# Patient Record
Sex: MALE | Race: WHITE | NOT HISPANIC OR LATINO | Employment: FULL TIME | URBAN - METROPOLITAN AREA
[De-identification: names, ages, dates, MRNs, and addresses within clinical notes are randomized per-mention and may not be internally consistent; named-entity substitution may affect disease eponyms.]

---

## 2017-02-08 DIAGNOSIS — R10.9 ABDOMINAL PAIN: ICD-10-CM

## 2017-02-08 DIAGNOSIS — E78.5 HYPERLIPIDEMIA: ICD-10-CM

## 2017-04-10 DIAGNOSIS — E78.5 HYPERLIPIDEMIA: ICD-10-CM

## 2017-04-10 DIAGNOSIS — R63.5 ABNORMAL WEIGHT GAIN: ICD-10-CM

## 2017-04-10 DIAGNOSIS — R00.0 TACHYCARDIA: ICD-10-CM

## 2017-04-10 DIAGNOSIS — F41.9 ANXIETY DISORDER: ICD-10-CM

## 2017-04-12 ENCOUNTER — TRANSCRIBE ORDERS (OUTPATIENT)
Dept: ADMINISTRATIVE | Facility: HOSPITAL | Age: 47
End: 2017-04-12

## 2017-04-12 ENCOUNTER — ALLSCRIPTS OFFICE VISIT (OUTPATIENT)
Dept: OTHER | Facility: OTHER | Age: 47
End: 2017-04-12

## 2017-04-12 DIAGNOSIS — F41.9 ANXIETY HYPERVENTILATION: Primary | ICD-10-CM

## 2017-04-12 DIAGNOSIS — F45.8 ANXIETY HYPERVENTILATION: Primary | ICD-10-CM

## 2017-04-12 DIAGNOSIS — E78.5 OTHER AND UNSPECIFIED HYPERLIPIDEMIA: ICD-10-CM

## 2017-04-12 DIAGNOSIS — F45.8 ANXIETY HYPERVENTILATION: ICD-10-CM

## 2017-04-12 DIAGNOSIS — F41.9 ANXIETY HYPERVENTILATION: ICD-10-CM

## 2017-04-12 DIAGNOSIS — R00.0 TACHYCARDIA, UNSPECIFIED: ICD-10-CM

## 2017-04-17 ENCOUNTER — GENERIC CONVERSION - ENCOUNTER (OUTPATIENT)
Dept: OTHER | Facility: OTHER | Age: 47
End: 2017-04-17

## 2017-04-17 ENCOUNTER — APPOINTMENT (OUTPATIENT)
Dept: LAB | Facility: MEDICAL CENTER | Age: 47
End: 2017-04-17
Payer: COMMERCIAL

## 2017-04-17 DIAGNOSIS — F45.8 ANXIETY HYPERVENTILATION: ICD-10-CM

## 2017-04-17 DIAGNOSIS — E78.5 HYPERLIPIDEMIA: ICD-10-CM

## 2017-04-17 DIAGNOSIS — F41.9 ANXIETY HYPERVENTILATION: ICD-10-CM

## 2017-04-17 DIAGNOSIS — E78.5 OTHER AND UNSPECIFIED HYPERLIPIDEMIA: ICD-10-CM

## 2017-04-17 DIAGNOSIS — R00.0 TACHYCARDIA, UNSPECIFIED: ICD-10-CM

## 2017-04-17 LAB
ALBUMIN SERPL BCP-MCNC: 3.7 G/DL (ref 3.5–5)
ALP SERPL-CCNC: 44 U/L (ref 46–116)
ALT SERPL W P-5'-P-CCNC: 25 U/L (ref 12–78)
ANION GAP SERPL CALCULATED.3IONS-SCNC: 6 MMOL/L (ref 4–13)
AST SERPL W P-5'-P-CCNC: 17 U/L (ref 5–45)
BASOPHILS # BLD AUTO: 0.02 THOUSANDS/ΜL (ref 0–0.1)
BASOPHILS NFR BLD AUTO: 0 % (ref 0–1)
BILIRUB SERPL-MCNC: 0.41 MG/DL (ref 0.2–1)
BUN SERPL-MCNC: 17 MG/DL (ref 5–25)
CALCIUM SERPL-MCNC: 8.4 MG/DL (ref 8.3–10.1)
CHLORIDE SERPL-SCNC: 107 MMOL/L (ref 100–108)
CHOLEST SERPL-MCNC: 233 MG/DL (ref 50–200)
CO2 SERPL-SCNC: 27 MMOL/L (ref 21–32)
CREAT SERPL-MCNC: 0.89 MG/DL (ref 0.6–1.3)
EOSINOPHIL # BLD AUTO: 0.24 THOUSAND/ΜL (ref 0–0.61)
EOSINOPHIL NFR BLD AUTO: 4 % (ref 0–6)
ERYTHROCYTE [DISTWIDTH] IN BLOOD BY AUTOMATED COUNT: 12.8 % (ref 11.6–15.1)
GFR SERPL CREATININE-BSD FRML MDRD: >60 ML/MIN/1.73SQ M
GLUCOSE P FAST SERPL-MCNC: 96 MG/DL (ref 65–99)
HCT VFR BLD AUTO: 43.3 % (ref 36.5–49.3)
HDLC SERPL-MCNC: 60 MG/DL (ref 40–60)
HGB BLD-MCNC: 14.6 G/DL (ref 12–17)
LDLC SERPL CALC-MCNC: 137 MG/DL (ref 0–100)
LYMPHOCYTES # BLD AUTO: 1.75 THOUSANDS/ΜL (ref 0.6–4.47)
LYMPHOCYTES NFR BLD AUTO: 31 % (ref 14–44)
MCH RBC QN AUTO: 30.7 PG (ref 26.8–34.3)
MCHC RBC AUTO-ENTMCNC: 33.7 G/DL (ref 31.4–37.4)
MCV RBC AUTO: 91 FL (ref 82–98)
MONOCYTES # BLD AUTO: 0.71 THOUSAND/ΜL (ref 0.17–1.22)
MONOCYTES NFR BLD AUTO: 12 % (ref 4–12)
NEUTROPHILS # BLD AUTO: 2.99 THOUSANDS/ΜL (ref 1.85–7.62)
NEUTS SEG NFR BLD AUTO: 53 % (ref 43–75)
NRBC BLD AUTO-RTO: 0 /100 WBCS
PLATELET # BLD AUTO: 242 THOUSANDS/UL (ref 149–390)
PMV BLD AUTO: 11.2 FL (ref 8.9–12.7)
POTASSIUM SERPL-SCNC: 4 MMOL/L (ref 3.5–5.3)
PROT SERPL-MCNC: 7.1 G/DL (ref 6.4–8.2)
RBC # BLD AUTO: 4.75 MILLION/UL (ref 3.88–5.62)
SODIUM SERPL-SCNC: 140 MMOL/L (ref 136–145)
TRIGL SERPL-MCNC: 181 MG/DL
TSH SERPL DL<=0.05 MIU/L-ACNC: 1.29 UIU/ML (ref 0.36–3.74)
WBC # BLD AUTO: 5.73 THOUSAND/UL (ref 4.31–10.16)

## 2017-04-17 PROCEDURE — 80061 LIPID PANEL: CPT

## 2017-04-17 PROCEDURE — 84443 ASSAY THYROID STIM HORMONE: CPT

## 2017-04-17 PROCEDURE — 36415 COLL VENOUS BLD VENIPUNCTURE: CPT

## 2017-04-17 PROCEDURE — 80053 COMPREHEN METABOLIC PANEL: CPT

## 2017-04-17 PROCEDURE — 85025 COMPLETE CBC W/AUTO DIFF WBC: CPT

## 2017-04-18 ENCOUNTER — GENERIC CONVERSION - ENCOUNTER (OUTPATIENT)
Dept: OTHER | Facility: OTHER | Age: 47
End: 2017-04-18

## 2017-05-09 ENCOUNTER — HOSPITAL ENCOUNTER (OUTPATIENT)
Dept: NUCLEAR MEDICINE | Facility: HOSPITAL | Age: 47
Discharge: HOME | End: 2017-05-09
Payer: COMMERCIAL

## 2017-05-09 ENCOUNTER — HOSPITAL ENCOUNTER (OUTPATIENT)
Dept: NON INVASIVE DIAGNOSTICS | Facility: HOSPITAL | Age: 47
Discharge: HOME | End: 2017-05-09
Payer: COMMERCIAL

## 2017-05-09 DIAGNOSIS — E78.5 HYPERLIPIDEMIA: ICD-10-CM

## 2017-05-09 DIAGNOSIS — F41.9 ANXIETY DISORDER: ICD-10-CM

## 2017-05-09 DIAGNOSIS — R00.0 TACHYCARDIA: ICD-10-CM

## 2017-05-09 DIAGNOSIS — E78.5 OTHER AND UNSPECIFIED HYPERLIPIDEMIA: ICD-10-CM

## 2017-05-09 DIAGNOSIS — R00.0 TACHYCARDIA, UNSPECIFIED: ICD-10-CM

## 2017-05-09 DIAGNOSIS — F45.8 ANXIETY HYPERVENTILATION: ICD-10-CM

## 2017-05-09 DIAGNOSIS — F41.9 ANXIETY HYPERVENTILATION: ICD-10-CM

## 2017-05-09 PROCEDURE — 93225 XTRNL ECG REC<48 HRS REC: CPT

## 2017-05-09 PROCEDURE — 93017 CV STRESS TEST TRACING ONLY: CPT

## 2017-05-09 PROCEDURE — 93306 TTE W/DOPPLER COMPLETE: CPT

## 2017-05-09 PROCEDURE — A9502 TC99M TETROFOSMIN: HCPCS

## 2017-05-09 PROCEDURE — 78452 HT MUSCLE IMAGE SPECT MULT: CPT

## 2017-05-09 PROCEDURE — 93226 XTRNL ECG REC<48 HR SCAN A/R: CPT

## 2017-05-11 ENCOUNTER — GENERIC CONVERSION - ENCOUNTER (OUTPATIENT)
Dept: OTHER | Facility: OTHER | Age: 47
End: 2017-05-11

## 2017-06-06 ENCOUNTER — ALLSCRIPTS OFFICE VISIT (OUTPATIENT)
Dept: OTHER | Facility: OTHER | Age: 47
End: 2017-06-06

## 2017-06-06 DIAGNOSIS — I49.1 ATRIAL PREMATURE DEPOLARIZATION: ICD-10-CM

## 2017-10-15 DIAGNOSIS — E78.5 HYPERLIPIDEMIA: ICD-10-CM

## 2017-10-31 ENCOUNTER — APPOINTMENT (OUTPATIENT)
Dept: LAB | Facility: MEDICAL CENTER | Age: 47
End: 2017-10-31
Payer: COMMERCIAL

## 2017-10-31 ENCOUNTER — TRANSCRIBE ORDERS (OUTPATIENT)
Dept: ADMINISTRATIVE | Facility: HOSPITAL | Age: 47
End: 2017-10-31

## 2017-10-31 DIAGNOSIS — E78.5 HYPERLIPIDEMIA: ICD-10-CM

## 2017-10-31 LAB
ALT SERPL W P-5'-P-CCNC: 28 U/L (ref 12–78)
AST SERPL W P-5'-P-CCNC: 24 U/L (ref 5–45)
CHOLEST SERPL-MCNC: 213 MG/DL (ref 50–200)
HDLC SERPL-MCNC: 61 MG/DL (ref 40–60)
LDLC SERPL CALC-MCNC: 121 MG/DL (ref 0–100)
TRIGL SERPL-MCNC: 155 MG/DL

## 2017-10-31 PROCEDURE — 84450 TRANSFERASE (AST) (SGOT): CPT

## 2017-10-31 PROCEDURE — 36415 COLL VENOUS BLD VENIPUNCTURE: CPT

## 2017-10-31 PROCEDURE — 84460 ALANINE AMINO (ALT) (SGPT): CPT

## 2017-10-31 PROCEDURE — 80061 LIPID PANEL: CPT

## 2018-01-10 NOTE — RESULT NOTES
Verified Results  (1) CBC/PLT/DIFF [id number] 06:50AM Joseph Villalobos     Test Name Result Flag Reference   WBC COUNT 5 73 Thousand/uL  4 31-10 16   RBC COUNT 4 75 Million/uL  3 88-5 62   HEMOGLOBIN 14 6 g/dL  12 0-17 0   HEMATOCRIT 43 3 %  36 5-49 3   MCV 91 fL  82-98   MCH 30 7 pg  26 8-34 3   MCHC 33 7 g/dL  31 4-37 4   RDW 12 8 %  11 6-15 1   MPV 11 2 fL  8 9-12 7   PLATELET COUNT 676 Thousands/uL  149-390   nRBC AUTOMATED 0 /100 WBCs     NEUTROPHILS RELATIVE PERCENT 53 %  43-75   LYMPHOCYTES RELATIVE PERCENT 31 %  14-44   MONOCYTES RELATIVE PERCENT 12 %  4-12   EOSINOPHILS RELATIVE PERCENT 4 %  0-6   BASOPHILS RELATIVE PERCENT 0 %  0-1   NEUTROPHILS ABSOLUTE COUNT 2 99 Thousands/? ??L  1 85-7 62   LYMPHOCYTES ABSOLUTE COUNT 1 75 Thousands/? ??L  0 60-4 47   MONOCYTES ABSOLUTE COUNT 0 71 Thousand/? ??L  0 17-1 22   EOSINOPHILS ABSOLUTE COUNT 0 24 Thousand/? ??L  0 00-0 61   BASOPHILS ABSOLUTE COUNT 0 02 Thousands/? ??L  0 00-0 10   This bloodwork is non-fasting  Please drink two glasses of water morning of  bloodwork  This bloodwork is non-fasting  Please drink two glasses of water morning ofbloodwork  (1) COMPREHENSIVE METABOLIC PANEL [id number] 83:34MO Joseph Villalobos     Test Name Result Flag Reference   SODIUM 140 mmol/L  136-145   POTASSIUM 4 0 mmol/L  3 5-5 3   CHLORIDE 107 mmol/L  100-108   CARBON DIOXIDE 27 mmol/L  21-32   ANION GAP (CALC) 6 mmol/L  4-13   BLOOD UREA NITROGEN 17 mg/dL  5-25   CREATININE 0 89 mg/dL  0 60-1 30   Standardized to IDMS reference method   CALCIUM 8 4 mg/dL  8 3-10 1   BILI, TOTAL 0 41 mg/dL  0 20-1 00   ALK PHOSPHATAS 44 U/L L    ALT (SGPT) 25 U/L  12-78   AST(SGOT) 17 U/L  5-45   ALBUMIN 3 7 g/dL  3 5-5 0   TOTAL PROTEIN 7 1 g/dL  6 4-8 2   eGFR Non-African American      >60 0 ml/min/1 73sq m   - Patient Instructions:  This is a fasting blood test  Water, black tea or black coffee only after 9:00pm the night before test Drink 2 glasses of water the morning of test This bloodwork is non-fasting  Please drink two glasses of water morning   ofbloodwork  National Kidney Disease Education Program recommendations are as follows:  GFR calculation is accurate only with a steady state creatinine  Chronic Kidney disease less than 60 ml/min/1 73 sq  meters  Kidney failure less than 15 ml/min/1 73 sq  meters  GLUCOSE FASTING 96 mg/dL  65-99     (1) TSH [id number] 06:50AM Liu Scales     Test Name Result Flag Reference   TSH 1 290 uIU/mL  0 358-3 740   This bloodwork is non-fasting  Please drink two glasses of water morning of  bloodwork  - Patient Instructions: This is a fasting blood test  Water, black tea or black coffee only after 9:00pm the night before test Drink 2 glasses of water the morning of test This bloodwork is non-fasting  Please drink two glasses of water morning   ofbloodwork  Patients undergoing fluorescein dye angiography may retain small amounts of fluorescein in the body for 48-72 hours post procedure  Samples containing fluorescein can produce falsely depressed TSH values  If the patient had this procedure,a specimen should be resubmitted post fluorescein clearance  Plan  Hyperlipidemia    · Atorvastatin Calcium 40 MG Oral Tablet;  Take 1 tablet daily

## 2018-01-12 VITALS
SYSTOLIC BLOOD PRESSURE: 130 MMHG | WEIGHT: 255.13 LBS | BODY MASS INDEX: 35.72 KG/M2 | DIASTOLIC BLOOD PRESSURE: 98 MMHG | HEIGHT: 71 IN | HEART RATE: 68 BPM

## 2018-01-13 NOTE — RESULT NOTES
Verified Results  HOLTER MONITOR - 24 HOUR [id number] 07:57AM Lavern Led   TW Order Number: [id number]    - Patient Instructions: To schedule this appointment, please contact Central Scheduling at [phone]  Test Name Result Flag Reference   HOLTER MONITOR - 24 HOUR (Report)       PT NAME: Wilson Krueger   : 1970 AGE: 52 y o  GENDER: male   MRN: [id number]  PROCEDURE: Holter monitor - 24 hour         INDICATIONS: Palpitations       FINDINGS:     Holter monitoring revealed predominant sinus rhythm with an average heart rate of 76 BPM, a minimum heart rate of 54 BPM, and a maximum heart rate of 126 BPM      There were no ventricular ectopic beats, or any evidence of ventricular tachycardia  There were about 10 supraventricular ectopic beats, mostly occurring as single PAC's and a 3-beat atrial run (no symptoms reported) with no evidence of supraventricular tachycardia, atrial fibrillation, or atrial flutter  There was no evidence of significant bradyarrhythmia or advanced heart block  The longest R-R interval was 1 2 seconds  The patient's symptom diary was returned blank  NM MYOCARDIAL PERFUSION SPECT (RX STRESS AND/OR REST) [id number] 07:56AM Lavern Grove     Test Name Result Flag Reference   NM MYOCARDIAL PERFUSION SPECT (RX STRESS AND/OR REST) (Report)     [address]   [address]  [place], [address]   [phone]     Rest/Stress Gated SPECT Myocardial Perfusion Imaging After Exercise     Patient: Zari Jerome   MR number: [id number]   Account number: [de-identified]   : 1970   Age: 52 years   Gender: Male   Status: Outpatient   Location: Stress lab   Height: 70 in   Weight: 250 lb   BP: 128/ 93 mmHg     Allergies: PENICILLINS     Diagnosis: R00 2 - Palpitations, R42  - Dizziness and giddiness     Primary Physician: Guillermo Arango [facility]   Technician: Chris Guerin   RN: Julio Dyson RN BSN   Referring Physician:  Deanne Biju Pugh MD   Group: [facility]   Report Prepared By[de-identified] Missael Kraft RN BSN   Interpreting Physician: Carmina Street DO     INDICATIONS: Detection of coronary artery disease  HISTORY: The patient is a 52year old  male  Chest pain status: no chest pain  Other symptoms: stress related pre-syncope and palpitations  Coronary artery disease risk factors: dyslipidemia  Cardiovascular history: none   significant  Medications: a lipid lowering agent  PHYSICAL EXAM: Baseline physical exam screening: normal lung exam      REST ECG: Normal sinus rhythm  Normal baseline ECG  PROCEDURE: The study was performed in the stress lab  Treadmill exercise testing was performed, using the Jareth protocol  Gated SPECT myocardial perfusion imaging was performed after stress and at rest  Systolic blood pressure was 128   mmHg, at the start of the study  Diastolic blood pressure was 93 mmHg, at the start of the study  The heart rate was 75 bpm, at the start of the study  IV double checked  JARETH PROTOCOL:   HR bpm SBP mmHg DBP mmHg Symptoms   Baseline 75 128 93 none   Stage 1 100 158 85 none   Stage 2 117 166 88 mild dyspnea   Stage 3 144 172 86 moderate dyspnea   Stage 4 151 -- -- fatigue   Recovery 1 141 149 84 subsiding   Recovery 2 108 136 85 none   Recovery 3 98 139 88 none   No medications or fluids given  STRESS SUMMARY: Duration of exercise was 10 min  The patient exercised to protocol stage 4  Maximal work rate was 14 6 METs  Maximal heart rate during stress was 151 bpm ( 87 % of maximal predicted heart rate)  The heart rate response to   stress was normal  There was normal resting blood pressure with a hypertensive response to stress  The rate-pressure product for the peak heart rate and blood pressure was 19390  There was no chest pain during stress  The stress test was   terminated due to achievement of maximal (symptom limited) exercise and achievement of target heart rate   Pre oxygen saturation: 98 %  Peak oxygen saturation: 98 %  The stress ECG was negative for ischemia  There were no stress arrhythmias   or conduction abnormalities  ISOTOPE ADMINISTRATION:   Resting isotope administration Stress isotope administration   Agent Tetrofosmin Tetrofosmin   Dose -- 32 1 mCi   Date 05/09/2017 05/09/2017   Injection time 08:44 10:02   Imaging time 09:14 10:32   Injection-image interval 30 min 30 min     Radiopharmaceutical was administered 9 min, 15 sec into the stress protocol  There was 45 sec of exercise after the injection  MYOCARDIAL PERFUSION IMAGING:   The image quality was good  Left ventricular size was normal  The TID ratio was 1 01  Left ventricular hypertrophy was noted  PERFUSION DEFECTS:   - There were no perfusion defects  GATED SPECT:   The calculated left ventricular ejection fraction was 51 %  Left ventricular ejection fraction was within normal limits by visual estimate  There was no left ventricular regional abnormality  SUMMARY:   - Stress results: Duration of exercise was 10 min  Target heart rate was achieved  There was normal resting blood pressure with a hypertensive response to stress  There was no chest pain during stress  - ECG conclusions: The stress ECG was negative for ischemia  - Perfusion imaging: There were no perfusion defects    - Gated SPECT: The calculated left ventricular ejection fraction was 51 %  Left ventricular ejection fraction was within normal limits by visual estimate  There was no left ventricular regional abnormality  IMPRESSIONS: Normal study after maximal exercise without reproduction of symptoms  Myocardial perfusion imaging was normal at rest and with stress   Left ventricular systolic function was normal      Prepared and signed by     Tani Mares DO   Signed 05/09/2017 14:25:22     ECHO COMPLETE WITH CONTRAST IF INDICATED [id number] 07:55AM Shelli STRICKLAND Order Number: [id number]    - Patient Instructions: To schedule this appointment, please contact Central Scheduling at [phone]  Test Name Result Flag Reference   ECHO COMPLETE WITH CONTRAST IF INDICATED (Report)     [facility]   [address]  [place], [address]   [phone]     Transthoracic Echocardiogram   2D, M-mode, Doppler, and Color Doppler     Study date: 09-May-2017     Patient: Fortunato Zayas   MR number: [id number]   Account number: [de-identified]   : 1970   Age: 52 years   Gender: Male   Status: Outpatient   Location: Echo lab   Height: 70 in   Weight: 250 lb   BP: 132/ 90 mmHg     Indications: SOB, Palpitations  Diagnoses: R00 2 - Palpitations, R06 02 - Shortness of breath     Sonographer: Troy GARCIA, DAREN   Primary Physician: Higinio Colbert [facility]   Referring Physician: Luci Mcfarland MD   Group: [facility]   Interpreting Physician: DO DAREN Whiting     LEFT VENTRICLE:   Systolic function was vigorous  Ejection fraction was estimated to be 70 %  There were no regional wall motion abnormalities  HISTORY: PRIOR HISTORY: Risk factors: hypercholesterolemia  PROCEDURE: The procedure was performed in the echo lab  This was a routine study  The transthoracic approach was used  The study included complete 2D imaging, M-mode, complete spectral Doppler, and color Doppler  Images were obtained from   the parasternal, apical, subcostal, and suprasternal notch acoustic windows  Image quality was adequate  LEFT VENTRICLE: Size was normal  Systolic function was vigorous  Ejection fraction was estimated to be 70 %  There were no regional wall motion abnormalities   Wall thickness was normal  DOPPLER: Left ventricular diastolic function   parameters were normal      RIGHT VENTRICLE: The size was normal  Systolic function was normal  Wall thickness was normal      LEFT ATRIUM: Size was normal      RIGHT ATRIUM: Size was normal      MITRAL VALVE: Valve structure was normal  There was normal leaflet separation  DOPPLER: The transmitral velocity was within the normal range  There was no evidence for stenosis  There was no regurgitation  AORTIC VALVE: The valve was trileaflet  Leaflets exhibited normal thickness and normal cuspal separation  DOPPLER: Transaortic velocity was within the normal range  There was no evidence for stenosis  There was no regurgitation  TRICUSPID VALVE: The valve structure was normal  There was normal leaflet separation  DOPPLER: The transtricuspid velocity was within the normal range  There was no evidence for stenosis  There was no regurgitation  PULMONIC VALVE: Not well visualized  DOPPLER: The transpulmonic velocity was within the normal range  There was no evidence for stenosis  There was no regurgitation  PERICARDIUM: There was no pericardial effusion  The pericardium was normal in appearance  AORTA: The root exhibited normal size  SYSTEMIC VEINS: IVC: The inferior vena cava was normal in size and course  Respirophasic changes were normal      PULMONARY ARTERY: DOPPLER: The tricuspid jet envelope definition was inadequate for estimation of RV systolic pressure       SYSTEM MEASUREMENT TABLES     2D   %FS: 39 8 %   Ao Diam: 3 7 cm   EDV(Teich): 104 9 ml   EF(Teich): 70 4 %   ESV(Teich): 31 1 ml   IVSd: 1 2 cm   LA Area: 18 1 cm2   LA Diam: 3 9 cm   LVEDV MOD A4C: 154 4 ml   LVEF MOD A4C: 64 3 %   LVESV MOD A4C: 55 1 ml   LVIDd: 4 7 cm   LVIDs: 2 9 cm   LVLd A4C: 8 9 cm   LVLs A4C: 7 7 cm   LVOT Diam: 2 4 cm   LVPWd: 1 1 cm   RA Area: 10 2 cm2   RVIDd: 4 1 cm   SV MOD A4C: 99 3 ml   SV(Teich): 73 8 ml     MM   TAPSE: 2 1 cm     PW   E': 0 1 m/s   E/E': 7 8   MV A Del: 0 7 m/s   MV Dec Harrison: 3 7 m/s2   MV DecT: 239 8 ms   MV E Del: 0 9 m/s   MV E/A Ratio: 1 2   MV PHT: 69 5 ms   MVA By PHT: 3 2 cm2     [employer] Accredited Echocardiography Laboratory     Prepared and electronically signed by     Karly Zheng DO   Signed 09-May-2017 13:42:53

## 2018-01-14 VITALS
RESPIRATION RATE: 16 BRPM | DIASTOLIC BLOOD PRESSURE: 90 MMHG | BODY MASS INDEX: 35 KG/M2 | WEIGHT: 250 LBS | HEIGHT: 71 IN | HEART RATE: 76 BPM | SYSTOLIC BLOOD PRESSURE: 132 MMHG

## 2018-01-15 NOTE — RESULT NOTES
Verified Results  (1) LIPID PANEL FASTING W DIRECT LDL REFLEX [id number] 06:50AM Ovidio Crowe    Order Number: [id number]     Test Name Result Flag Reference   CHOLESTEROL 233 mg/dL H    LDL CHOLESTEROL CALCULATED 137 mg/dL H 0-100   - Patient Instructions: This is a fasting blood test  Water, black tea or black coffee only after 9:00pm the night before test   Drink 2 glasses of water the morning of test     - Patient Instructions: This is a fasting blood test  Water, black tea or black coffee only after 9:00pm the night before test Drink 2 glasses of water the morning of test This bloodwork is non-fasting  Please drink two glasses of water morning   ofbloodwork  Triglyceride:         Normal              <150 mg/dl       Borderline High    150-199 mg/dl       High               200-499 mg/dl       Very High          >499 mg/dl  Cholesterol:         Desirable        <200 mg/dl      Borderline High  200-239 mg/dl      High             >239 mg/dl  HDL Cholesterol:        High    >59 mg/dL      Low     <41 mg/dL  LDL Cholesterol:        Optimal          <100 mg/dl        Near Optimal     100-129 mg/dl        Above Optimal          Borderline High   130-159 mg/dl          High              160-189 mg/dl          Very High        >189 mg/dl  LDL CALCULATED:    This screening LDL is a calculated result  It does not have the accuracy of the Direct Measured LDL in the monitoring of patients with hyperlipidemia and/or statin therapy  Direct Measure LDL (TRO807) must be ordered separately in these patients  TRIGLYCERIDES 181 mg/dL H <=150   Specimen collection should occur prior to N-Acetylcysteine or Metamizole administration due to the potential for falsely depressed results  HDL,DIRECT 60 mg/dL  40-60   Specimen collection should occur prior to Metamizole administration due to the potential for falsely depressed results

## 2018-02-06 DIAGNOSIS — F41.9 ANXIETY: Primary | ICD-10-CM

## 2018-02-06 RX ORDER — ESCITALOPRAM OXALATE 20 MG/1
TABLET ORAL
Qty: 90 TABLET | Refills: 3 | Status: SHIPPED | OUTPATIENT
Start: 2018-02-06 | End: 2019-02-03 | Stop reason: SDUPTHER

## 2018-03-21 DIAGNOSIS — E78.2 MIXED HYPERLIPIDEMIA: Primary | ICD-10-CM

## 2018-03-21 RX ORDER — ATORVASTATIN CALCIUM 40 MG/1
TABLET, FILM COATED ORAL
Qty: 90 TABLET | Refills: 3 | Status: SHIPPED | OUTPATIENT
Start: 2018-03-21 | End: 2019-03-16 | Stop reason: SDUPTHER

## 2018-03-22 DIAGNOSIS — E78.2 MIXED HYPERLIPIDEMIA: Primary | ICD-10-CM

## 2018-03-22 PROBLEM — R09.89 HYPERDYNAMIC CIRCULATION: Status: ACTIVE | Noted: 2017-05-11

## 2018-03-22 PROBLEM — I49.1 PAC (PREMATURE ATRIAL CONTRACTION): Status: ACTIVE | Noted: 2017-05-11

## 2018-04-09 ENCOUNTER — APPOINTMENT (OUTPATIENT)
Dept: LAB | Facility: MEDICAL CENTER | Age: 48
End: 2018-04-09
Payer: COMMERCIAL

## 2018-04-09 DIAGNOSIS — E78.2 MIXED HYPERLIPIDEMIA: ICD-10-CM

## 2018-04-09 LAB
ALBUMIN SERPL BCP-MCNC: 3.6 G/DL (ref 3.5–5)
ALP SERPL-CCNC: 60 U/L (ref 46–116)
ALT SERPL W P-5'-P-CCNC: 24 U/L (ref 12–78)
ANION GAP SERPL CALCULATED.3IONS-SCNC: 7 MMOL/L (ref 4–13)
AST SERPL W P-5'-P-CCNC: 17 U/L (ref 5–45)
BILIRUB SERPL-MCNC: 0.46 MG/DL (ref 0.2–1)
BUN SERPL-MCNC: 12 MG/DL (ref 5–25)
CALCIUM SERPL-MCNC: 8.6 MG/DL
CHLORIDE SERPL-SCNC: 108 MMOL/L (ref 100–108)
CHOLEST SERPL-MCNC: 199 MG/DL (ref 50–200)
CO2 SERPL-SCNC: 26 MMOL/L (ref 21–32)
CREAT SERPL-MCNC: 0.88 MG/DL (ref 0.6–1.3)
GFR SERPL CREATININE-BSD FRML MDRD: 102 ML/MIN/1.73SQ M
GLUCOSE P FAST SERPL-MCNC: 100 MG/DL (ref 65–99)
HDLC SERPL-MCNC: 45 MG/DL (ref 40–60)
LDLC SERPL CALC-MCNC: 83 MG/DL (ref 0–100)
POTASSIUM SERPL-SCNC: 3.8 MMOL/L (ref 3.5–5.3)
PROT SERPL-MCNC: 7.2 G/DL (ref 6.4–8.2)
SODIUM SERPL-SCNC: 141 MMOL/L (ref 136–145)
TRIGL SERPL-MCNC: 353 MG/DL

## 2018-04-09 PROCEDURE — 36415 COLL VENOUS BLD VENIPUNCTURE: CPT

## 2018-04-09 PROCEDURE — 80053 COMPREHEN METABOLIC PANEL: CPT

## 2018-04-09 PROCEDURE — 80061 LIPID PANEL: CPT

## 2018-04-25 ENCOUNTER — OFFICE VISIT (OUTPATIENT)
Dept: FAMILY MEDICINE CLINIC | Facility: CLINIC | Age: 48
End: 2018-04-25
Payer: COMMERCIAL

## 2018-04-25 VITALS
HEART RATE: 80 BPM | BODY MASS INDEX: 39.37 KG/M2 | WEIGHT: 275 LBS | HEIGHT: 70 IN | SYSTOLIC BLOOD PRESSURE: 128 MMHG | DIASTOLIC BLOOD PRESSURE: 98 MMHG

## 2018-04-25 DIAGNOSIS — I49.1 PAC (PREMATURE ATRIAL CONTRACTION): ICD-10-CM

## 2018-04-25 DIAGNOSIS — E78.2 MIXED HYPERLIPIDEMIA: Primary | ICD-10-CM

## 2018-04-25 DIAGNOSIS — F41.9 ANXIETY: ICD-10-CM

## 2018-04-25 PROCEDURE — 3008F BODY MASS INDEX DOCD: CPT | Performed by: FAMILY MEDICINE

## 2018-04-25 PROCEDURE — 99214 OFFICE O/P EST MOD 30 MIN: CPT | Performed by: FAMILY MEDICINE

## 2018-04-25 NOTE — PROGRESS NOTES
Assessment and Plan:    Problem List Items Addressed This Visit     Mixed hyperlipidemia - Primary     Cholesterol is slightly elevated, i e  specifically the triglycerides are elevated  HDL is also low  On the positive side, the total cholesterol and the LDL are lower  At this point, re-evaluate in 3-6 months, 6 months ordered  Patient preferred not to add medication yet  I would consider trilipix in the future  Relevant Orders    Comprehensive metabolic panel    Lipid panel    Anxiety     Anxiety is minimal   On Lexapro  Continue same  Re-evaluate in the future  Negative SI          PAC (premature atrial contraction)     Minimal to no symptoms  Follow-up in the future as needed  Diagnoses and all orders for this visit:    Mixed hyperlipidemia  -     Comprehensive metabolic panel; Future  -     Lipid panel; Future    PAC (premature atrial contraction)    Anxiety    Other orders  -     Sodium Fluoride (PREVIDENT 5000 BOOSTER) 1 1 % PSTE; Apply to teeth              Subjective:      Patient ID: Bibiana Kunz is a 50 y o  male  CC: Follow up to review bw   mjs    Reviewed laboratory studies  Please see copies on the chart  With regard to cholesterol, he is currently on atorvastatin  He did mention that he has some dietary indiscretions  With regard to the PACs, patient does have a history of them, but cardiology saw him and did not feel that there was a significant change that needed to occur  Was likely more anxiety  Patient has rarely had symptoms since then  The following portions of the patient's history were reviewed and updated as appropriate: allergies, current medications, past family history, past medical history, past social history, past surgical history and problem list       Review of Systems   Constitutional: Negative  HENT: Negative  Eyes: Negative  Respiratory: Negative  Cardiovascular: Negative  Gastrointestinal: Negative  Endocrine: Negative  Genitourinary: Negative  Musculoskeletal: Negative  Skin: Negative  Allergic/Immunologic: Negative  Neurological: Negative  Hematological: Negative  Psychiatric/Behavioral: Negative  Data to review:   Laboratory studies reviewed  Please see copies on the chart  Blood sugar was 100  AST 17, ALT 24  Creatinine 0 88    Total cholesterol 199, LDL 83, HDL 45, triglycerides 353  Objective:    Vitals:    04/25/18 1721   BP: 128/98   Pulse: 80   Weight: 125 kg (275 lb)   Height: 5' 10" (1 778 m)        Physical Exam   Constitutional: He appears well-developed and well-nourished  HENT:   Head: Normocephalic and atraumatic  Neck: Normal range of motion  Neck supple  Cardiovascular: Normal rate, regular rhythm and normal heart sounds  Exam reveals no gallop and no friction rub  No murmur heard  Pulses:       Carotid pulses are 2+ on the right side, and 2+ on the left side  Pulmonary/Chest: Effort normal and breath sounds normal  No respiratory distress  He has no wheezes  He has no rales  Nursing note and vitals reviewed

## 2018-04-25 NOTE — PATIENT INSTRUCTIONS
Problem List Items Addressed This Visit     Mixed hyperlipidemia - Primary     Cholesterol is slightly elevated, i e  specifically the triglycerides are elevated  HDL is also low  On the positive side, the total cholesterol and the LDL are lower  At this point, re-evaluate in 3-6 months, 6 months ordered  Patient preferred not to add medication yet  I would consider trilipix in the future  Relevant Orders    Comprehensive metabolic panel    Lipid panel    Anxiety     Anxiety is minimal   On Lexapro  Continue same  Re-evaluate in the future  Negative SI          PAC (premature atrial contraction)     Minimal to no symptoms  Follow-up in the future as needed

## 2018-04-25 NOTE — ASSESSMENT & PLAN NOTE
Cholesterol is slightly elevated, i e  specifically the triglycerides are elevated  HDL is also low  On the positive side, the total cholesterol and the LDL are lower  At this point, re-evaluate in 3-6 months, 6 months ordered  Patient preferred not to add medication yet  I would consider trilipix in the future

## 2018-11-07 ENCOUNTER — APPOINTMENT (OUTPATIENT)
Dept: LAB | Facility: MEDICAL CENTER | Age: 48
End: 2018-11-07
Payer: COMMERCIAL

## 2018-11-07 DIAGNOSIS — E78.2 MIXED HYPERLIPIDEMIA: ICD-10-CM

## 2018-11-07 LAB
ALBUMIN SERPL BCP-MCNC: 3.7 G/DL (ref 3.5–5)
ALP SERPL-CCNC: 52 U/L (ref 46–116)
ALT SERPL W P-5'-P-CCNC: 33 U/L (ref 12–78)
ANION GAP SERPL CALCULATED.3IONS-SCNC: 6 MMOL/L (ref 4–13)
AST SERPL W P-5'-P-CCNC: 36 U/L (ref 5–45)
BILIRUB SERPL-MCNC: 0.6 MG/DL (ref 0.2–1)
BUN SERPL-MCNC: 13 MG/DL (ref 5–25)
CALCIUM SERPL-MCNC: 9 MG/DL (ref 8.3–10.1)
CHLORIDE SERPL-SCNC: 103 MMOL/L (ref 100–108)
CHOLEST SERPL-MCNC: 183 MG/DL (ref 50–200)
CO2 SERPL-SCNC: 26 MMOL/L (ref 21–32)
CREAT SERPL-MCNC: 0.95 MG/DL (ref 0.6–1.3)
GFR SERPL CREATININE-BSD FRML MDRD: 94 ML/MIN/1.73SQ M
GLUCOSE P FAST SERPL-MCNC: 94 MG/DL (ref 65–99)
HDLC SERPL-MCNC: 49 MG/DL (ref 40–60)
LDLC SERPL CALC-MCNC: 94 MG/DL (ref 0–100)
NONHDLC SERPL-MCNC: 134 MG/DL
POTASSIUM SERPL-SCNC: 4 MMOL/L (ref 3.5–5.3)
PROT SERPL-MCNC: 7.2 G/DL (ref 6.4–8.2)
SODIUM SERPL-SCNC: 135 MMOL/L (ref 136–145)
TRIGL SERPL-MCNC: 200 MG/DL

## 2018-11-07 PROCEDURE — 80061 LIPID PANEL: CPT

## 2018-11-07 PROCEDURE — 80053 COMPREHEN METABOLIC PANEL: CPT

## 2018-11-07 PROCEDURE — 36415 COLL VENOUS BLD VENIPUNCTURE: CPT

## 2018-11-21 ENCOUNTER — OFFICE VISIT (OUTPATIENT)
Dept: FAMILY MEDICINE CLINIC | Facility: CLINIC | Age: 48
End: 2018-11-21
Payer: COMMERCIAL

## 2018-11-21 VITALS
BODY MASS INDEX: 40.66 KG/M2 | HEART RATE: 76 BPM | DIASTOLIC BLOOD PRESSURE: 92 MMHG | WEIGHT: 284 LBS | HEIGHT: 70 IN | SYSTOLIC BLOOD PRESSURE: 120 MMHG

## 2018-11-21 DIAGNOSIS — F41.9 ANXIETY: ICD-10-CM

## 2018-11-21 DIAGNOSIS — I49.1 PAC (PREMATURE ATRIAL CONTRACTION): ICD-10-CM

## 2018-11-21 DIAGNOSIS — E78.2 MIXED HYPERLIPIDEMIA: Primary | ICD-10-CM

## 2018-11-21 PROCEDURE — 99214 OFFICE O/P EST MOD 30 MIN: CPT | Performed by: FAMILY MEDICINE

## 2018-11-21 PROCEDURE — 3008F BODY MASS INDEX DOCD: CPT | Performed by: FAMILY MEDICINE

## 2018-11-21 PROCEDURE — 1036F TOBACCO NON-USER: CPT | Performed by: FAMILY MEDICINE

## 2018-11-21 NOTE — ASSESSMENT & PLAN NOTE
Stable with Lexapro  Recheck in the future  Of note, the sodium was slightly low so I would also recommend repeat a basic metabolic profile as nonfasting at some point in the future  This should probably be done before the end of the year

## 2018-11-21 NOTE — ASSESSMENT & PLAN NOTE
Cholesterol is improved  Continue on atorvastatin 40 mg  Recheck in 6 months  ensure supplement  RD consult

## 2018-11-21 NOTE — PROGRESS NOTES
Assessment/Plan:    PAC (premature atrial contraction)  Stable  No changes currently  Follow-up in the future  Mixed hyperlipidemia  Cholesterol is improved  Continue on atorvastatin 40 mg  Recheck in 6 months  Anxiety  Stable with Lexapro  Recheck in the future  Of note, the sodium was slightly low so I would also recommend repeat a basic metabolic profile as nonfasting at some point in the future  This should probably be done before the end of the year  Diagnoses and all orders for this visit:    Mixed hyperlipidemia  -     Comprehensive metabolic panel; Future  -     Lipid panel; Future    PAC (premature atrial contraction)  -     Comprehensive metabolic panel; Future    Anxiety  -     Comprehensive metabolic panel; Future  -     Basic metabolic panel; Future          Subjective: Follow up to chronic conditions and review bw results  [facility]     [place] ID: Maribell Sumner is a 50 y o  male  Patient does have hyperlipidemia  Currently on Lipitor 40 mg  Doing quite well with that  Denies any issues as far as PACs are concerned  For anxiety, he is currently using Lexapro 20 mg  Again, doing quite well with this  The following portions of the patient's history were reviewed and updated as appropriate: allergies, current medications, past family history, past medical history, past social history, past surgical history and problem list     Review of Systems   Constitutional: Negative  HENT: Negative  Eyes: Negative  Respiratory: Negative  Cardiovascular: Negative  Gastrointestinal: Negative  Endocrine: Negative  Genitourinary: Negative  Musculoskeletal: Negative  Skin: Negative  Allergic/Immunologic: Negative  Neurological: Negative  Hematological: Negative  Psychiatric/Behavioral: Negative  Laboratory studies reviewed  Please see copies on chart  Sodium level 135  Potassium normal   Creatinine 0 95, GFR 94  Blood sugar 94    AST 36, ALT 33  Total cholesterol 183, LDL 94, HDL 49, triglycerides 200  Objective:      /92   Pulse 76   Ht 5' 10" (1 778 m)   Wt 129 kg (284 lb)   BMI 40 75 kg/m²          Physical Exam   Constitutional: He appears well-developed and well-nourished  HENT:   Head: Normocephalic and atraumatic  Neck: Normal range of motion  Neck supple  Cardiovascular: Normal rate, regular rhythm and normal heart sounds  Exam reveals no gallop and no friction rub  No murmur heard  Pulses:       Carotid pulses are 2+ on the right side, and 2+ on the left side  Pulmonary/Chest: Effort normal and breath sounds normal  No respiratory distress  He has no wheezes  He has no rales  Nursing note and vitals reviewed

## 2018-11-21 NOTE — PATIENT INSTRUCTIONS
Problem List Items Addressed This Visit        Cardiovascular and Mediastinum    PAC (premature atrial contraction)     Stable  No changes currently  Follow-up in the future  Relevant Orders    Comprehensive metabolic panel       Other    Mixed hyperlipidemia - Primary     Cholesterol is improved  Continue on atorvastatin 40 mg  Recheck in 6 months  Relevant Orders    Comprehensive metabolic panel    Lipid panel    Anxiety     Stable with Lexapro  Recheck in the future  Of note, the sodium was slightly low so I would also recommend repeat a basic metabolic profile as nonfasting at some point in the future  This should probably be done before the end of the year           Relevant Orders    Comprehensive metabolic panel    Basic metabolic panel

## 2019-02-03 DIAGNOSIS — F41.9 ANXIETY: ICD-10-CM

## 2019-02-04 RX ORDER — ESCITALOPRAM OXALATE 20 MG/1
TABLET ORAL
Qty: 90 TABLET | Refills: 3 | Status: SHIPPED | OUTPATIENT
Start: 2019-02-04 | End: 2020-01-30

## 2019-03-16 DIAGNOSIS — E78.2 MIXED HYPERLIPIDEMIA: ICD-10-CM

## 2019-03-18 RX ORDER — ATORVASTATIN CALCIUM 40 MG/1
TABLET, FILM COATED ORAL
Qty: 90 TABLET | Refills: 3 | Status: SHIPPED | OUTPATIENT
Start: 2019-03-18 | End: 2020-03-12

## 2019-05-22 ENCOUNTER — APPOINTMENT (OUTPATIENT)
Dept: LAB | Facility: MEDICAL CENTER | Age: 49
End: 2019-05-22
Payer: COMMERCIAL

## 2019-05-22 DIAGNOSIS — E78.2 MIXED HYPERLIPIDEMIA: ICD-10-CM

## 2019-05-22 DIAGNOSIS — I49.1 PAC (PREMATURE ATRIAL CONTRACTION): ICD-10-CM

## 2019-05-22 DIAGNOSIS — F41.9 ANXIETY: ICD-10-CM

## 2019-05-22 LAB
ALBUMIN SERPL BCP-MCNC: 3.5 G/DL (ref 3.5–5)
ALP SERPL-CCNC: 58 U/L (ref 46–116)
ALT SERPL W P-5'-P-CCNC: 21 U/L (ref 12–78)
ANION GAP SERPL CALCULATED.3IONS-SCNC: 6 MMOL/L (ref 4–13)
AST SERPL W P-5'-P-CCNC: 19 U/L (ref 5–45)
BILIRUB SERPL-MCNC: 0.54 MG/DL (ref 0.2–1)
BUN SERPL-MCNC: 15 MG/DL (ref 5–25)
CALCIUM SERPL-MCNC: 8.1 MG/DL (ref 8.3–10.1)
CHLORIDE SERPL-SCNC: 107 MMOL/L (ref 100–108)
CHOLEST SERPL-MCNC: 197 MG/DL (ref 50–200)
CO2 SERPL-SCNC: 26 MMOL/L (ref 21–32)
CREAT SERPL-MCNC: 0.92 MG/DL (ref 0.6–1.3)
GFR SERPL CREATININE-BSD FRML MDRD: 97 ML/MIN/1.73SQ M
GLUCOSE P FAST SERPL-MCNC: 104 MG/DL (ref 65–99)
HDLC SERPL-MCNC: 41 MG/DL (ref 40–60)
LDLC SERPL CALC-MCNC: 122 MG/DL (ref 0–100)
NONHDLC SERPL-MCNC: 156 MG/DL
POTASSIUM SERPL-SCNC: 4.5 MMOL/L (ref 3.5–5.3)
PROT SERPL-MCNC: 7.1 G/DL (ref 6.4–8.2)
SODIUM SERPL-SCNC: 139 MMOL/L (ref 136–145)
TRIGL SERPL-MCNC: 169 MG/DL

## 2019-05-22 PROCEDURE — 80053 COMPREHEN METABOLIC PANEL: CPT

## 2019-05-22 PROCEDURE — 80061 LIPID PANEL: CPT

## 2019-05-22 PROCEDURE — 36415 COLL VENOUS BLD VENIPUNCTURE: CPT

## 2019-05-29 ENCOUNTER — OFFICE VISIT (OUTPATIENT)
Dept: FAMILY MEDICINE CLINIC | Facility: CLINIC | Age: 49
End: 2019-05-29
Payer: COMMERCIAL

## 2019-05-29 VITALS
DIASTOLIC BLOOD PRESSURE: 88 MMHG | SYSTOLIC BLOOD PRESSURE: 124 MMHG | HEIGHT: 70 IN | BODY MASS INDEX: 40.2 KG/M2 | WEIGHT: 280.8 LBS

## 2019-05-29 DIAGNOSIS — E78.2 MIXED HYPERLIPIDEMIA: Primary | ICD-10-CM

## 2019-05-29 DIAGNOSIS — F41.9 ANXIETY: ICD-10-CM

## 2019-05-29 DIAGNOSIS — R73.9 HYPERGLYCEMIA: ICD-10-CM

## 2019-05-29 DIAGNOSIS — J40 BRONCHITIS: ICD-10-CM

## 2019-05-29 DIAGNOSIS — E83.51 HYPOCALCEMIA: ICD-10-CM

## 2019-05-29 PROCEDURE — 3008F BODY MASS INDEX DOCD: CPT | Performed by: FAMILY MEDICINE

## 2019-05-29 PROCEDURE — 99214 OFFICE O/P EST MOD 30 MIN: CPT | Performed by: FAMILY MEDICINE

## 2019-05-29 PROCEDURE — 1036F TOBACCO NON-USER: CPT | Performed by: FAMILY MEDICINE

## 2019-05-29 RX ORDER — BENZONATATE 200 MG/1
200 CAPSULE ORAL 3 TIMES DAILY PRN
Qty: 20 CAPSULE | Refills: 0 | Status: SHIPPED | OUTPATIENT
Start: 2019-05-29 | End: 2020-01-16

## 2019-05-29 RX ORDER — AZITHROMYCIN 250 MG/1
TABLET, FILM COATED ORAL
Qty: 6 TABLET | Refills: 0 | Status: SHIPPED | OUTPATIENT
Start: 2019-05-29 | End: 2019-06-03

## 2019-12-11 ENCOUNTER — APPOINTMENT (OUTPATIENT)
Dept: LAB | Facility: MEDICAL CENTER | Age: 49
End: 2019-12-11
Payer: COMMERCIAL

## 2019-12-11 DIAGNOSIS — E78.2 MIXED HYPERLIPIDEMIA: ICD-10-CM

## 2019-12-11 DIAGNOSIS — E83.51 HYPOCALCEMIA: ICD-10-CM

## 2019-12-11 LAB
ALBUMIN SERPL BCP-MCNC: 4.1 G/DL (ref 3.5–5)
ALP SERPL-CCNC: 50 U/L (ref 46–116)
ALT SERPL W P-5'-P-CCNC: 38 U/L (ref 12–78)
ANION GAP SERPL CALCULATED.3IONS-SCNC: 7 MMOL/L (ref 4–13)
AST SERPL W P-5'-P-CCNC: 36 U/L (ref 5–45)
BILIRUB SERPL-MCNC: 0.93 MG/DL (ref 0.2–1)
BUN SERPL-MCNC: 16 MG/DL (ref 5–25)
CA-I BLD-SCNC: 1.15 MMOL/L (ref 1.12–1.32)
CALCIUM SERPL-MCNC: 9 MG/DL (ref 8.3–10.1)
CHLORIDE SERPL-SCNC: 103 MMOL/L (ref 100–108)
CHOLEST SERPL-MCNC: 198 MG/DL (ref 50–200)
CO2 SERPL-SCNC: 25 MMOL/L (ref 21–32)
CREAT SERPL-MCNC: 0.96 MG/DL (ref 0.6–1.3)
GFR SERPL CREATININE-BSD FRML MDRD: 92 ML/MIN/1.73SQ M
GLUCOSE P FAST SERPL-MCNC: 90 MG/DL (ref 65–99)
HDLC SERPL-MCNC: 45 MG/DL
LDLC SERPL CALC-MCNC: 117 MG/DL (ref 0–100)
NONHDLC SERPL-MCNC: 153 MG/DL
POTASSIUM SERPL-SCNC: 3.9 MMOL/L (ref 3.5–5.3)
PROT SERPL-MCNC: 7.3 G/DL (ref 6.4–8.2)
SODIUM SERPL-SCNC: 135 MMOL/L (ref 136–145)
TRIGL SERPL-MCNC: 181 MG/DL

## 2019-12-11 PROCEDURE — 80061 LIPID PANEL: CPT

## 2019-12-11 PROCEDURE — 80053 COMPREHEN METABOLIC PANEL: CPT

## 2019-12-11 PROCEDURE — 82330 ASSAY OF CALCIUM: CPT

## 2019-12-11 PROCEDURE — 36415 COLL VENOUS BLD VENIPUNCTURE: CPT

## 2020-01-16 ENCOUNTER — OFFICE VISIT (OUTPATIENT)
Dept: FAMILY MEDICINE CLINIC | Facility: CLINIC | Age: 50
End: 2020-01-16
Payer: COMMERCIAL

## 2020-01-16 VITALS
HEIGHT: 70 IN | BODY MASS INDEX: 36.08 KG/M2 | WEIGHT: 252 LBS | DIASTOLIC BLOOD PRESSURE: 88 MMHG | HEART RATE: 80 BPM | SYSTOLIC BLOOD PRESSURE: 108 MMHG

## 2020-01-16 DIAGNOSIS — F41.9 ANXIETY: ICD-10-CM

## 2020-01-16 DIAGNOSIS — E83.51 HYPOCALCEMIA: ICD-10-CM

## 2020-01-16 DIAGNOSIS — E78.2 MIXED HYPERLIPIDEMIA: Primary | ICD-10-CM

## 2020-01-16 DIAGNOSIS — R73.9 HYPERGLYCEMIA: ICD-10-CM

## 2020-01-16 PROCEDURE — 99214 OFFICE O/P EST MOD 30 MIN: CPT | Performed by: FAMILY MEDICINE

## 2020-01-16 PROCEDURE — 3008F BODY MASS INDEX DOCD: CPT | Performed by: FAMILY MEDICINE

## 2020-01-16 PROCEDURE — 1036F TOBACCO NON-USER: CPT | Performed by: FAMILY MEDICINE

## 2020-01-16 NOTE — ASSESSMENT & PLAN NOTE
Cholesterol is fairly stable  The HDL actually improved  With this, we did discuss the possibility of increasing Lipitor verses changing to Crestor  At this time, we agreed that we would recheck in 6 months rather than making any adjustments, and tried a increase exercise some, as well as limiting fried foods, fatty foods, beef, pork

## 2020-01-16 NOTE — PROGRESS NOTES
Assessment and Plan:    Problem List Items Addressed This Visit     Anxiety     Stable  Continues on Lexapro  Check labs in 6 months  This would include CBC, TSH  Relevant Orders    TSH, 3rd generation    CBC and differential    Hyperglycemia     Blood sugar was normal   Check in 6 months  Limit carbohydrates  Relevant Orders    Comprehensive metabolic panel    Hypocalcemia     Calcium levels normal   Again will check in 6 months  Relevant Orders    Comprehensive metabolic panel    Mixed hyperlipidemia - Primary     Cholesterol is fairly stable  The HDL actually improved  With this, we did discuss the possibility of increasing Lipitor verses changing to Crestor  At this time, we agreed that we would recheck in 6 months rather than making any adjustments, and tried a increase exercise some, as well as limiting fried foods, fatty foods, beef, pork  Relevant Orders    Comprehensive metabolic panel    Lipid panel                 Diagnoses and all orders for this visit:    Mixed hyperlipidemia  -     Comprehensive metabolic panel; Future  -     Lipid panel; Future    Hypocalcemia  -     Comprehensive metabolic panel; Future    Hyperglycemia  -     Comprehensive metabolic panel; Future    Anxiety  -     TSH, 3rd generation; Future  -     CBC and differential; Future              Subjective:      Patient ID: Alyson Ramirez is a 52 y o  male  CC:    Chief Complaint   Patient presents with    Follow-up     Follow up to chronic conditions and review lab results  s       HPI:    Patient is here to follow-up on hyperlipidemia, as well as hyperglycemia and hypocalcemia  Laboratory studies reviewed  For his cholesterol, he is currently on 40 mg Lipitor  Did review that he has had cholesterol issues for quite some time, including when he was younger  Risk factors for heart disease include hyperlipidemia, male  Blood sugar was normal today      Calcium level was normal   Ionized calcium was also normal   Denies any concerns with this  Patient is on Lexapro for anxiety  Seems to be helping him quite a bit  The following portions of the patient's history were reviewed and updated as appropriate: allergies, current medications, past family history, past medical history, past social history, past surgical history and problem list       Review of Systems   Constitutional: Negative  HENT: Negative  Eyes: Negative  Respiratory: Negative  Cardiovascular: Negative  Gastrointestinal: Negative  Endocrine: Negative  Genitourinary: Negative  Musculoskeletal: Negative  Skin: Negative  Allergic/Immunologic: Negative  Neurological: Negative  Hematological: Negative  Psychiatric/Behavioral: Negative  Data to review:   Laboratories from December reviewed  Sodium 135, potassium 3 9, calcium 9 0  Ionized calcium 1 15, normal   Creatinine 0 96, GFR 92  AST 36, ALT 38  Glucose 90  Total cholesterol 198, , HDL 45, triglycerides 181  Objective:    Vitals:    01/16/20 1611   BP: 108/88   Pulse: 80   Weight: 114 kg (252 lb)   Height: 5' 10" (1 778 m)        Physical Exam   Constitutional: He appears well-developed and well-nourished  HENT:   Head: Normocephalic and atraumatic  Neck: Normal range of motion  Neck supple  Cardiovascular: Normal rate, regular rhythm and normal heart sounds  Exam reveals no gallop and no friction rub  No murmur heard  Pulses:       Carotid pulses are 2+ on the right side, and 2+ on the left side  Pulmonary/Chest: Effort normal and breath sounds normal  No respiratory distress  He has no wheezes  He has no rales  Nursing note and vitals reviewed  BMI Counseling: Body mass index is 36 16 kg/m²   The BMI is above normal  Nutrition recommendations include decreasing portion sizes, encouraging healthy choices of fruits and vegetables, decreasing fast food intake, consuming healthier snacks, limiting drinks that contain sugar, moderation in carbohydrate intake, increasing intake of lean protein, reducing intake of saturated and trans fat and reducing intake of cholesterol  Exercise recommendations include exercising 3-5 times per week  No pharmacotherapy was ordered

## 2020-01-16 NOTE — PATIENT INSTRUCTIONS
Problem List Items Addressed This Visit     Anxiety     Stable  Continues on Lexapro  Check labs in 6 months  This would include CBC, TSH  Relevant Orders    TSH, 3rd generation    CBC and differential    Hyperglycemia     Blood sugar was normal   Check in 6 months  Limit carbohydrates  Relevant Orders    Comprehensive metabolic panel    Hypocalcemia     Calcium levels normal   Again will check in 6 months  Relevant Orders    Comprehensive metabolic panel    Mixed hyperlipidemia - Primary     Cholesterol is fairly stable  The HDL actually improved  With this, we did discuss the possibility of increasing Lipitor verses changing to Crestor  At this time, we agreed that we would recheck in 6 months rather than making any adjustments, and tried a increase exercise some, as well as limiting fried foods, fatty foods, beef, pork           Relevant Orders    Comprehensive metabolic panel    Lipid panel

## 2020-01-30 DIAGNOSIS — F41.9 ANXIETY: ICD-10-CM

## 2020-01-30 RX ORDER — ESCITALOPRAM OXALATE 20 MG/1
TABLET ORAL
Qty: 90 TABLET | Refills: 3 | Status: SHIPPED | OUTPATIENT
Start: 2020-01-30 | End: 2021-01-25

## 2020-03-12 DIAGNOSIS — E78.2 MIXED HYPERLIPIDEMIA: ICD-10-CM

## 2020-03-12 RX ORDER — ATORVASTATIN CALCIUM 40 MG/1
TABLET, FILM COATED ORAL
Qty: 90 TABLET | Refills: 3 | Status: SHIPPED | OUTPATIENT
Start: 2020-03-12 | End: 2021-03-09

## 2020-05-18 DIAGNOSIS — Z12.11 COLON CANCER SCREENING: Primary | ICD-10-CM

## 2020-05-18 DIAGNOSIS — K42.9 UMBILICAL HERNIA WITHOUT OBSTRUCTION AND WITHOUT GANGRENE: Primary | ICD-10-CM

## 2020-06-03 ENCOUNTER — CONSULT (OUTPATIENT)
Dept: SURGERY | Facility: CLINIC | Age: 50
End: 2020-06-03
Payer: COMMERCIAL

## 2020-06-03 VITALS
DIASTOLIC BLOOD PRESSURE: 80 MMHG | TEMPERATURE: 98.6 F | SYSTOLIC BLOOD PRESSURE: 104 MMHG | HEIGHT: 70 IN | BODY MASS INDEX: 25.91 KG/M2 | WEIGHT: 181 LBS | HEART RATE: 72 BPM | RESPIRATION RATE: 16 BRPM

## 2020-06-03 DIAGNOSIS — K42.9 UMBILICAL HERNIA WITHOUT OBSTRUCTION AND WITHOUT GANGRENE: Primary | ICD-10-CM

## 2020-06-03 PROCEDURE — 99243 OFF/OP CNSLTJ NEW/EST LOW 30: CPT | Performed by: SURGERY

## 2020-06-03 RX ORDER — CLINDAMYCIN PHOSPHATE 900 MG/50ML
900 INJECTION INTRAVENOUS ONCE
Status: CANCELLED | OUTPATIENT
Start: 2020-07-06 | End: 2020-06-03

## 2020-06-03 RX ORDER — SODIUM CHLORIDE, SODIUM LACTATE, POTASSIUM CHLORIDE, CALCIUM CHLORIDE 600; 310; 30; 20 MG/100ML; MG/100ML; MG/100ML; MG/100ML
125 INJECTION, SOLUTION INTRAVENOUS CONTINUOUS
Status: CANCELLED | OUTPATIENT
Start: 2020-07-06

## 2020-06-25 ENCOUNTER — LAB (OUTPATIENT)
Dept: LAB | Facility: MEDICAL CENTER | Age: 50
End: 2020-06-25
Payer: COMMERCIAL

## 2020-06-25 DIAGNOSIS — E78.2 MIXED HYPERLIPIDEMIA: ICD-10-CM

## 2020-06-25 DIAGNOSIS — R73.9 HYPERGLYCEMIA: Primary | ICD-10-CM

## 2020-06-25 DIAGNOSIS — R73.9 HYPERGLYCEMIA: ICD-10-CM

## 2020-06-25 DIAGNOSIS — E83.51 HYPOCALCEMIA: ICD-10-CM

## 2020-06-25 DIAGNOSIS — F41.9 ANXIETY: ICD-10-CM

## 2020-06-25 DIAGNOSIS — K42.9 UMBILICAL HERNIA WITHOUT OBSTRUCTION AND WITHOUT GANGRENE: ICD-10-CM

## 2020-06-25 LAB
ALBUMIN SERPL BCP-MCNC: 3.8 G/DL (ref 3.5–5)
ALP SERPL-CCNC: 36 U/L (ref 46–116)
ALT SERPL W P-5'-P-CCNC: 40 U/L (ref 12–78)
ANION GAP SERPL CALCULATED.3IONS-SCNC: 4 MMOL/L (ref 4–13)
AST SERPL W P-5'-P-CCNC: 40 U/L (ref 5–45)
BASOPHILS # BLD AUTO: 0.05 THOUSANDS/ΜL (ref 0–0.1)
BASOPHILS NFR BLD AUTO: 1 % (ref 0–1)
BILIRUB SERPL-MCNC: 0.6 MG/DL (ref 0.2–1)
BUN SERPL-MCNC: 27 MG/DL (ref 5–25)
CALCIUM SERPL-MCNC: 8.9 MG/DL (ref 8.3–10.1)
CHLORIDE SERPL-SCNC: 107 MMOL/L (ref 100–108)
CHOLEST SERPL-MCNC: 236 MG/DL (ref 50–200)
CO2 SERPL-SCNC: 30 MMOL/L (ref 21–32)
CREAT SERPL-MCNC: 1.02 MG/DL (ref 0.6–1.3)
EOSINOPHIL # BLD AUTO: 0.12 THOUSAND/ΜL (ref 0–0.61)
EOSINOPHIL NFR BLD AUTO: 2 % (ref 0–6)
ERYTHROCYTE [DISTWIDTH] IN BLOOD BY AUTOMATED COUNT: 14.3 % (ref 11.6–15.1)
EST. AVERAGE GLUCOSE BLD GHB EST-MCNC: 97 MG/DL
GFR SERPL CREATININE-BSD FRML MDRD: 85 ML/MIN/1.73SQ M
GLUCOSE P FAST SERPL-MCNC: 118 MG/DL (ref 65–99)
HBA1C MFR BLD: 5 %
HCT VFR BLD AUTO: 40.8 % (ref 36.5–49.3)
HDLC SERPL-MCNC: 96 MG/DL
HGB BLD-MCNC: 13.2 G/DL (ref 12–17)
IMM GRANULOCYTES # BLD AUTO: 0.01 THOUSAND/UL (ref 0–0.2)
IMM GRANULOCYTES NFR BLD AUTO: 0 % (ref 0–2)
LDLC SERPL CALC-MCNC: 127 MG/DL (ref 0–100)
LYMPHOCYTES # BLD AUTO: 1.31 THOUSANDS/ΜL (ref 0.6–4.47)
LYMPHOCYTES NFR BLD AUTO: 19 % (ref 14–44)
MCH RBC QN AUTO: 32 PG (ref 26.8–34.3)
MCHC RBC AUTO-ENTMCNC: 32.4 G/DL (ref 31.4–37.4)
MCV RBC AUTO: 99 FL (ref 82–98)
MONOCYTES # BLD AUTO: 0.73 THOUSAND/ΜL (ref 0.17–1.22)
MONOCYTES NFR BLD AUTO: 10 % (ref 4–12)
NEUTROPHILS # BLD AUTO: 4.78 THOUSANDS/ΜL (ref 1.85–7.62)
NEUTS SEG NFR BLD AUTO: 68 % (ref 43–75)
NONHDLC SERPL-MCNC: 140 MG/DL
NRBC BLD AUTO-RTO: 0 /100 WBCS
PLATELET # BLD AUTO: 220 THOUSANDS/UL (ref 149–390)
PMV BLD AUTO: 11.2 FL (ref 8.9–12.7)
POTASSIUM SERPL-SCNC: 4.6 MMOL/L (ref 3.5–5.3)
PROT SERPL-MCNC: 6.8 G/DL (ref 6.4–8.2)
RBC # BLD AUTO: 4.12 MILLION/UL (ref 3.88–5.62)
SODIUM SERPL-SCNC: 141 MMOL/L (ref 136–145)
TRIGL SERPL-MCNC: 67 MG/DL
TSH SERPL DL<=0.05 MIU/L-ACNC: 0.83 UIU/ML (ref 0.36–3.74)
WBC # BLD AUTO: 7 THOUSAND/UL (ref 4.31–10.16)

## 2020-06-25 PROCEDURE — 83036 HEMOGLOBIN GLYCOSYLATED A1C: CPT

## 2020-06-25 PROCEDURE — 80053 COMPREHEN METABOLIC PANEL: CPT

## 2020-06-25 PROCEDURE — 85025 COMPLETE CBC W/AUTO DIFF WBC: CPT

## 2020-06-25 PROCEDURE — 36415 COLL VENOUS BLD VENIPUNCTURE: CPT

## 2020-06-25 PROCEDURE — 80061 LIPID PANEL: CPT

## 2020-06-25 PROCEDURE — 84443 ASSAY THYROID STIM HORMONE: CPT

## 2020-06-25 NOTE — RESULT ENCOUNTER NOTE
Tests were normal  Please follow up at next office visit as scheduled  A1c was good  Follow-up at next office visit

## 2020-06-30 DIAGNOSIS — K42.9 UMBILICAL HERNIA WITHOUT OBSTRUCTION AND WITHOUT GANGRENE: ICD-10-CM

## 2020-06-30 PROCEDURE — U0003 INFECTIOUS AGENT DETECTION BY NUCLEIC ACID (DNA OR RNA); SEVERE ACUTE RESPIRATORY SYNDROME CORONAVIRUS 2 (SARS-COV-2) (CORONAVIRUS DISEASE [COVID-19]), AMPLIFIED PROBE TECHNIQUE, MAKING USE OF HIGH THROUGHPUT TECHNOLOGIES AS DESCRIBED BY CMS-2020-01-R: HCPCS

## 2020-06-30 RX ORDER — MULTIVITAMIN
1 TABLET ORAL DAILY
COMMUNITY

## 2020-06-30 NOTE — PRE-PROCEDURE INSTRUCTIONS
Pre-Surgery Instructions:   Medication Instructions    atorvastatin (LIPITOR) 40 mg tablet Instructed patient per Anesthesia Guidelines   escitalopram (LEXAPRO) 20 mg tablet Instructed patient per Anesthesia Guidelines   Multiple Vitamin (MULTIVITAMIN) tablet Instructed patient per Anesthesia Guidelines  Patient per anesth guidelines instructed *to take*escitalopram*with a sip of water the morning of surgery  Patient given/ instructed on use of chlorhexidine soap per hospital protocol    Patient instructed to stop all ASA, NSAIDS, vitamins and herbal supplements one week prior to surgery or per Dr Ruben Lamar

## 2020-07-02 ENCOUNTER — ANESTHESIA EVENT (OUTPATIENT)
Dept: PERIOP | Facility: HOSPITAL | Age: 50
End: 2020-07-02
Payer: COMMERCIAL

## 2020-07-05 LAB — SARS-COV-2 RNA SPEC QL NAA+PROBE: NOT DETECTED

## 2020-07-06 ENCOUNTER — HOSPITAL ENCOUNTER (OUTPATIENT)
Facility: HOSPITAL | Age: 50
Setting detail: OUTPATIENT SURGERY
Discharge: HOME | End: 2020-07-06
Attending: SURGERY | Admitting: SURGERY
Payer: COMMERCIAL

## 2020-07-06 ENCOUNTER — ANESTHESIA (OUTPATIENT)
Dept: PERIOP | Facility: HOSPITAL | Age: 50
End: 2020-07-06
Payer: COMMERCIAL

## 2020-07-06 VITALS
DIASTOLIC BLOOD PRESSURE: 76 MMHG | RESPIRATION RATE: 15 BRPM | HEART RATE: 57 BPM | HEIGHT: 70 IN | BODY MASS INDEX: 25.77 KG/M2 | OXYGEN SATURATION: 97 % | SYSTOLIC BLOOD PRESSURE: 120 MMHG | TEMPERATURE: 98.6 F | WEIGHT: 180 LBS

## 2020-07-06 DIAGNOSIS — K42.9 UMBILICAL HERNIA WITHOUT OBSTRUCTION AND WITHOUT GANGRENE: ICD-10-CM

## 2020-07-06 PROCEDURE — 88302 TISSUE EXAM BY PATHOLOGIST: CPT | Performed by: PATHOLOGY

## 2020-07-06 PROCEDURE — 49585 PR REPAIR UMBILICAL HERN,5+Y/O,REDUC: CPT | Performed by: SURGERY

## 2020-07-06 PROCEDURE — 99024 POSTOP FOLLOW-UP VISIT: CPT | Performed by: SURGERY

## 2020-07-06 RX ORDER — BUPIVACAINE HYDROCHLORIDE 5 MG/ML
INJECTION, SOLUTION EPIDURAL; INTRACAUDAL AS NEEDED
Status: DISCONTINUED | OUTPATIENT
Start: 2020-07-06 | End: 2020-07-06 | Stop reason: HOSPADM

## 2020-07-06 RX ORDER — SODIUM CHLORIDE, SODIUM LACTATE, POTASSIUM CHLORIDE, CALCIUM CHLORIDE 600; 310; 30; 20 MG/100ML; MG/100ML; MG/100ML; MG/100ML
125 INJECTION, SOLUTION INTRAVENOUS CONTINUOUS
Status: DISCONTINUED | OUTPATIENT
Start: 2020-07-06 | End: 2020-07-06 | Stop reason: HOSPADM

## 2020-07-06 RX ORDER — CLINDAMYCIN PHOSPHATE 900 MG/50ML
900 INJECTION INTRAVENOUS ONCE
Status: COMPLETED | OUTPATIENT
Start: 2020-07-06 | End: 2020-07-06

## 2020-07-06 RX ORDER — HYDROMORPHONE HCL/PF 1 MG/ML
0.5 SYRINGE (ML) INJECTION
Status: DISCONTINUED | OUTPATIENT
Start: 2020-07-06 | End: 2020-07-06 | Stop reason: HOSPADM

## 2020-07-06 RX ORDER — MAGNESIUM HYDROXIDE 1200 MG/15ML
LIQUID ORAL AS NEEDED
Status: DISCONTINUED | OUTPATIENT
Start: 2020-07-06 | End: 2020-07-06 | Stop reason: HOSPADM

## 2020-07-06 RX ORDER — DEXAMETHASONE SODIUM PHOSPHATE 10 MG/ML
INJECTION, SOLUTION INTRAMUSCULAR; INTRAVENOUS AS NEEDED
Status: DISCONTINUED | OUTPATIENT
Start: 2020-07-06 | End: 2020-07-06 | Stop reason: SURG

## 2020-07-06 RX ORDER — SODIUM CHLORIDE 9 MG/ML
125 INJECTION, SOLUTION INTRAVENOUS CONTINUOUS
Status: DISCONTINUED | OUTPATIENT
Start: 2020-07-06 | End: 2020-07-06 | Stop reason: HOSPADM

## 2020-07-06 RX ORDER — HYDROCODONE BITARTRATE AND ACETAMINOPHEN 5; 325 MG/1; MG/1
1 TABLET ORAL EVERY 4 HOURS PRN
Status: DISCONTINUED | OUTPATIENT
Start: 2020-07-06 | End: 2020-07-06 | Stop reason: HOSPADM

## 2020-07-06 RX ORDER — GLYCOPYRROLATE 0.2 MG/ML
INJECTION INTRAMUSCULAR; INTRAVENOUS AS NEEDED
Status: DISCONTINUED | OUTPATIENT
Start: 2020-07-06 | End: 2020-07-06 | Stop reason: SURG

## 2020-07-06 RX ORDER — NEOSTIGMINE METHYLSULFATE 1 MG/ML
INJECTION INTRAVENOUS AS NEEDED
Status: DISCONTINUED | OUTPATIENT
Start: 2020-07-06 | End: 2020-07-06 | Stop reason: SURG

## 2020-07-06 RX ORDER — FENTANYL CITRATE 50 UG/ML
INJECTION, SOLUTION INTRAMUSCULAR; INTRAVENOUS AS NEEDED
Status: DISCONTINUED | OUTPATIENT
Start: 2020-07-06 | End: 2020-07-06 | Stop reason: SURG

## 2020-07-06 RX ORDER — SCOLOPAMINE TRANSDERMAL SYSTEM 1 MG/1
1 PATCH, EXTENDED RELEASE TRANSDERMAL ONCE AS NEEDED
Status: DISCONTINUED | OUTPATIENT
Start: 2020-07-06 | End: 2020-07-06 | Stop reason: HOSPADM

## 2020-07-06 RX ORDER — KETOROLAC TROMETHAMINE 30 MG/ML
INJECTION, SOLUTION INTRAMUSCULAR; INTRAVENOUS AS NEEDED
Status: DISCONTINUED | OUTPATIENT
Start: 2020-07-06 | End: 2020-07-06 | Stop reason: SURG

## 2020-07-06 RX ORDER — ONDANSETRON 2 MG/ML
INJECTION INTRAMUSCULAR; INTRAVENOUS AS NEEDED
Status: DISCONTINUED | OUTPATIENT
Start: 2020-07-06 | End: 2020-07-06 | Stop reason: SURG

## 2020-07-06 RX ORDER — HYDROCODONE BITARTRATE AND ACETAMINOPHEN 5; 325 MG/1; MG/1
2 TABLET ORAL EVERY 6 HOURS PRN
Status: DISCONTINUED | OUTPATIENT
Start: 2020-07-06 | End: 2020-07-06 | Stop reason: HOSPADM

## 2020-07-06 RX ORDER — ONDANSETRON 2 MG/ML
4 INJECTION INTRAMUSCULAR; INTRAVENOUS EVERY 6 HOURS PRN
Status: DISCONTINUED | OUTPATIENT
Start: 2020-07-06 | End: 2020-07-06 | Stop reason: HOSPADM

## 2020-07-06 RX ORDER — FENTANYL CITRATE 50 UG/ML
50 INJECTION, SOLUTION INTRAMUSCULAR; INTRAVENOUS
Status: DISCONTINUED | OUTPATIENT
Start: 2020-07-06 | End: 2020-07-06 | Stop reason: HOSPADM

## 2020-07-06 RX ORDER — HYDROCODONE BITARTRATE AND ACETAMINOPHEN 5; 325 MG/1; MG/1
1 TABLET ORAL EVERY 6 HOURS PRN
Qty: 20 TABLET | Refills: 0 | Status: SHIPPED | OUTPATIENT
Start: 2020-07-06 | End: 2020-07-16

## 2020-07-06 RX ORDER — ROCURONIUM BROMIDE 10 MG/ML
INJECTION, SOLUTION INTRAVENOUS AS NEEDED
Status: DISCONTINUED | OUTPATIENT
Start: 2020-07-06 | End: 2020-07-06 | Stop reason: SURG

## 2020-07-06 RX ORDER — MIDAZOLAM HYDROCHLORIDE 2 MG/2ML
INJECTION, SOLUTION INTRAMUSCULAR; INTRAVENOUS AS NEEDED
Status: DISCONTINUED | OUTPATIENT
Start: 2020-07-06 | End: 2020-07-06 | Stop reason: SURG

## 2020-07-06 RX ORDER — PROPOFOL 10 MG/ML
INJECTION, EMULSION INTRAVENOUS AS NEEDED
Status: DISCONTINUED | OUTPATIENT
Start: 2020-07-06 | End: 2020-07-06 | Stop reason: SURG

## 2020-07-06 RX ADMIN — SODIUM CHLORIDE 125 ML/HR: 0.9 INJECTION, SOLUTION INTRAVENOUS at 09:54

## 2020-07-06 RX ADMIN — CLINDAMYCIN PHOSPHATE 900 MG: 900 INJECTION, SOLUTION INTRAVENOUS at 09:55

## 2020-07-06 RX ADMIN — SODIUM CHLORIDE 125 ML/HR: 0.9 INJECTION, SOLUTION INTRAVENOUS at 06:50

## 2020-07-06 RX ADMIN — PROPOFOL 200 MG: 10 INJECTION, EMULSION INTRAVENOUS at 11:24

## 2020-07-06 RX ADMIN — NEOSTIGMINE METHYLSULFATE 3 MG: 1 INJECTION, SOLUTION INTRAVENOUS at 12:06

## 2020-07-06 RX ADMIN — ROCURONIUM BROMIDE 30 MG: 10 INJECTION, SOLUTION INTRAVENOUS at 11:24

## 2020-07-06 RX ADMIN — DEXAMETHASONE SODIUM PHOSPHATE 8 MG: 10 INJECTION, SOLUTION INTRAMUSCULAR; INTRAVENOUS at 11:41

## 2020-07-06 RX ADMIN — MIDAZOLAM 2 MG: 1 INJECTION INTRAMUSCULAR; INTRAVENOUS at 11:16

## 2020-07-06 RX ADMIN — SODIUM CHLORIDE 125 ML/HR: 0.9 INJECTION, SOLUTION INTRAVENOUS at 12:45

## 2020-07-06 RX ADMIN — GLYCOPYRROLATE 0.4 MG: 0.2 INJECTION, SOLUTION INTRAMUSCULAR; INTRAVENOUS at 12:06

## 2020-07-06 RX ADMIN — ONDANSETRON 4 MG: 2 INJECTION INTRAMUSCULAR; INTRAVENOUS at 11:59

## 2020-07-06 RX ADMIN — SCOPALAMINE 1 PATCH: 1 PATCH, EXTENDED RELEASE TRANSDERMAL at 07:00

## 2020-07-06 RX ADMIN — KETOROLAC TROMETHAMINE 30 MG: 30 INJECTION, SOLUTION INTRAMUSCULAR at 11:59

## 2020-07-06 RX ADMIN — FENTANYL CITRATE 100 MCG: 50 INJECTION, SOLUTION INTRAMUSCULAR; INTRAVENOUS at 11:24

## 2020-07-06 NOTE — OP NOTE
OPERATIVE REPORT  PATIENT NAME: Maribell Sumner    :  1970  MRN: [id number]  Pt Location: [facility] OR ROOM 03    SURGERY DATE: 2020    Surgeon(s) and Role:     * Jose Gongora MD - Primary     * Mariia Aguillon MD - Assisting    Preop Diagnosis:  Umbilical hernia without obstruction and without gangrene [K42 9]    Post-Op Diagnosis Codes:     * Umbilical hernia without obstruction and without gangrene [K42 9]    Procedure(s) (LRB):  REPAIR HERNIA UMBILICAL (N/A)    Specimen(s):  ID Type Source Tests Collected by Time Destination   1 : Umbilical hernia sac Tissue Hernia Sac, Umbilical TISSUE EXAM Jose Gongora MD 2020 [id number]        Estimated Blood Loss:   Minimal    Drains:  * No LDAs found *    Anesthesia Type:   General    Operative Indications:  Umbilical hernia without obstruction and without gangrene [K42 9]      Operative Findings:  Fascial defect less than 1 5 cm    Complications:   None    Procedure and Technique:  The patient was seen in the Holding Room  The risks, benefits, complications, treatment options, and expected outcomes were discussed with the patient  The possibilities of reaction to medication, pulmonary aspiration, perforation of viscus, bleeding, recurrent infection, the need for additional procedures, failure to diagnose a condition, and creating a complication requiring transfusion or operation were discussed with the patient  The patient concurred with the proposed plan, giving informed consent  The site of surgery properly noted/marked  The patient was taken to Operating Room, identified as Maribell Sumner and staff verified patient name, , and procedure  A Time Out was held and the above information confirmed  The patient was placed supine  After establishing general anesthesia, the abdomen was prepped and draped in standard fashion  Local anesthesia was used at the incision  An infraumbilical incision was made    Dissection was carried down to the hernia sac located above the fascia and was mobilized from surrounding structures  The hernia sac was opened, its contents reduced, and the sac was suture-ligated at its base using a 2-0 Vicryl suture if necessary  The defect was quite small and deemed inappropriate for mesh placement  Intact fascia was identified circumferentially around the defect  Adhesions anterior and posterior to the fascia were lysed using cautery and/or blunt dissection  This Fascia was closed with interrupted 2-0 Prolene sutures in a vest over trousers technique  The umbilicus was re-created using 3-0 Vicryl sutures  The soft tissue was irrigated and closed in layers,using Vicryl sutures  Hemostasis was confirmed  The skin incision was closed in layers with a 4-0 Monocryl subcuticular closure  Histocryl glue was used  Instrument, sponge, and needle counts were correct prior to closure and at the conclusion of the case  This text is generated with voice recognition software  There may be translation, syntax,  or grammatical errors  If you have any questions, please contact the dictating provider  The PA was medically necessary for assistance with opening closing retraction visualization and hemostasis       I was present for the entire procedure    Patient Disposition:  PACU     SIGNATURE: Richard Jaimes MD  DATE: July 6, 2020  TIME: 12:01 PM

## 2020-07-06 NOTE — DISCHARGE INSTRUCTIONS
[facility]  Post-Operative Care Instructions  Dr Alma Rosa Beaver MD, Nhung Villaseñor  [phone]    1  General: You will feel pulling sensations around the wound or funny aches and pains around the incisions  This is normal  Even minor surgery is a change in your body and this is your bodys way of reaction to it  If you have had abdominal surgery, it may help to support the incision with a small pillow or blanket for comfort when moving or coughing  2  Wound care:  Okay to shower  The glue will fall off over the next week or 2     3  Water: You may shower over the wound, unless there are drain tubes left in place  Do not bathe or use a pool or hot tub until cleared by the physician  4  Activity: You may go up and down stairs, walk as much as you are comfortable, but walk at least 3 times each day  If you have had abdominal surgery, do not lift anything heavier than 20 pounds for at least 4 weeks, unless cleared by the doctor  5  Diet: You may resume a regular diet  If you had a same-day surgery or overnight stay surgery, he may wish to eat lightly for a few days: soups, crackers, and sandwiches  You may resume a regular diet when ready  6  Medications: Resume all of your previous medications, unless told otherwise by the doctor  A good option for pain control is to start with Tylenol and ibuprofen and alternate taking them every 2 hours for 1-2 days  If this is not sufficient then substituted the Tylenol for the narcotic pain medicine prescribed  Insure that you do not take more than 4000 mg of Tylenol per day  You do not need to take the narcotic pain medications unless you are having significant pain and discomfort  7  Driving: You will need someone to drive you home on the day of surgery  Do not drive or make any important decisions while on narcotic pain medication or 24 hours and after anesthesia or sedation for surgery   Generally, you may drive when your off all narcotic pain medications  8  Upset Stomach: You may take Maalox, Tums, or similar items for an upset stomach  If your narcotic pain medication causes an upset stomach, do not take it on an empty stomach  Try taking it with at least some crackers or toast      9  Constipation: Patients often experienced constipation after surgery  You may take over-the-counter medication for this, such as Metamucil, Senokot, Dulcolax, milk of magnesia, etc  You may take a suppository unless you have had anorectal surgery such as a procedure on your hemorrhoids  If you experience significant nausea or vomiting after abdominal surgery, call the office before trying any of these medications  10  Call the office: If you are experiencing any of the following, fevers above 101 5°, significant nausea or vomiting, if the wound develops drainage and/or is excessive redness around the wound, or if you have significant diarrhea or other worsening symptoms  11  Pain: You may be given a prescription for pain  This will be given to the hospital, the day of surgery  12  Sexual Activity: You may resume sexual activity when you feel ready and comfortable and your incision is sealed and healed without apparent infection risk

## 2020-07-06 NOTE — ANESTHESIA PREPROCEDURE EVALUATION
Review of Systems/Medical History  Patient summary reviewed  Chart reviewed  No history of anesthetic complications     Cardiovascular  Hyperlipidemia, Hypertension controlled, Dysrhythmias (Hx of PACs) ,    Pulmonary  Smoker ex-smoker  ,        GI/Hepatic    Liver disease , Hepatitis (remote) A,        Negative  ROS        Endo/Other  Negative endo/other ROS      GYN       Hematology  Negative hematology ROS      Musculoskeletal  Negative musculoskeletal ROS        Neurology  Negative neurology ROS      Psychology   Anxiety,              Physical Exam    Airway    Mallampati score: II  TM Distance: >3 FB  Neck ROM: full     Dental   Comment: Missing teeth,     Cardiovascular  Rhythm: regular, Rate: normal, Carotid bruit, Cardiovascular exam normal    Pulmonary  Pulmonary exam normal Breath sounds clear to auscultation,     Other Findings        Anesthesia Plan  ASA Score- 2     Anesthesia Type- general with ASA Monitors  Additional Monitors:   Airway Plan:     Comment: SCOP patch ordered  Plan Factors-Patient not instructed to abstain from smoking on day of procedure       Induction- intravenous  Postoperative Plan- Plan for postoperative opioid use  Informed Consent- Anesthetic plan and risks discussed with patient

## 2020-07-06 NOTE — ANESTHESIA POSTPROCEDURE EVALUATION
Post-Op Assessment Note    CV Status:  Stable  Pain Score: 2    Pain management: adequate     Mental Status:  Alert and awake   Hydration Status:  Euvolemic   PONV Controlled:  Controlled   Airway Patency:  Patent   Post Op Vitals Reviewed: Yes      Staff: Anesthesiologist           /66 (07/06/20 1235)    Temp      Pulse (!) 54 (07/06/20 1235)   Resp 14 (07/06/20 1235)    SpO2 95 % (07/06/20 1235)
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
## 2020-07-21 ENCOUNTER — OFFICE VISIT (OUTPATIENT)
Dept: SURGERY | Facility: CLINIC | Age: 50
End: 2020-07-21

## 2020-07-21 VITALS — WEIGHT: 180.5 LBS | TEMPERATURE: 97.9 F | BODY MASS INDEX: 25.84 KG/M2 | HEIGHT: 70 IN

## 2020-07-21 DIAGNOSIS — Z09 STATUS POST UMBILICAL HERNIA REPAIR, FOLLOW-UP EXAM: Primary | ICD-10-CM

## 2020-07-21 PROCEDURE — 99024 POSTOP FOLLOW-UP VISIT: CPT | Performed by: SURGERY

## 2020-07-21 PROCEDURE — 3008F BODY MASS INDEX DOCD: CPT | Performed by: SURGERY

## 2020-07-21 NOTE — PROGRESS NOTES
Assessment/Plan:    No problem-specific Assessment & Plan notes found for this encounter  Diagnoses and all orders for this visit:    Status post umbilical hernia repair, follow-up exam          Overall doing very well since surgery  Has no complaints  Postoperative instructions were provided regarding restrictions for 2 more weeks  Follow up p r n  Subjective:      Patient ID: Emmett Gibson is a 48 y o  male  HPI        Review of Systems      Objective:      Temp 97 9 °F (36 6 °C)   Ht 5' 10" (1 778 m)   Wt 81 9 kg (180 lb 8 oz)   BMI 25 90 kg/m²          Physical Exam   Abdominal: Soft  Bowel sounds are normal  He exhibits no distension  Incision clean dry and intact

## 2020-07-27 ENCOUNTER — OFFICE VISIT (OUTPATIENT)
Dept: FAMILY MEDICINE CLINIC | Facility: CLINIC | Age: 50
End: 2020-07-27
Payer: COMMERCIAL

## 2020-07-27 VITALS
HEART RATE: 68 BPM | WEIGHT: 204.13 LBS | BODY MASS INDEX: 29.22 KG/M2 | SYSTOLIC BLOOD PRESSURE: 94 MMHG | HEIGHT: 70 IN | DIASTOLIC BLOOD PRESSURE: 70 MMHG | TEMPERATURE: 98 F

## 2020-07-27 DIAGNOSIS — Z12.5 PROSTATE CANCER SCREENING: ICD-10-CM

## 2020-07-27 DIAGNOSIS — Z12.11 SCREENING FOR COLON CANCER: ICD-10-CM

## 2020-07-27 DIAGNOSIS — E78.2 MIXED HYPERLIPIDEMIA: Primary | ICD-10-CM

## 2020-07-27 DIAGNOSIS — I49.1 PAC (PREMATURE ATRIAL CONTRACTION): ICD-10-CM

## 2020-07-27 DIAGNOSIS — F41.9 ANXIETY: ICD-10-CM

## 2020-07-27 DIAGNOSIS — R73.9 HYPERGLYCEMIA: ICD-10-CM

## 2020-07-27 PROCEDURE — 3008F BODY MASS INDEX DOCD: CPT | Performed by: FAMILY MEDICINE

## 2020-07-27 PROCEDURE — 1036F TOBACCO NON-USER: CPT | Performed by: FAMILY MEDICINE

## 2020-07-27 PROCEDURE — 99214 OFFICE O/P EST MOD 30 MIN: CPT | Performed by: FAMILY MEDICINE

## 2020-07-27 NOTE — ASSESSMENT & PLAN NOTE
Blood sugar was slightly up  A1c was quite good, however  Based on this, try to limit carbohydrates

## 2020-07-27 NOTE — ASSESSMENT & PLAN NOTE
Patient is mainly vegetarian, and his HDL was fantastic  Continue with atorvastatin, as the LDL continues to be slightly elevated  I would not increase the medication, however

## 2020-07-27 NOTE — PATIENT INSTRUCTIONS
Problem List Items Addressed This Visit     Anxiety     Doing well at this point with Lexapro  Negative SI, positive contract  Follow in approximately 6-12 months  Hyperglycemia     Blood sugar was slightly up  A1c was quite good, however  Based on this, try to limit carbohydrates  Relevant Orders    Comprehensive metabolic panel    Mixed hyperlipidemia - Primary     Patient is mainly vegetarian, and his HDL was fantastic  Continue with atorvastatin, as the LDL continues to be slightly elevated  I would not increase the medication, however  Relevant Orders    Comprehensive metabolic panel    Lipid panel    PAC (premature atrial contraction)     Stable  Minimal symptoms  If he has an increase in PACs, would recommend follow with Cardiology  Relevant Orders    Comprehensive metabolic panel      Other Visit Diagnoses     Prostate cancer screening        Check PSA with next labs  Follow-up afterwards  Relevant Orders    PSA, Total Screen    Screening for colon cancer        Patient is due for colonoscopy  Ordered same  Relevant Orders    Ambulatory referral for colonoscopy          COVID 19 Instructions    Nini Rushing was advised to limit contact with others to essential tasks such as getting food, medications, and medical care  Proper handwashing reviewed, and Hand sanitzer when washing is not available  If the patient develops symptoms of COVID 19, the patient should call the office as soon as possible  Please try to download [employer]  Once you do download this on your phone, you will be prompted to add your phone number to the account  After that, he should receive a text from [employer], and use that code to verify your phone number  After that, you should be able to use [employer] Du[employer] to receive and make video calls  Please download [employer] Teams to your phone or computer  We will be transitioning to this platform for Video Visits    Instructions for downloading this are available from the office

## 2020-07-27 NOTE — PROGRESS NOTES
Assessment and Plan:    Problem List Items Addressed This Visit     Anxiety     Doing well at this point with Lexapro  Negative SI, positive contract  Follow in approximately 6-12 months  Hyperglycemia     Blood sugar was slightly up  A1c was quite good, however  Based on this, try to limit carbohydrates  Relevant Orders    Comprehensive metabolic panel    Mixed hyperlipidemia - Primary     Patient is mainly vegetarian, and his HDL was fantastic  Continue with atorvastatin, as the LDL continues to be slightly elevated  I would not increase the medication, however  Relevant Orders    Comprehensive metabolic panel    Lipid panel    PAC (premature atrial contraction)     Stable  Minimal symptoms  If he has an increase in PACs, would recommend follow with Cardiology  Relevant Orders    Comprehensive metabolic panel      Other Visit Diagnoses     Prostate cancer screening        Check PSA with next labs  Follow-up afterwards  Relevant Orders    PSA, Total Screen    Screening for colon cancer        Patient is due for colonoscopy  Ordered same  Relevant Orders    Ambulatory referral for colonoscopy                 Diagnoses and all orders for this visit:    Mixed hyperlipidemia  -     Comprehensive metabolic panel; Future  -     Lipid panel; Future    Hyperglycemia  -     Comprehensive metabolic panel; Future    PAC (premature atrial contraction)  -     Comprehensive metabolic panel; Future    Anxiety    Prostate cancer screening  Comments:  Check PSA with next labs  Follow-up afterwards  Orders:  -     PSA, Total Screen; Future    Screening for colon cancer  Comments:  Patient is due for colonoscopy  Ordered same  Orders:  -     Cancel: Ambulatory referral to Gastroenterology; Future  -     Ambulatory referral for colonoscopy; Future              Subjective:      Patient ID: Rock Fajardo is a 48 y o  male      CC:    Chief Complaint   Patient presents with   Namrata Lewis Follow-up    Results       HPI:    Here to follow up on labs and medical concerns  Has had PAC;s, but lasts less than 10 min  He is feeling well  Hyperglycemia: No concerns from patient  A1C reviewed  Cholesterol: He is on Lipitor, and has been doing well  Anxiety: On Lexapro 20mg  No SI, pos contract  Hernia: Had umbilical hernia repair recently  Has been doing well since  The following portions of the patient's history were reviewed and updated as appropriate: allergies, current medications, past family history, past medical history, past social history, past surgical history and problem list       Review of Systems   Constitutional: Negative  HENT: Negative  Eyes: Negative  Respiratory: Negative  Cardiovascular: Negative  Gastrointestinal: Negative  Endocrine: Negative  Genitourinary: Negative  Musculoskeletal: Negative  Skin: Negative  Allergic/Immunologic: Negative  Neurological: Negative  Hematological: Negative  Psychiatric/Behavioral: Negative  Data to review:   A1C 5 0  Blood sugar 118  Creatinine 1 02, GFR:  85  AST 40, ALT 40  Sodium 141, potassium 4 6, calcium 8 9  Total cholesterol 236, , HDL 96, triglycerides 67  TSH 0 826  White count 7 0, hemoglobin 13 2, hematocrit 40 8, platelets 579  Objective:    Vitals:    07/27/20 1600   BP: 94/70   Pulse: 68   Temp: 98 °F (36 7 °C)   Weight: 92 6 kg (204 lb 2 oz)   Height: 5' 10" (1 778 m)        Physical Exam   Constitutional: He appears well-developed and well-nourished  HENT:   Head: Normocephalic and atraumatic  Neck: Normal range of motion  Neck supple  Cardiovascular: Normal rate, regular rhythm and normal heart sounds  Exam reveals no gallop and no friction rub  No murmur heard  Pulses:       Carotid pulses are 2+ on the right side, and 2+ on the left side  Pulmonary/Chest: Effort normal and breath sounds normal  No respiratory distress   He has no wheezes  He has no rales  Nursing note and vitals reviewed

## 2020-07-27 NOTE — ASSESSMENT & PLAN NOTE
Doing well at this point with Lexapro  Negative SI, positive contract  Follow in approximately 6-12 months

## 2021-01-24 DIAGNOSIS — F41.9 ANXIETY: ICD-10-CM

## 2021-01-25 RX ORDER — ESCITALOPRAM OXALATE 20 MG/1
TABLET ORAL
Qty: 90 TABLET | Refills: 3 | Status: SHIPPED | OUTPATIENT
Start: 2021-01-25

## 2021-02-22 DIAGNOSIS — Z12.11 COLON CANCER SCREENING: Primary | ICD-10-CM

## 2021-03-07 DIAGNOSIS — E78.2 MIXED HYPERLIPIDEMIA: ICD-10-CM

## 2021-03-09 RX ORDER — ATORVASTATIN CALCIUM 40 MG/1
TABLET, FILM COATED ORAL
Qty: 90 TABLET | Refills: 3 | Status: SHIPPED | OUTPATIENT
Start: 2021-03-09 | End: 2021-11-24 | Stop reason: SINTOL

## 2021-03-10 DIAGNOSIS — Z23 ENCOUNTER FOR IMMUNIZATION: ICD-10-CM

## 2021-03-15 ENCOUNTER — IMMUNIZATIONS (OUTPATIENT)
Dept: FAMILY MEDICINE CLINIC | Facility: HOSPITAL | Age: 51
End: 2021-03-15

## 2021-03-15 DIAGNOSIS — Z23 ENCOUNTER FOR IMMUNIZATION: Primary | ICD-10-CM

## 2021-03-15 PROCEDURE — 0001A SARS-COV-2 / COVID-19 MRNA VACCINE (PFIZER-BIONTECH) 30 MCG: CPT

## 2021-03-15 PROCEDURE — 91300 SARS-COV-2 / COVID-19 MRNA VACCINE (PFIZER-BIONTECH) 30 MCG: CPT

## 2021-04-07 ENCOUNTER — IMMUNIZATIONS (OUTPATIENT)
Dept: FAMILY MEDICINE CLINIC | Facility: HOSPITAL | Age: 51
End: 2021-04-07

## 2021-04-07 DIAGNOSIS — Z23 ENCOUNTER FOR IMMUNIZATION: Primary | ICD-10-CM

## 2021-04-07 PROCEDURE — 0002A SARS-COV-2 / COVID-19 MRNA VACCINE (PFIZER-BIONTECH) 30 MCG: CPT

## 2021-04-07 PROCEDURE — 91300 SARS-COV-2 / COVID-19 MRNA VACCINE (PFIZER-BIONTECH) 30 MCG: CPT

## 2021-04-13 PROCEDURE — 88305 TISSUE EXAM BY PATHOLOGIST: CPT | Performed by: PATHOLOGY

## 2021-04-14 ENCOUNTER — HOSPITAL ENCOUNTER (OUTPATIENT)
Facility: HOSPITAL | Age: 51
Setting detail: OBSERVATION
Discharge: HOME | End: 2021-04-15
Attending: EMERGENCY MEDICINE | Admitting: INTERNAL MEDICINE
Payer: COMMERCIAL

## 2021-04-14 ENCOUNTER — LAB REQUISITION (OUTPATIENT)
Dept: LAB | Facility: HOSPITAL | Age: 51
End: 2021-04-14
Payer: COMMERCIAL

## 2021-04-14 DIAGNOSIS — Z12.11 ENCOUNTER FOR SCREENING FOR MALIGNANT NEOPLASM OF COLON: ICD-10-CM

## 2021-04-14 DIAGNOSIS — K92.2 LOWER GI BLEED: Primary | ICD-10-CM

## 2021-04-14 PROBLEM — K92.1 HEMATOCHEZIA: Status: ACTIVE | Noted: 2021-04-14

## 2021-04-14 LAB
ABO GROUP BLD: NORMAL
ABO GROUP BLD: NORMAL
ALBUMIN SERPL BCP-MCNC: 3.4 G/DL (ref 3.5–5)
ALP SERPL-CCNC: 41 U/L (ref 46–116)
ALT SERPL W P-5'-P-CCNC: 28 U/L (ref 12–78)
ANION GAP SERPL CALCULATED.3IONS-SCNC: 8 MMOL/L (ref 4–13)
APTT PPP: 21 SECONDS (ref 23–37)
AST SERPL W P-5'-P-CCNC: 24 U/L (ref 5–45)
ATRIAL RATE: 69 BPM
BASOPHILS # BLD AUTO: 0.03 THOUSANDS/ΜL (ref 0–0.1)
BASOPHILS NFR BLD AUTO: 0 % (ref 0–1)
BILIRUB SERPL-MCNC: 0.43 MG/DL (ref 0.2–1)
BLD GP AB SCN SERPL QL: NEGATIVE
BUN SERPL-MCNC: 23 MG/DL (ref 5–25)
CALCIUM ALBUM COR SERPL-MCNC: 9.1 MG/DL (ref 8.3–10.1)
CALCIUM SERPL-MCNC: 8.6 MG/DL (ref 8.3–10.1)
CHLORIDE SERPL-SCNC: 107 MMOL/L (ref 100–108)
CO2 SERPL-SCNC: 27 MMOL/L (ref 21–32)
CREAT SERPL-MCNC: 1.02 MG/DL (ref 0.6–1.3)
EOSINOPHIL # BLD AUTO: 0.12 THOUSAND/ΜL (ref 0–0.61)
EOSINOPHIL NFR BLD AUTO: 1 % (ref 0–6)
ERYTHROCYTE [DISTWIDTH] IN BLOOD BY AUTOMATED COUNT: 12 % (ref 11.6–15.1)
GFR SERPL CREATININE-BSD FRML MDRD: 85 ML/MIN/1.73SQ M
GLUCOSE SERPL-MCNC: 132 MG/DL (ref 65–140)
HCT VFR BLD AUTO: 34.7 % (ref 36.5–49.3)
HGB BLD-MCNC: 10.3 G/DL (ref 12–17)
HGB BLD-MCNC: 11.6 G/DL (ref 12–17)
IMM GRANULOCYTES # BLD AUTO: 0.05 THOUSAND/UL (ref 0–0.2)
IMM GRANULOCYTES NFR BLD AUTO: 1 % (ref 0–2)
INR PPP: 1.14 (ref 0.84–1.19)
LYMPHOCYTES # BLD AUTO: 1.42 THOUSANDS/ΜL (ref 0.6–4.47)
LYMPHOCYTES NFR BLD AUTO: 15 % (ref 14–44)
MCH RBC QN AUTO: 31.3 PG (ref 26.8–34.3)
MCHC RBC AUTO-ENTMCNC: 33.4 G/DL (ref 31.4–37.4)
MCV RBC AUTO: 94 FL (ref 82–98)
MONOCYTES # BLD AUTO: 0.55 THOUSAND/ΜL (ref 0.17–1.22)
MONOCYTES NFR BLD AUTO: 6 % (ref 4–12)
NEUTROPHILS # BLD AUTO: 7.19 THOUSANDS/ΜL (ref 1.85–7.62)
NEUTS SEG NFR BLD AUTO: 77 % (ref 43–75)
NRBC BLD AUTO-RTO: 0 /100 WBCS
P AXIS: 55 DEGREES
PLATELET # BLD AUTO: 246 THOUSANDS/UL (ref 149–390)
PMV BLD AUTO: 10.5 FL (ref 8.9–12.7)
POTASSIUM SERPL-SCNC: 4.6 MMOL/L (ref 3.5–5.3)
PR INTERVAL: 158 MS
PROT SERPL-MCNC: 6.5 G/DL (ref 6.4–8.2)
PROTHROMBIN TIME: 14.4 SECONDS (ref 11.6–14.5)
QRS AXIS: 60 DEGREES
QRSD INTERVAL: 82 MS
QT INTERVAL: 382 MS
QTC INTERVAL: 409 MS
RBC # BLD AUTO: 3.71 MILLION/UL (ref 3.88–5.62)
RH BLD: NEGATIVE
RH BLD: NEGATIVE
SODIUM SERPL-SCNC: 142 MMOL/L (ref 136–145)
SPECIMEN EXPIRATION DATE: NORMAL
T WAVE AXIS: 59 DEGREES
TROPONIN I SERPL-MCNC: <0.02 NG/ML
VENTRICULAR RATE: 69 BPM
WBC # BLD AUTO: 9.36 THOUSAND/UL (ref 4.31–10.16)

## 2021-04-14 PROCEDURE — 85025 COMPLETE CBC W/AUTO DIFF WBC: CPT | Performed by: PHYSICIAN ASSISTANT

## 2021-04-14 PROCEDURE — 84484 ASSAY OF TROPONIN QUANT: CPT | Performed by: PHYSICIAN ASSISTANT

## 2021-04-14 PROCEDURE — 85018 HEMOGLOBIN: CPT | Performed by: COLON & RECTAL SURGERY

## 2021-04-14 PROCEDURE — 36415 COLL VENOUS BLD VENIPUNCTURE: CPT | Performed by: PHYSICIAN ASSISTANT

## 2021-04-14 PROCEDURE — 99285 EMERGENCY DEPT VISIT HI MDM: CPT

## 2021-04-14 PROCEDURE — 86850 RBC ANTIBODY SCREEN: CPT | Performed by: PHYSICIAN ASSISTANT

## 2021-04-14 PROCEDURE — 85730 THROMBOPLASTIN TIME PARTIAL: CPT | Performed by: PHYSICIAN ASSISTANT

## 2021-04-14 PROCEDURE — 93010 ELECTROCARDIOGRAM REPORT: CPT | Performed by: INTERNAL MEDICINE

## 2021-04-14 PROCEDURE — 85610 PROTHROMBIN TIME: CPT | Performed by: PHYSICIAN ASSISTANT

## 2021-04-14 PROCEDURE — 86901 BLOOD TYPING SEROLOGIC RH(D): CPT | Performed by: PHYSICIAN ASSISTANT

## 2021-04-14 PROCEDURE — 86900 BLOOD TYPING SEROLOGIC ABO: CPT | Performed by: PHYSICIAN ASSISTANT

## 2021-04-14 PROCEDURE — 80053 COMPREHEN METABOLIC PANEL: CPT | Performed by: PHYSICIAN ASSISTANT

## 2021-04-14 PROCEDURE — 93005 ELECTROCARDIOGRAM TRACING: CPT

## 2021-04-14 PROCEDURE — 99220 PR INITIAL OBSERVATION CARE/DAY 70 MINUTES: CPT | Performed by: INTERNAL MEDICINE

## 2021-04-14 PROCEDURE — 99285 EMERGENCY DEPT VISIT HI MDM: CPT | Performed by: PHYSICIAN ASSISTANT

## 2021-04-14 RX ORDER — ATORVASTATIN CALCIUM 40 MG/1
40 TABLET, FILM COATED ORAL
Status: DISCONTINUED | OUTPATIENT
Start: 2021-04-14 | End: 2021-04-15 | Stop reason: HOSPADM

## 2021-04-14 RX ORDER — POLYETHYLENE GLYCOL 3350 17 G/17G
17 POWDER, FOR SOLUTION ORAL DAILY
Status: DISCONTINUED | OUTPATIENT
Start: 2021-04-14 | End: 2021-04-14

## 2021-04-14 RX ORDER — SODIUM CHLORIDE 9 MG/ML
125 INJECTION, SOLUTION INTRAVENOUS CONTINUOUS
Status: DISCONTINUED | OUTPATIENT
Start: 2021-04-14 | End: 2021-04-14

## 2021-04-14 RX ORDER — MAGNESIUM HYDROXIDE/ALUMINUM HYDROXICE/SIMETHICONE 120; 1200; 1200 MG/30ML; MG/30ML; MG/30ML
30 SUSPENSION ORAL EVERY 6 HOURS PRN
Status: DISCONTINUED | OUTPATIENT
Start: 2021-04-14 | End: 2021-04-15 | Stop reason: HOSPADM

## 2021-04-14 RX ORDER — DEXTROSE AND SODIUM CHLORIDE 5; .45 G/100ML; G/100ML
75 INJECTION, SOLUTION INTRAVENOUS CONTINUOUS
Status: DISCONTINUED | OUTPATIENT
Start: 2021-04-14 | End: 2021-04-15

## 2021-04-14 RX ORDER — ESCITALOPRAM OXALATE 20 MG/1
20 TABLET ORAL DAILY
Status: DISCONTINUED | OUTPATIENT
Start: 2021-04-14 | End: 2021-04-15 | Stop reason: HOSPADM

## 2021-04-14 RX ORDER — CALCIUM CARBONATE 200(500)MG
1000 TABLET,CHEWABLE ORAL DAILY PRN
Status: DISCONTINUED | OUTPATIENT
Start: 2021-04-14 | End: 2021-04-15 | Stop reason: HOSPADM

## 2021-04-14 RX ORDER — ONDANSETRON 2 MG/ML
4 INJECTION INTRAMUSCULAR; INTRAVENOUS EVERY 6 HOURS PRN
Status: DISCONTINUED | OUTPATIENT
Start: 2021-04-14 | End: 2021-04-15 | Stop reason: HOSPADM

## 2021-04-14 RX ADMIN — DEXTROSE AND SODIUM CHLORIDE 75 ML/HR: 5; .45 INJECTION, SOLUTION INTRAVENOUS at 16:13

## 2021-04-14 RX ADMIN — SODIUM CHLORIDE 1000 ML: 0.9 INJECTION, SOLUTION INTRAVENOUS at 13:57

## 2021-04-14 NOTE — ASSESSMENT & PLAN NOTE
Presenting with painless hematochezia after colonoscopy with suspected polypectomy bleed  This was performed by Colorectal surgery, Dr Deacon Jay  Will trend hemoglobin  Case discussed with emergency room staff and Dr Deacon Jay who was where the patient  Will plan to observe overnight    Start MiraLax to see if the patient develops any additional bleeding  Hold any additional blood thinners  Consent performed if blood transfusion is needed
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
## 2021-04-14 NOTE — PLAN OF CARE
Problem: PAIN - ADULT  Goal: Verbalizes/displays adequate comfort level or baseline comfort level  Description: Interventions:  - Encourage patient to monitor pain and request assistance  - Assess pain using appropriate pain scale  - Administer analgesics based on type and severity of pain and evaluate response  - Implement non-pharmacological measures as appropriate and evaluate response  - Consider cultural and social influences on pain and pain management  - Notify physician/advanced practitioner if interventions unsuccessful or patient reports new pain  Outcome: Progressing     Problem: INFECTION - ADULT  Goal: Absence or prevention of progression during hospitalization  Description: INTERVENTIONS:  - Assess and monitor for signs and symptoms of infection  - Monitor lab/diagnostic results  - Monitor all insertion sites, i e  indwelling lines, tubes, and drains  - Monitor endotracheal if appropriate and nasal secretions for changes in amount and color  - [place] appropriate cooling/warming therapies per order  - Administer medications as ordered  - Instruct and encourage patient and family to use good hand hygiene technique  - Identify and instruct in appropriate isolation precautions for identified infection/condition  Outcome: Progressing  Goal: Absence of fever/infection during neutropenic period  Description: INTERVENTIONS:  - Monitor WBC    Outcome: Progressing     Problem: SAFETY ADULT  Goal: Patient will remain free of falls  Description: INTERVENTIONS:  - Assess patient frequently for physical needs  -  Identify cognitive and physical deficits and behaviors that affect risk of falls    -  [place] fall precautions as indicated by assessment   - Educate patient/family on patient safety including physical limitations  - Instruct patient to call for assistance with activity based on assessment  - Modify environment to reduce risk of injury  - Consider OT/PT consult to assist with strengthening/mobility  Outcome: Progressing  Goal: Maintain or return to baseline ADL function  Description: INTERVENTIONS:  -  Assess patient's ability to carry out ADLs; assess patient's baseline for ADL function and identify physical deficits which impact ability to perform ADLs (bathing, care of mouth/teeth, toileting, grooming, dressing, etc )  - Assess/evaluate cause of self-care deficits   - Assess range of motion  - Assess patient's mobility; develop plan if impaired  - Assess patient's need for assistive devices and provide as appropriate  - Encourage maximum independence but intervene and supervise when necessary  - Involve family in performance of ADLs  - Assess for home care needs following discharge   - Consider OT consult to assist with ADL evaluation and planning for discharge  - Provide patient education as appropriate  Outcome: Progressing  Goal: Maintain or return mobility status to optimal level  Description: INTERVENTIONS:  - Assess patient's baseline mobility status (ambulation, transfers, stairs, etc )    - Identify cognitive and physical deficits and behaviors that affect mobility  - Identify mobility aids required to assist with transfers and/or ambulation (gait belt, sit-to-stand, lift, walker, cane, etc )  - [place] fall precautions as indicated by assessment  - Record patient progress and toleration of activity level on Mobility SBAR; progress patient to next Phase/Stage  - Instruct patient to call for assistance with activity based on assessment  - Consider rehabilitation consult to assist with strengthening/weightbearing, etc   Outcome: Progressing     Problem: DISCHARGE PLANNING  Goal: Discharge to home or other facility with appropriate resources  Description: INTERVENTIONS:  - Identify barriers to discharge w/patient and caregiver  - Arrange for needed discharge resources and transportation as appropriate  - Identify discharge learning needs (meds, wound care, etc )  - Arrange for interpretive services to assist at discharge as needed  - Refer to Case Management Department for coordinating discharge planning if the patient needs post-hospital services based on physician/advanced practitioner order or complex needs related to functional status, cognitive ability, or social support system  Outcome: Progressing     Problem: Knowledge Deficit  Goal: Patient/family/caregiver demonstrates understanding of disease process, treatment plan, medications, and discharge instructions  Description: Complete learning assessment and assess knowledge base    Interventions:  - Provide teaching at level of understanding  - Provide teaching via preferred learning methods  Outcome: Progressing     Problem: GASTROINTESTINAL - ADULT  Goal: Maintains or returns to baseline bowel function  Description: INTERVENTIONS:  - Assess bowel function  - Encourage oral fluids to ensure adequate hydration  - Administer IV fluids if ordered to ensure adequate hydration  - Administer ordered medications as needed  - Encourage mobilization and activity  - Consider nutritional services referral to assist patient with adequate nutrition and appropriate food choices  Outcome: Progressing     Problem: HEMATOLOGIC - ADULT  Goal: Maintains hematologic stability  Description: INTERVENTIONS  - Assess for signs and symptoms of bleeding or hemorrhage  - Monitor labs  - Administer supportive blood products/factors as ordered and appropriate  Outcome: Progressing
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
## 2021-04-15 VITALS
HEART RATE: 74 BPM | RESPIRATION RATE: 18 BRPM | WEIGHT: 270.95 LBS | HEIGHT: 70 IN | DIASTOLIC BLOOD PRESSURE: 73 MMHG | BODY MASS INDEX: 38.79 KG/M2 | OXYGEN SATURATION: 95 % | SYSTOLIC BLOOD PRESSURE: 119 MMHG | TEMPERATURE: 98.4 F

## 2021-04-15 LAB
HGB BLD-MCNC: 9.3 G/DL (ref 12–17)
HGB BLD-MCNC: 9.6 G/DL (ref 12–17)

## 2021-04-15 PROCEDURE — 85018 HEMOGLOBIN: CPT | Performed by: COLON & RECTAL SURGERY

## 2021-04-15 PROCEDURE — 99225 PR SBSQ OBSERVATION CARE/DAY 25 MINUTES: CPT | Performed by: NURSE PRACTITIONER

## 2021-04-15 RX ADMIN — ATORVASTATIN CALCIUM 40 MG: 40 TABLET, FILM COATED ORAL at 18:00

## 2021-04-15 RX ADMIN — ESCITALOPRAM OXALATE 20 MG: 20 TABLET, FILM COATED ORAL at 08:52

## 2021-04-15 RX ADMIN — DEXTROSE AND SODIUM CHLORIDE 75 ML/HR: 5; .45 INJECTION, SOLUTION INTRAVENOUS at 05:22

## 2021-04-15 NOTE — NURSING NOTE
Agree with previous RN's assessment  Pt denies any pain or rectal bleeding  Pt sitting in chair watching television  Call bell and belongings within reach  Will continue to monitor

## 2021-04-15 NOTE — PROGRESS NOTES
[facility] 48  Progress Note - Arturo Cordero 1970, 46 y o  male MRN: [id number]  Unit/Bed#: E5 -01 Encounter: [id number]  Primary Care Provider: Mu Schwartz MD   Date and time admitted to hospital: 2021  1:39 PM    * Hematochezia  Assessment & Plan  POA- painless hematochezia after colonoscopy with suspected polypectomy bleed  This was performed by Colorectal surgery, Dr Ina Seymour  HGB stable  No further hematochezia  Appreciate colorectal consultation- will advance diet as tolerated per recommendations  Probable discharge after evening meal    Anxiety  Assessment & Plan  Continue Lexapro 20 mg daily- mood is stable     Mixed hyperlipidemia  Assessment & Plan  Resume statin dose home regimen 40 mg Atorvastatin daily    VTE Pharmacologic Prophylaxis:   Pharmacologic: Pharmacologic VTE Prophylaxis contraindicated due to hematochezia  Mechanical VTE Prophylaxis in Place: Yes    Patient Centered Rounds: I have performed bedside rounds with nursing staff today  Discussions with Specialists or Other Care Team Provider: colorectal     Education and Discussions with Family / Patient: pt , he declines call to family member    Time Spent for Care: 20 minutes  More than 50% of total time spent on counseling and coordination of care as described above  Current Length of Stay: 0 day(s)    Current Patient Status: Observation   Certification Statement: The patient will continue to require additional inpatient hospital stay due to medical stability    Discharge Plan: home    Code Status: Level 1 - Full Code      Subjective:   Denies further rectal bleeding, abdominal pain, CP, dyspnea    Objective:     Vitals:   Temp (24hrs), Av 5 °F (36 4 °C), Min:96 6 °F (35 9 °C), Max:98 4 °F (36 9 °C)    Temp:  [96 6 °F (35 9 °C)-98 4 °F (36 9 °C)] 98 4 °F (36 9 °C)  HR:  [64-86] 64  Resp:  [16-18] 18  BP: ()/(56-79) 122/70  SpO2:  [92 %-100 %] 96 %  Body mass index is 38 88 kg/m²  Input and Output Summary (last 24 hours): Intake/Output Summary (Last 24 hours) at 4/15/2021 1348  Last data filed at 4/14/2021 1859  Gross per 24 hour   Intake 1207 5 ml   Output --   Net 1207 5 ml       Physical Exam:     Physical Exam  Vitals signs and nursing note reviewed  Constitutional:       Appearance: Normal appearance  He is normal weight  HENT:      Head: Normocephalic and atraumatic  Eyes:      Conjunctiva/sclera: Conjunctivae normal    Neck:      Musculoskeletal: Normal range of motion and neck supple  Cardiovascular:      Rate and Rhythm: Normal rate and regular rhythm  Pulses: Normal pulses  Heart sounds: Normal heart sounds  Pulmonary:      Effort: Pulmonary effort is normal       Breath sounds: Normal breath sounds  Abdominal:      General: Abdomen is flat  Palpations: Abdomen is soft  Tenderness: There is no abdominal tenderness  Musculoskeletal: Normal range of motion  Skin:     General: Skin is warm and dry  Capillary Refill: Capillary refill takes less than 2 seconds  Neurological:      General: No focal deficit present  Mental Status: He is alert and oriented to person, place, and time  Cranial Nerves: No cranial nerve deficit  Sensory: No sensory deficit  Motor: No weakness  Coordination: Coordination normal       Gait: Gait normal    Psychiatric:         Mood and Affect: Mood normal          Behavior: Behavior normal        Additional Data:     Labs:    Results from last 7 days   Lab Units 04/15/21  0518  04/14/21  1345   WBC Thousand/uL  --   --  9 36   HEMOGLOBIN g/dL 9 6*   < > 11 6*   HEMATOCRIT %  --   --  34 7*   PLATELETS Thousands/uL  --   --  246   NEUTROS PCT %  --   --  77*   LYMPHS PCT %  --   --  15   MONOS PCT %  --   --  6   EOS PCT %  --   --  1    < > = values in this interval not displayed       Results from last 7 days   Lab Units 04/14/21  1345   SODIUM mmol/L 142   POTASSIUM mmol/L 4 6 CHLORIDE mmol/L 107   CO2 mmol/L 27   BUN mg/dL 23   CREATININE mg/dL 1 02   ANION GAP mmol/L 8   CALCIUM mg/dL 8 6   ALBUMIN g/dL 3 4*   TOTAL BILIRUBIN mg/dL 0 43   ALK PHOS U/L 41*   ALT U/L 28   AST U/L 24   GLUCOSE RANDOM mg/dL 132     Results from last 7 days   Lab Units 04/14/21  1345   INR  1 14                       * I Have Reviewed All Lab Data Listed Above  * Additional Pertinent Lab Tests Reviewed: [place] 66 Admission Reviewed    Imaging:    Imaging Reports Reviewed Today Include: all  Imaging Personally Reviewed by Myself Includes:  none    Recent Cultures (last 7 days):           Last 24 Hours Medication List:   Current Facility-Administered Medications   Medication Dose Route Frequency Provider Last Rate    aluminum-magnesium hydroxide-simethicone  30 mL Oral Q6H PRN Rafael Mayo,       atorvastatin  40 mg Oral After Dinner Gerard Daniel, DO      calcium carbonate  1,000 mg Oral Daily PRN Gerard Sam,       escitalopram  20 mg Oral Daily Gerard Daniel, DO      ondansetron  4 mg Intravenous Q6H PRN Rafael Mayo,           Today, Patient Was Seen By: DELLA Solo    ** Please Note: Dictation voice to text software may have been used in the creation of this document   **

## 2021-04-15 NOTE — PROGRESS NOTES
The patient had an uneventful night  He has not had any bleeding since admission to the hospital   He has only had 1 small bowel movement since admission  His hemoglobin has drifted down to 9 5  He is not complaining of any abdominal pain  His vital signs have been stable  His abdomen is softly distended  He does not have any abdominal tenderness  The patient has experienced a post polypectomy hemorrhage  It appears to have stopped  I will start him on a clear liquid diet  We can advance his diet as tolerated

## 2021-04-15 NOTE — ASSESSMENT & PLAN NOTE
POA- painless hematochezia after colonoscopy with suspected polypectomy bleed   HGB stable  No further hematochezia  Appreciate colorectal consultation  Tolerating diet  Discharge home- follow up with colorectal surgery as needed

## 2021-04-15 NOTE — PLAN OF CARE
Problem: PAIN - ADULT  Goal: Verbalizes/displays adequate comfort level or baseline comfort level  Description: Interventions:  - Encourage patient to monitor pain and request assistance  - Assess pain using appropriate pain scale  - Administer analgesics based on type and severity of pain and evaluate response  - Implement non-pharmacological measures as appropriate and evaluate response  - Consider cultural and social influences on pain and pain management  - Notify physician/advanced practitioner if interventions unsuccessful or patient reports new pain  Outcome: Progressing     Problem: INFECTION - ADULT  Goal: Absence or prevention of progression during hospitalization  Description: INTERVENTIONS:  - Assess and monitor for signs and symptoms of infection  - Monitor lab/diagnostic results  - Monitor all insertion sites, i e  indwelling lines, tubes, and drains  - Monitor endotracheal if appropriate and nasal secretions for changes in amount and color  - [place] appropriate cooling/warming therapies per order  - Administer medications as ordered  - Instruct and encourage patient and family to use good hand hygiene technique  - Identify and instruct in appropriate isolation precautions for identified infection/condition  Outcome: Progressing  Goal: Absence of fever/infection during neutropenic period  Description: INTERVENTIONS:  - Monitor WBC    Outcome: Progressing     Problem: SAFETY ADULT  Goal: Patient will remain free of falls  Description: INTERVENTIONS:  - Assess patient frequently for physical needs  -  Identify cognitive and physical deficits and behaviors that affect risk of falls    -  [place] fall precautions as indicated by assessment   - Educate patient/family on patient safety including physical limitations  - Instruct patient to call for assistance with activity based on assessment  - Modify environment to reduce risk of injury  - Consider OT/PT consult to assist with strengthening/mobility  Outcome: Progressing  Goal: Maintain or return to baseline ADL function  Description: INTERVENTIONS:  -  Assess patient's ability to carry out ADLs; assess patient's baseline for ADL function and identify physical deficits which impact ability to perform ADLs (bathing, care of mouth/teeth, toileting, grooming, dressing, etc )  - Assess/evaluate cause of self-care deficits   - Assess range of motion  - Assess patient's mobility; develop plan if impaired  - Assess patient's need for assistive devices and provide as appropriate  - Encourage maximum independence but intervene and supervise when necessary  - Involve family in performance of ADLs  - Assess for home care needs following discharge   - Consider OT consult to assist with ADL evaluation and planning for discharge  - Provide patient education as appropriate  Outcome: Progressing  Goal: Maintain or return mobility status to optimal level  Description: INTERVENTIONS:  - Assess patient's baseline mobility status (ambulation, transfers, stairs, etc )    - Identify cognitive and physical deficits and behaviors that affect mobility  - Identify mobility aids required to assist with transfers and/or ambulation (gait belt, sit-to-stand, lift, walker, cane, etc )  - [place] fall precautions as indicated by assessment  - Record patient progress and toleration of activity level on Mobility SBAR; progress patient to next Phase/Stage  - Instruct patient to call for assistance with activity based on assessment  - Consider rehabilitation consult to assist with strengthening/weightbearing, etc   Outcome: Progressing     Problem: DISCHARGE PLANNING  Goal: Discharge to home or other facility with appropriate resources  Description: INTERVENTIONS:  - Identify barriers to discharge w/patient and caregiver  - Arrange for needed discharge resources and transportation as appropriate  - Identify discharge learning needs (meds, wound care, etc )  - Arrange for interpretive services to assist at discharge as needed  - Refer to Case Management Department for coordinating discharge planning if the patient needs post-hospital services based on physician/advanced practitioner order or complex needs related to functional status, cognitive ability, or social support system  Outcome: Progressing     Problem: Knowledge Deficit  Goal: Patient/family/caregiver demonstrates understanding of disease process, treatment plan, medications, and discharge instructions  Description: Complete learning assessment and assess knowledge base    Interventions:  - Provide teaching at level of understanding  - Provide teaching via preferred learning methods  Outcome: Progressing     Problem: GASTROINTESTINAL - ADULT  Goal: Maintains or returns to baseline bowel function  Description: INTERVENTIONS:  - Assess bowel function  - Encourage oral fluids to ensure adequate hydration  - Administer IV fluids if ordered to ensure adequate hydration  - Administer ordered medications as needed  - Encourage mobilization and activity  - Consider nutritional services referral to assist patient with adequate nutrition and appropriate food choices  Outcome: Progressing     Problem: HEMATOLOGIC - ADULT  Goal: Maintains hematologic stability  Description: INTERVENTIONS  - Assess for signs and symptoms of bleeding or hemorrhage  - Monitor labs  - Administer supportive blood products/factors as ordered and appropriate  Outcome: Progressing

## 2021-04-15 NOTE — DISCHARGE SUMMARY
[facility]  Discharge- David Shen 1970, 46 y o  male MRN: [id number]  Unit/Bed#: E5 -01 Encounter: [id number]  Primary Care Provider: Jono Diego MD   Date and time admitted to hospital: 4/14/2021  1:39 PM    * Hematochezia  Assessment & Plan  POA- painless hematochezia after colonoscopy with suspected polypectomy bleed   HGB stable  No further hematochezia  Appreciate colorectal consultation  Tolerating diet  Discharge home- follow up with colorectal surgery as needed    Anxiety  Assessment & Plan  Continue Lexapro 20 mg daily- mood is stable     Mixed hyperlipidemia  Assessment & Plan  Resume statin dose home regimen 40 mg Atorvastatin daily    Discharging Physician / Practitioner: DELLA Berry  PCP: Jono Diego MD  Admission Date:   Admission Orders (From admission, onward)     Ordered        04/14/21 1419  Place in Observation  Once                   Discharge Date: 04/15/21    Resolved Problems  Date Reviewed: 4/14/2021    None          Consultations During Hospital Stay:  · Colorectal surgery     Procedures Performed:   · none    Significant Findings / Test Results:   · None     Incidental Findings:   · none     Test Results Pending at Discharge (will require follow up): · None      Outpatient Tests Requested:  · None     Complications:  None     Reason for Admission: Hematochezia    Hospital Course:     David Shen is a 46 y o  male patient who originally presented to the hospital on 4/14/2021 due to several bloody bowel movements and passing a clot associated with abdominal pain and lightheadedness after screening colonoscopy with removal of 4 polyps from the cecum earlier in the day  Admitted and placed on bowel rest with colorectal consultation  No further episodes of bleeding while in hospital, tolerated diet prior to discharge  S O  in room at time of discharge discussion      Please see above list of diagnoses and related plan for additional information  Condition at Discharge: good     Discharge Day Visit / Exam:     * Please refer to separate progress note for these details *    Discharge instructions/Information to patient and family:   See after visit summary for information provided to patient and family  Provisions for Follow-Up Care:  See after visit summary for information related to follow-up care and any pertinent home health orders  Disposition:     Home    For Discharges to [address] [facility]:   · Not Applicable to this Patient - Not Applicable to this Patient    Planned Readmission: none      Discharge Statement:  I spent <30 minutes discharging the patient  This time was spent on the day of discharge  I had direct contact with the patient on the day of discharge  Greater than 50% of the total time was spent examining patient, answering all patient questions, arranging and discussing plan of care with patient as well as directly providing post-discharge instructions  Additional time then spent on discharge activities  Discharge Medications:  See after visit summary for reconciled discharge medications provided to patient and family        ** Please Note: This note has been constructed using a voice recognition system **

## 2021-04-15 NOTE — ASSESSMENT & PLAN NOTE
POA- painless hematochezia after colonoscopy with suspected polypectomy bleed  This was performed by Colorectal surgery, Dr Phong Coronado  HGB stable  No further hematochezia  Appreciate colorectal consultation- will advance diet as tolerated per recommendations  Probable discharge after evening meal

## 2021-04-15 NOTE — UTILIZATION REVIEW
Initial Clinical Review    Admission: Date/Time/Statement:   Admission Orders (From admission, onward)     Ordered        04/14/21 1419  Place in Observation  Once                   Orders Placed This Encounter   Procedures    Place in Observation     Standing Status:   Standing     Number of Occurrences:   1     Order Specific Question:   Level of Care     Answer:   Med Surg [16]     ED Arrival Information     Expected Arrival Acuity Means of Arrival Escorted By Service Admission Type    4/14/2021 4/14/2021 13:23 Emergent Walk-In Family [facility] General Medicine Emergency    Arrival Complaint    Polyp Bleed        Chief Complaint   Patient presents with    Rectal Bleeding     colonoscopy yesterday  removed 4 poylps  rectal bleeding since this morning  pt lightheaded, pale  denies vomiting  Initial Presentation:    46  Y O male presents to ED from home with 4 episodes of hematochezia and  bright red blood per rectum   Had a  Colonoscopy the day prior to this and had  4 colonic polyps removed  Feels weak, dizzy,pale  Hypotensive  Initially  On ED arrival, resolved  Admit  Observation with  Hematochezia and plan is  Monitor labs/hemoglobin, transfuse if needed and colon/rectal consult  Colon rectal consult   ( 4/14)  Appears to be a post polypectomy bleed  Should resolve spontaneously  Monitor hemoglobin  Date:  4/15       Day 2:   No further bleeding  Hemoglobin  9 5  Can start cl  Liq diet  Denies  Abdominal pain      ED Triage Vitals   Temperature Pulse Respirations Blood Pressure SpO2   04/14/21 1332 04/14/21 1332 04/14/21 1332 04/14/21 1332 04/14/21 1332   97 9 °F (36 6 °C) 86 18 96/50 98 %      Temp Source Heart Rate Source Patient Position - Orthostatic VS BP Location FiO2 (%)   04/14/21 1332 04/14/21 1332 04/14/21 1332 04/14/21 1332 --   Oral Monitor Sitting Right arm       Pain Score       04/14/21 1554       No Pain          Wt Readings from Last 1 Encounters:   04/14/21 123 kg (270 lb 15 1 oz)     Additional Vital Signs:   04/14/21 2300  96 6 °F (35 9 °C)Abnormal   66  18  107/66  --  92 %  None (Room air)  Lying   04/14/21 1703  97 4 °F (36 3 °C)Abnormal   86  18  117/79  87  100 %  None (Room air)  Sitting   04/14/21 1639  --  69  18  108/66  81  98 %  None (Room air)  Lying   04/14/21 1554  --  68  18  99/56  --  98 %  None (Room air)  Lying   04/14/21 1446  --  65  16  121/71  91  99 %  None (Room air)  Lying   04/14/21 1332  97 9 °F (36 6 °C)  86  18  96/50  --  98 %  None (Room air)  Sitting       Pertinent Labs/Diagnostic Test Results:   EKG    ( 4/14)     NSR    HR  69     Normal QRS  Normal T waves      Results from last 7 days   Lab Units 04/15/21  0518 04/15/21  0034 04/14/21  1750 04/14/21  1345   WBC Thousand/uL  --   --   --  9 36   HEMOGLOBIN g/dL 9 6* 9 3* 10 3* 11 6*   HEMATOCRIT %  --   --   --  34 7*   PLATELETS Thousands/uL  --   --   --  246   NEUTROS ABS Thousands/µL  --   --   --  7 19         Results from last 7 days   Lab Units 04/14/21  1345   SODIUM mmol/L 142   POTASSIUM mmol/L 4 6   CHLORIDE mmol/L 107   CO2 mmol/L 27   ANION GAP mmol/L 8   BUN mg/dL 23   CREATININE mg/dL 1 02   EGFR ml/min/1 73sq m 85   CALCIUM mg/dL 8 6     Results from last 7 days   Lab Units 04/14/21  1345   AST U/L 24   ALT U/L 28   ALK PHOS U/L 41*   TOTAL PROTEIN g/dL 6 5   ALBUMIN g/dL 3 4*   TOTAL BILIRUBIN mg/dL 0 43         Results from last 7 days   Lab Units 04/14/21  1345   GLUCOSE RANDOM mg/dL 132               Results from last 7 days   Lab Units 04/14/21  1345   TROPONIN I ng/mL <0 02         Results from last 7 days   Lab Units 04/14/21  1345   PROTIME seconds 14 4   INR  1 14   PTT seconds 21*         ED Treatment:   Medication Administration from 04/14/2021 1305 to 04/14/2021 1647       Date/Time Order Dose Route Action Comments     04/14/2021 1515 sodium chloride 0 9 % bolus 1,000 mL 0 mL Intravenous Stopped      04/14/2021 1357 sodium chloride 0 9 % bolus 1,000 mL 1,000 mL Intravenous New Bag      04/14/2021 1526 escitalopram (LEXAPRO) tablet 20 mg 20 mg Oral Not Given      04/14/2021 1527 polyethylene glycol (MIRALAX) packet 17 g 17 g Oral Not Given      04/14/2021 1613 dextrose 5 % and sodium chloride 0 45 % infusion 75 mL/hr Intravenous New Bag         Present on Admission:   Anxiety   Mixed hyperlipidemia      Admitting Diagnosis: Rectal bleeding [K62 5]  Lower GI bleed [K92 2]  Age/Sex: 46 y o  male  Admission Orders:  Scheduled Medications:  atorvastatin, 40 mg, Oral, After Dinner  escitalopram, 20 mg, Oral, Daily      Continuous IV Infusions:     PRN Meds:  aluminum-magnesium hydroxide-simethicone, 30 mL, Oral, Q6H PRN  calcium carbonate, 1,000 mg, Oral, Daily PRN  ondansetron, 4 mg, Intravenous, Q6H PRN        IP CONSULT TO COLORECTAL SURGERY    Network Utilization Review Department  ATTENTION: Please call with any questions or concerns to [phone] and carefully listen to the prompts so that you are directed to the right person  All voicemails are confidential   Sudie Crigler all requests for admission clinical reviews, approved or denied determinations and any other requests to dedicated fax number below belonging to the campus where the patient is receiving treatment  List of dedicated fax numbers for the Facilities:  [address] DENIALS (Administrative/Medical Necessity) [phone]   [address] (Maternity/NICU/Pediatrics) [phone]   [address] [place] [address] (Alvaro Friend) [address] [address] Mariah Varner 1481 [phone]   [facility]  [address] [place] [address] [phone]

## 2021-04-15 NOTE — PLAN OF CARE
Problem: PAIN - ADULT  Goal: Verbalizes/displays adequate comfort level or baseline comfort level  Description: Interventions:  - Encourage patient to monitor pain and request assistance  - Assess pain using appropriate pain scale  - Administer analgesics based on type and severity of pain and evaluate response  - Implement non-pharmacological measures as appropriate and evaluate response  - Consider cultural and social influences on pain and pain management  - Notify physician/advanced practitioner if interventions unsuccessful or patient reports new pain  Outcome: Progressing     Problem: INFECTION - ADULT  Goal: Absence or prevention of progression during hospitalization  Description: INTERVENTIONS:  - Assess and monitor for signs and symptoms of infection  - Monitor lab/diagnostic results  - Monitor all insertion sites, i e  indwelling lines, tubes, and drains  - Monitor endotracheal if appropriate and nasal secretions for changes in amount and color  - [place] appropriate cooling/warming therapies per order  - Administer medications as ordered  - Instruct and encourage patient and family to use good hand hygiene technique  - Identify and instruct in appropriate isolation precautions for identified infection/condition  Outcome: Progressing  Goal: Absence of fever/infection during neutropenic period  Description: INTERVENTIONS:  - Monitor WBC    Outcome: Progressing     Problem: SAFETY ADULT  Goal: Patient will remain free of falls  Description: INTERVENTIONS:  - Assess patient frequently for physical needs  -  Identify cognitive and physical deficits and behaviors that affect risk of falls    -  [place] fall precautions as indicated by assessment   - Educate patient/family on patient safety including physical limitations  - Instruct patient to call for assistance with activity based on assessment  - Modify environment to reduce risk of injury  - Consider OT/PT consult to assist with strengthening/mobility  Outcome: Progressing  Goal: Maintain or return to baseline ADL function  Description: INTERVENTIONS:  -  Assess patient's ability to carry out ADLs; assess patient's baseline for ADL function and identify physical deficits which impact ability to perform ADLs (bathing, care of mouth/teeth, toileting, grooming, dressing, etc )  - Assess/evaluate cause of self-care deficits   - Assess range of motion  - Assess patient's mobility; develop plan if impaired  - Assess patient's need for assistive devices and provide as appropriate  - Encourage maximum independence but intervene and supervise when necessary  - Involve family in performance of ADLs  - Assess for home care needs following discharge   - Consider OT consult to assist with ADL evaluation and planning for discharge  - Provide patient education as appropriate  Outcome: Progressing  Goal: Maintain or return mobility status to optimal level  Description: INTERVENTIONS:  - Assess patient's baseline mobility status (ambulation, transfers, stairs, etc )    - Identify cognitive and physical deficits and behaviors that affect mobility  - Identify mobility aids required to assist with transfers and/or ambulation (gait belt, sit-to-stand, lift, walker, cane, etc )  - [place] fall precautions as indicated by assessment  - Record patient progress and toleration of activity level on Mobility SBAR; progress patient to next Phase/Stage  - Instruct patient to call for assistance with activity based on assessment  - Consider rehabilitation consult to assist with strengthening/weightbearing, etc   Outcome: Progressing     Problem: DISCHARGE PLANNING  Goal: Discharge to home or other facility with appropriate resources  Description: INTERVENTIONS:  - Identify barriers to discharge w/patient and caregiver  - Arrange for needed discharge resources and transportation as appropriate  - Identify discharge learning needs (meds, wound care, etc )  - Arrange for interpretive services to assist at discharge as needed  - Refer to Case Management Department for coordinating discharge planning if the patient needs post-hospital services based on physician/advanced practitioner order or complex needs related to functional status, cognitive ability, or social support system  Outcome: Progressing     Problem: Knowledge Deficit  Goal: Patient/family/caregiver demonstrates understanding of disease process, treatment plan, medications, and discharge instructions  Description: Complete learning assessment and assess knowledge base    Interventions:  - Provide teaching at level of understanding  - Provide teaching via preferred learning methods  Outcome: Progressing     Problem: GASTROINTESTINAL - ADULT  Goal: Maintains or returns to baseline bowel function  Description: INTERVENTIONS:  - Assess bowel function  - Encourage oral fluids to ensure adequate hydration  - Administer IV fluids if ordered to ensure adequate hydration  - Administer ordered medications as needed  - Encourage mobilization and activity  - Consider nutritional services referral to assist patient with adequate nutrition and appropriate food choices  Outcome: Progressing     Problem: HEMATOLOGIC - ADULT  Goal: Maintains hematologic stability  Description: INTERVENTIONS  - Assess for signs and symptoms of bleeding or hemorrhage  - Monitor labs  - Administer supportive blood products/factors as ordered and appropriate  Outcome: Progressing

## 2021-04-16 ENCOUNTER — DOCUMENTATION (OUTPATIENT)
Dept: FAMILY MEDICINE CLINIC | Facility: CLINIC | Age: 51
End: 2021-04-16

## 2021-04-16 ENCOUNTER — TRANSITIONAL CARE MANAGEMENT (OUTPATIENT)
Dept: FAMILY MEDICINE CLINIC | Facility: CLINIC | Age: 51
End: 2021-04-16

## 2021-04-16 DIAGNOSIS — D62 ANEMIA DUE TO ACUTE BLOOD LOSS: Primary | ICD-10-CM

## 2021-05-03 ENCOUNTER — APPOINTMENT (OUTPATIENT)
Dept: LAB | Facility: MEDICAL CENTER | Age: 51
End: 2021-05-03
Payer: COMMERCIAL

## 2021-05-03 DIAGNOSIS — Z12.5 PROSTATE CANCER SCREENING: ICD-10-CM

## 2021-05-03 DIAGNOSIS — I49.1 PAC (PREMATURE ATRIAL CONTRACTION): ICD-10-CM

## 2021-05-03 DIAGNOSIS — D62 ANEMIA DUE TO ACUTE BLOOD LOSS: ICD-10-CM

## 2021-05-03 DIAGNOSIS — E78.2 MIXED HYPERLIPIDEMIA: ICD-10-CM

## 2021-05-03 DIAGNOSIS — R73.9 HYPERGLYCEMIA: ICD-10-CM

## 2021-05-03 LAB
ALBUMIN SERPL BCP-MCNC: 3.8 G/DL (ref 3.5–5)
ALP SERPL-CCNC: 45 U/L (ref 46–116)
ALT SERPL W P-5'-P-CCNC: 28 U/L (ref 12–78)
ANION GAP SERPL CALCULATED.3IONS-SCNC: 5 MMOL/L (ref 4–13)
AST SERPL W P-5'-P-CCNC: 18 U/L (ref 5–45)
BASOPHILS # BLD AUTO: 0.03 THOUSANDS/ΜL (ref 0–0.1)
BASOPHILS NFR BLD AUTO: 1 % (ref 0–1)
BILIRUB SERPL-MCNC: 0.8 MG/DL (ref 0.2–1)
BUN SERPL-MCNC: 15 MG/DL (ref 5–25)
CALCIUM SERPL-MCNC: 8.7 MG/DL (ref 8.3–10.1)
CHLORIDE SERPL-SCNC: 110 MMOL/L (ref 100–108)
CHOLEST SERPL-MCNC: 214 MG/DL (ref 50–200)
CO2 SERPL-SCNC: 27 MMOL/L (ref 21–32)
CREAT SERPL-MCNC: 0.96 MG/DL (ref 0.6–1.3)
EOSINOPHIL # BLD AUTO: 0.22 THOUSAND/ΜL (ref 0–0.61)
EOSINOPHIL NFR BLD AUTO: 5 % (ref 0–6)
ERYTHROCYTE [DISTWIDTH] IN BLOOD BY AUTOMATED COUNT: 13.1 % (ref 11.6–15.1)
GFR SERPL CREATININE-BSD FRML MDRD: 91 ML/MIN/1.73SQ M
GLUCOSE P FAST SERPL-MCNC: 110 MG/DL (ref 65–99)
HCT VFR BLD AUTO: 31.4 % (ref 36.5–49.3)
HDLC SERPL-MCNC: 56 MG/DL
HGB BLD-MCNC: 10.1 G/DL (ref 12–17)
IMM GRANULOCYTES # BLD AUTO: 0 THOUSAND/UL (ref 0–0.2)
IMM GRANULOCYTES NFR BLD AUTO: 0 % (ref 0–2)
LDLC SERPL CALC-MCNC: 119 MG/DL (ref 0–100)
LYMPHOCYTES # BLD AUTO: 1.85 THOUSANDS/ΜL (ref 0.6–4.47)
LYMPHOCYTES NFR BLD AUTO: 39 % (ref 14–44)
MCH RBC QN AUTO: 29.7 PG (ref 26.8–34.3)
MCHC RBC AUTO-ENTMCNC: 32.2 G/DL (ref 31.4–37.4)
MCV RBC AUTO: 92 FL (ref 82–98)
MONOCYTES # BLD AUTO: 0.53 THOUSAND/ΜL (ref 0.17–1.22)
MONOCYTES NFR BLD AUTO: 11 % (ref 4–12)
NEUTROPHILS # BLD AUTO: 2.12 THOUSANDS/ΜL (ref 1.85–7.62)
NEUTS SEG NFR BLD AUTO: 44 % (ref 43–75)
NONHDLC SERPL-MCNC: 158 MG/DL
NRBC BLD AUTO-RTO: 0 /100 WBCS
PLATELET # BLD AUTO: 314 THOUSANDS/UL (ref 149–390)
PMV BLD AUTO: 10.3 FL (ref 8.9–12.7)
POTASSIUM SERPL-SCNC: 4.2 MMOL/L (ref 3.5–5.3)
PROT SERPL-MCNC: 7 G/DL (ref 6.4–8.2)
PSA SERPL-MCNC: 0.8 NG/ML (ref 0–4)
RBC # BLD AUTO: 3.4 MILLION/UL (ref 3.88–5.62)
SODIUM SERPL-SCNC: 142 MMOL/L (ref 136–145)
TRIGL SERPL-MCNC: 196 MG/DL
WBC # BLD AUTO: 4.75 THOUSAND/UL (ref 4.31–10.16)

## 2021-05-03 PROCEDURE — G0103 PSA SCREENING: HCPCS

## 2021-05-03 PROCEDURE — 85025 COMPLETE CBC W/AUTO DIFF WBC: CPT

## 2021-05-03 PROCEDURE — 80053 COMPREHEN METABOLIC PANEL: CPT

## 2021-05-03 PROCEDURE — 36415 COLL VENOUS BLD VENIPUNCTURE: CPT

## 2021-05-03 PROCEDURE — 80061 LIPID PANEL: CPT

## 2021-05-10 ENCOUNTER — OFFICE VISIT (OUTPATIENT)
Dept: FAMILY MEDICINE CLINIC | Facility: CLINIC | Age: 51
End: 2021-05-10
Payer: COMMERCIAL

## 2021-05-10 VITALS
DIASTOLIC BLOOD PRESSURE: 86 MMHG | TEMPERATURE: 97.6 F | HEART RATE: 68 BPM | BODY MASS INDEX: 38.8 KG/M2 | HEIGHT: 70 IN | SYSTOLIC BLOOD PRESSURE: 124 MMHG | WEIGHT: 271 LBS | RESPIRATION RATE: 16 BRPM

## 2021-05-10 DIAGNOSIS — F41.9 ANXIETY: ICD-10-CM

## 2021-05-10 DIAGNOSIS — E83.51 HYPOCALCEMIA: ICD-10-CM

## 2021-05-10 DIAGNOSIS — Z12.5 PROSTATE CANCER SCREENING: ICD-10-CM

## 2021-05-10 DIAGNOSIS — R73.9 HYPERGLYCEMIA: ICD-10-CM

## 2021-05-10 DIAGNOSIS — E78.2 MIXED HYPERLIPIDEMIA: Primary | ICD-10-CM

## 2021-05-10 DIAGNOSIS — K92.1 HEMATOCHEZIA: ICD-10-CM

## 2021-05-10 PROCEDURE — 3008F BODY MASS INDEX DOCD: CPT | Performed by: FAMILY MEDICINE

## 2021-05-10 PROCEDURE — 1111F DSCHRG MED/CURRENT MED MERGE: CPT | Performed by: FAMILY MEDICINE

## 2021-05-10 PROCEDURE — 1036F TOBACCO NON-USER: CPT | Performed by: FAMILY MEDICINE

## 2021-05-10 PROCEDURE — 99214 OFFICE O/P EST MOD 30 MIN: CPT | Performed by: FAMILY MEDICINE

## 2021-05-10 NOTE — ASSESSMENT & PLAN NOTE
Cholesterol remains somewhat elevated, though he is on fairly high-dose atorvastatin  Reviewed atorvastatin dosing, i e  Can increase to 80 mg  Review changing to Crestor  Patient is already vegetarian  Fortunately, his HDL is excellent  He prefers to not make adjustments at the moment  Will check in 6 months

## 2021-05-10 NOTE — PATIENT INSTRUCTIONS
Problem List Items Addressed This Visit     Anxiety     Patient appears to be doing relatively well with anxiety  Hematochezia     Patient reports no further bleeding after the episode post polypectomy  Hemoglobin and hematocrit seem to be coming up  Would recommend recheck in several weeks to confirm coming back up towards normal, but no further bleeding suggest this will be the case  Relevant Orders    CBC and differential    Hyperglycemia     Patient does have high blood sugar still  Recommend limit carbohydrates, continue to check  Relevant Orders    Comprehensive metabolic panel    Hyperlipidemia - Primary     Cholesterol remains somewhat elevated, though he is on fairly high-dose atorvastatin  Reviewed atorvastatin dosing, i e  Can increase to 80 mg  Review changing to Crestor  Patient is already vegetarian  Fortunately, his HDL is excellent  He prefers to not make adjustments at the moment  Will check in 6 months  Relevant Orders    Comprehensive metabolic panel    Lipid panel    Hypocalcemia     Calcium was normal            Other Visit Diagnoses     Prostate cancer screening        PSA was good  Check in 1 year  No changes  EKATERINA defered  COVID 19 Instructions     Tana was advised to limit contact with others to essential tasks such as getting food, medications, and medical care  Proper handwashing reviewed, and Hand sanitzer when washing is not available  If the patient develops symptoms of COVID 19, the patient should call the office as soon as possible  For 4745-5167 Flu season, it is strongly recommended that Flu Vaccinations be obtained  Virtual Visits may be conducted in several ways: [place]: You should get a text message when the provider is ready to see you  Click on the link in the text message, and the call should start  You will need to type in your name, and allow camera and microphone access    This is HIPPA compliant, and secure  Please try to download Google Duo  Once you do download this on your phone, you will be prompted to add your phone number to the account  After that, he should receive a text from [employer], and use that code to verify your phone number  After that, you should be able to use Google Duo to receive and make video calls  Please download Microsoft Teams to your phone or computer  You would get an email with the meeting after scheduling with the office  You will Join the meeting, and wait there till the provider joins as well  Instructions for downloading this are available from the office  This is HIPPA compliant, and secure  We are committed to getting you vaccinated as soon as possible and will be closely following CDC and [facility]  guidelines as they are released and revised  Please refer to our COVID-19 vaccine webpage for the most up to date information on the vaccine and our distribution efforts      Zeenat moore

## 2021-05-10 NOTE — ASSESSMENT & PLAN NOTE
Patient reports no further bleeding after the episode post polypectomy  Hemoglobin and hematocrit seem to be coming up  Would recommend recheck in several weeks to confirm coming back up towards normal, but no further bleeding suggest this will be the case

## 2021-05-10 NOTE — PROGRESS NOTES
Assessment and Plan:    Problem List Items Addressed This Visit     Anxiety     Patient appears to be doing relatively well with anxiety  Hematochezia     Patient reports no further bleeding after the episode post polypectomy  Hemoglobin and hematocrit seem to be coming up  Would recommend recheck in several weeks to confirm coming back up towards normal, but no further bleeding suggest this will be the case  Relevant Orders    CBC and differential    Hyperglycemia     Patient does have high blood sugar still  Recommend limit carbohydrates, continue to check  Relevant Orders    Comprehensive metabolic panel    Hyperlipidemia - Primary     Cholesterol remains somewhat elevated, though he is on fairly high-dose atorvastatin  Reviewed atorvastatin dosing, i e  Can increase to 80 mg  Review changing to Crestor  Patient is already vegetarian  Fortunately, his HDL is excellent  He prefers to not make adjustments at the moment  Will check in 6 months  Relevant Orders    Comprehensive metabolic panel    Lipid panel    Hypocalcemia     Calcium was normal            Other Visit Diagnoses     Prostate cancer screening        PSA was good  Check in 1 year  No changes  EKATERINA defered  Diagnoses and all orders for this visit:    Mixed hyperlipidemia  -     Comprehensive metabolic panel; Future  -     Lipid panel; Future    Hyperglycemia  -     Comprehensive metabolic panel; Future    Anxiety    Hematochezia  -     CBC and differential; Future    Hypocalcemia    Prostate cancer screening  Comments:  PSA was good  Check in 1 year  No changes  EKATERINA defered  Subjective:      Patient ID: Efrain Best is a 46 y o  male  CC:    Chief Complaint   Patient presents with    Follow-up     Patient present today for a follow up on his lab results  HPI:    Patient is here for several issues  Cholesterol:  Using Lipitor, 40 mg    Denies any current problems or issues  Hyperglycemia:  Noted previously  Blood sugar reviewed  Patient did recently have colonoscopy, with bleeding after polypectomy  He was admitted for short time  Hemoglobin and hematocrit reviewed  Patient does have a history of PACs  Denies any current problems  Patient continues on Lexapro  Has not really had any problems in specific with it  The following portions of the patient's history were reviewed and updated as appropriate: allergies, current medications, past family history, past medical history, past social history, past surgical history and problem list       Review of Systems   Constitutional: Negative  HENT: Negative  Eyes: Negative  Respiratory: Negative  Cardiovascular: Negative  Gastrointestinal: Negative  Endocrine: Negative  Genitourinary: Negative  Musculoskeletal: Negative  Skin: Negative  Allergic/Immunologic: Negative  Neurological: Negative  Hematological: Negative  Psychiatric/Behavioral: Negative  Data to review: White count 4 75, hemoglobin 10 1, hematocrit 31 4, platelets 724  Total cholesterol 214, , HDL 56, triglycerides 196  Sodium 142, potassium 4 2, calcium 8 7  Blood sugar 110  AST 18, ALT 28  Creatinine 0 96, GFR 91  PSA 0 8  Objective:    Vitals:    05/10/21 0832   BP: 124/86   BP Location: Left arm   Patient Position: Sitting   Cuff Size: Large   Pulse: 68   Resp: 16   Temp: 97 6 °F (36 4 °C)   TempSrc: Tympanic   Weight: 123 kg (271 lb)   Height: 5' 10" (1 778 m)        Physical Exam  Vitals signs and nursing note reviewed  Constitutional:       Appearance: Normal appearance  Neck:      Vascular: No carotid bruit  Cardiovascular:      Rate and Rhythm: Normal rate and regular rhythm  Pulses: Normal pulses  Carotid pulses are 2+ on the right side and 2+ on the left side  Heart sounds: Normal heart sounds  No murmur  No gallop      Pulmonary:      Effort: Pulmonary effort is normal  No respiratory distress  Breath sounds: Normal breath sounds  No stridor  No wheezing, rhonchi or rales  Chest:      Chest wall: No tenderness  Neurological:      Mental Status: He is alert  BMI Counseling: Body mass index is 38 88 kg/m²  The BMI is above normal  Nutrition recommendations include decreasing portion sizes, encouraging healthy choices of fruits and vegetables, decreasing fast food intake, consuming healthier snacks, limiting drinks that contain sugar, moderation in carbohydrate intake, increasing intake of lean protein, reducing intake of saturated and trans fat and reducing intake of cholesterol  Exercise recommendations include exercising 3-5 times per week  No pharmacotherapy was ordered

## 2021-06-16 DIAGNOSIS — Z11.9 ENCOUNTER FOR SCREENING FOR INFECTIOUS AND PARASITIC DISEASES, UNSPECIFIED: Primary | ICD-10-CM

## 2021-06-16 PROCEDURE — U0003 INFECTIOUS AGENT DETECTION BY NUCLEIC ACID (DNA OR RNA); SEVERE ACUTE RESPIRATORY SYNDROME CORONAVIRUS 2 (SARS-COV-2) (CORONAVIRUS DISEASE [COVID-19]), AMPLIFIED PROBE TECHNIQUE, MAKING USE OF HIGH THROUGHPUT TECHNOLOGIES AS DESCRIBED BY CMS-2020-01-R: HCPCS | Performed by: FAMILY MEDICINE

## 2021-06-16 PROCEDURE — U0005 INFEC AGEN DETEC AMPLI PROBE: HCPCS | Performed by: FAMILY MEDICINE

## 2021-06-17 LAB — SARS-COV-2 RNA RESP QL NAA+PROBE: NEGATIVE

## 2021-10-23 ENCOUNTER — IMMUNIZATIONS (OUTPATIENT)
Dept: FAMILY MEDICINE CLINIC | Facility: MEDICAL CENTER | Age: 51
End: 2021-10-23

## 2021-10-23 DIAGNOSIS — Z23 ENCOUNTER FOR IMMUNIZATION: Primary | ICD-10-CM

## 2021-10-23 PROCEDURE — 91300 SARSCOV2 VAC 30MCG/0.3ML IM: CPT

## 2021-11-16 ENCOUNTER — APPOINTMENT (OUTPATIENT)
Dept: LAB | Facility: MEDICAL CENTER | Age: 51
End: 2021-11-16
Payer: COMMERCIAL

## 2021-11-16 DIAGNOSIS — E78.2 MIXED HYPERLIPIDEMIA: ICD-10-CM

## 2021-11-16 DIAGNOSIS — R73.9 HYPERGLYCEMIA: ICD-10-CM

## 2021-11-16 DIAGNOSIS — Z12.5 PROSTATE CANCER SCREENING: ICD-10-CM

## 2021-11-16 LAB
ALBUMIN SERPL BCP-MCNC: 3.6 G/DL (ref 3.5–5)
ALP SERPL-CCNC: 55 U/L (ref 46–116)
ALT SERPL W P-5'-P-CCNC: 27 U/L (ref 12–78)
ANION GAP SERPL CALCULATED.3IONS-SCNC: 6 MMOL/L (ref 4–13)
AST SERPL W P-5'-P-CCNC: 21 U/L (ref 5–45)
BILIRUB SERPL-MCNC: 0.78 MG/DL (ref 0.2–1)
BUN SERPL-MCNC: 18 MG/DL (ref 5–25)
CALCIUM SERPL-MCNC: 9 MG/DL (ref 8.3–10.1)
CHLORIDE SERPL-SCNC: 107 MMOL/L (ref 100–108)
CHOLEST SERPL-MCNC: 355 MG/DL (ref 50–200)
CO2 SERPL-SCNC: 23 MMOL/L (ref 21–32)
CREAT SERPL-MCNC: 1.07 MG/DL (ref 0.6–1.3)
GFR SERPL CREATININE-BSD FRML MDRD: 80 ML/MIN/1.73SQ M
GLUCOSE P FAST SERPL-MCNC: 119 MG/DL (ref 65–99)
HDLC SERPL-MCNC: 48 MG/DL
LDLC SERPL CALC-MCNC: 256 MG/DL (ref 0–100)
NONHDLC SERPL-MCNC: 307 MG/DL
POTASSIUM SERPL-SCNC: 5.5 MMOL/L (ref 3.5–5.3)
PROT SERPL-MCNC: 7.6 G/DL (ref 6.4–8.2)
SODIUM SERPL-SCNC: 136 MMOL/L (ref 136–145)
TRIGL SERPL-MCNC: 255 MG/DL

## 2021-11-16 PROCEDURE — 80053 COMPREHEN METABOLIC PANEL: CPT

## 2021-11-16 PROCEDURE — 80061 LIPID PANEL: CPT

## 2021-11-16 PROCEDURE — 36415 COLL VENOUS BLD VENIPUNCTURE: CPT

## 2021-11-24 ENCOUNTER — OFFICE VISIT (OUTPATIENT)
Dept: FAMILY MEDICINE CLINIC | Facility: CLINIC | Age: 51
End: 2021-11-24
Payer: COMMERCIAL

## 2021-11-24 ENCOUNTER — LAB (OUTPATIENT)
Dept: LAB | Facility: CLINIC | Age: 51
End: 2021-11-24
Payer: COMMERCIAL

## 2021-11-24 VITALS
HEART RATE: 76 BPM | HEIGHT: 70 IN | SYSTOLIC BLOOD PRESSURE: 108 MMHG | BODY MASS INDEX: 40.09 KG/M2 | WEIGHT: 280 LBS | DIASTOLIC BLOOD PRESSURE: 78 MMHG

## 2021-11-24 DIAGNOSIS — E87.5 HYPERKALEMIA: ICD-10-CM

## 2021-11-24 DIAGNOSIS — E78.2 MIXED HYPERLIPIDEMIA: Primary | ICD-10-CM

## 2021-11-24 DIAGNOSIS — R73.9 HYPERGLYCEMIA: ICD-10-CM

## 2021-11-24 DIAGNOSIS — I49.1 PAC (PREMATURE ATRIAL CONTRACTION): ICD-10-CM

## 2021-11-24 DIAGNOSIS — F41.9 ANXIETY: ICD-10-CM

## 2021-11-24 PROBLEM — K92.1 HEMATOCHEZIA: Status: RESOLVED | Noted: 2021-04-14 | Resolved: 2021-11-24

## 2021-11-24 LAB
ANION GAP SERPL CALCULATED.3IONS-SCNC: 6 MMOL/L (ref 4–13)
BUN SERPL-MCNC: 20 MG/DL (ref 5–25)
CALCIUM SERPL-MCNC: 9 MG/DL (ref 8.3–10.1)
CHLORIDE SERPL-SCNC: 108 MMOL/L (ref 100–108)
CO2 SERPL-SCNC: 24 MMOL/L (ref 21–32)
CREAT SERPL-MCNC: 0.95 MG/DL (ref 0.6–1.3)
GFR SERPL CREATININE-BSD FRML MDRD: 92 ML/MIN/1.73SQ M
GLUCOSE SERPL-MCNC: 104 MG/DL (ref 65–140)
POTASSIUM SERPL-SCNC: 4.2 MMOL/L (ref 3.5–5.3)
SODIUM SERPL-SCNC: 138 MMOL/L (ref 136–145)

## 2021-11-24 PROCEDURE — 36415 COLL VENOUS BLD VENIPUNCTURE: CPT

## 2021-11-24 PROCEDURE — 3725F SCREEN DEPRESSION PERFORMED: CPT | Performed by: FAMILY MEDICINE

## 2021-11-24 PROCEDURE — 80048 BASIC METABOLIC PNL TOTAL CA: CPT

## 2021-11-24 PROCEDURE — 99214 OFFICE O/P EST MOD 30 MIN: CPT | Performed by: FAMILY MEDICINE

## 2021-11-24 PROCEDURE — 3008F BODY MASS INDEX DOCD: CPT | Performed by: FAMILY MEDICINE

## 2021-11-24 PROCEDURE — 1036F TOBACCO NON-USER: CPT | Performed by: FAMILY MEDICINE

## 2021-11-24 RX ORDER — ROSUVASTATIN CALCIUM 10 MG/1
10 TABLET, COATED ORAL DAILY
Qty: 90 TABLET | Refills: 3 | Status: SHIPPED | OUTPATIENT
Start: 2021-11-24 | End: 2021-12-19 | Stop reason: SDUPTHER

## 2021-12-19 DIAGNOSIS — E78.2 MIXED HYPERLIPIDEMIA: ICD-10-CM

## 2021-12-19 RX ORDER — ROSUVASTATIN CALCIUM 10 MG/1
10 TABLET, COATED ORAL DAILY
Qty: 90 TABLET | Refills: 3 | Status: SHIPPED | OUTPATIENT
Start: 2021-12-19

## 2021-12-30 ENCOUNTER — CLINICAL SUPPORT (OUTPATIENT)
Dept: FAMILY MEDICINE CLINIC | Facility: CLINIC | Age: 51
End: 2021-12-30

## 2021-12-30 ENCOUNTER — TELEPHONE (OUTPATIENT)
Dept: FAMILY MEDICINE CLINIC | Facility: CLINIC | Age: 51
End: 2021-12-30

## 2021-12-30 DIAGNOSIS — B34.9 VIRAL ILLNESS: Primary | ICD-10-CM

## 2021-12-30 DIAGNOSIS — Z20.822 EXPOSURE TO COVID-19 VIRUS: Primary | ICD-10-CM

## 2021-12-30 PROCEDURE — U0003 INFECTIOUS AGENT DETECTION BY NUCLEIC ACID (DNA OR RNA); SEVERE ACUTE RESPIRATORY SYNDROME CORONAVIRUS 2 (SARS-COV-2) (CORONAVIRUS DISEASE [COVID-19]), AMPLIFIED PROBE TECHNIQUE, MAKING USE OF HIGH THROUGHPUT TECHNOLOGIES AS DESCRIBED BY CMS-2020-01-R: HCPCS | Performed by: FAMILY MEDICINE

## 2022-01-02 LAB — SARS-COV-2 RNA RESP QL NAA+PROBE: NEGATIVE

## 2022-05-26 ENCOUNTER — IMMUNIZATIONS (OUTPATIENT)
Dept: FAMILY MEDICINE CLINIC | Facility: CLINIC | Age: 52
End: 2022-05-26
Payer: COMMERCIAL

## 2022-05-26 DIAGNOSIS — Z23 COVID-19 VACCINE ADMINISTERED: Primary | ICD-10-CM

## 2022-05-26 PROCEDURE — 0054A PR IMM ADMN SARSCOV2 30MCG/0.3ML TRIS-SUCROSE BST: CPT

## 2022-05-26 PROCEDURE — 91305 PR SARSCOV2 VACCINE 30MCG/0.3ML TRIS-SUCROSE IM USE: CPT

## 2022-06-11 ENCOUNTER — APPOINTMENT (OUTPATIENT)
Dept: LAB | Facility: MEDICAL CENTER | Age: 52
End: 2022-06-11
Payer: COMMERCIAL

## 2022-06-11 DIAGNOSIS — R73.9 HYPERGLYCEMIA: ICD-10-CM

## 2022-06-11 DIAGNOSIS — E78.2 MIXED HYPERLIPIDEMIA: ICD-10-CM

## 2022-06-11 DIAGNOSIS — E87.5 HYPERKALEMIA: ICD-10-CM

## 2022-06-11 LAB
ALBUMIN SERPL BCP-MCNC: 3.7 G/DL (ref 3.5–5)
ALP SERPL-CCNC: 44 U/L (ref 46–116)
ALT SERPL W P-5'-P-CCNC: 36 U/L (ref 12–78)
ANION GAP SERPL CALCULATED.3IONS-SCNC: 1 MMOL/L (ref 4–13)
AST SERPL W P-5'-P-CCNC: 24 U/L (ref 5–45)
BILIRUB SERPL-MCNC: 0.4 MG/DL (ref 0.2–1)
BUN SERPL-MCNC: 19 MG/DL (ref 5–25)
CALCIUM SERPL-MCNC: 9.4 MG/DL (ref 8.3–10.1)
CHLORIDE SERPL-SCNC: 108 MMOL/L (ref 100–108)
CHOLEST SERPL-MCNC: 178 MG/DL
CO2 SERPL-SCNC: 29 MMOL/L (ref 21–32)
CREAT SERPL-MCNC: 1.05 MG/DL (ref 0.6–1.3)
EST. AVERAGE GLUCOSE BLD GHB EST-MCNC: 111 MG/DL
GFR SERPL CREATININE-BSD FRML MDRD: 81 ML/MIN/1.73SQ M
GLUCOSE P FAST SERPL-MCNC: 110 MG/DL (ref 65–99)
HBA1C MFR BLD: 5.5 %
HDLC SERPL-MCNC: 58 MG/DL
LDLC SERPL CALC-MCNC: 99 MG/DL (ref 0–100)
NONHDLC SERPL-MCNC: 120 MG/DL
POTASSIUM SERPL-SCNC: 4.5 MMOL/L (ref 3.5–5.3)
PROT SERPL-MCNC: 7.2 G/DL (ref 6.4–8.2)
PSA SERPL-MCNC: 1.2 NG/ML (ref 0–4)
SODIUM SERPL-SCNC: 138 MMOL/L (ref 136–145)
TRIGL SERPL-MCNC: 106 MG/DL

## 2022-06-11 PROCEDURE — 80061 LIPID PANEL: CPT

## 2022-06-11 PROCEDURE — 83036 HEMOGLOBIN GLYCOSYLATED A1C: CPT

## 2022-06-11 PROCEDURE — 80053 COMPREHEN METABOLIC PANEL: CPT

## 2022-06-11 PROCEDURE — 36415 COLL VENOUS BLD VENIPUNCTURE: CPT

## 2022-06-11 PROCEDURE — G0103 PSA SCREENING: HCPCS

## 2022-06-27 ENCOUNTER — OFFICE VISIT (OUTPATIENT)
Dept: FAMILY MEDICINE CLINIC | Facility: CLINIC | Age: 52
End: 2022-06-27
Payer: COMMERCIAL

## 2022-06-27 VITALS
HEIGHT: 70 IN | SYSTOLIC BLOOD PRESSURE: 104 MMHG | DIASTOLIC BLOOD PRESSURE: 76 MMHG | BODY MASS INDEX: 35.29 KG/M2 | HEART RATE: 68 BPM | WEIGHT: 246.5 LBS | TEMPERATURE: 97.4 F | OXYGEN SATURATION: 97 %

## 2022-06-27 DIAGNOSIS — F41.9 ANXIETY: ICD-10-CM

## 2022-06-27 DIAGNOSIS — E78.2 MIXED HYPERLIPIDEMIA: Primary | ICD-10-CM

## 2022-06-27 DIAGNOSIS — R73.9 HYPERGLYCEMIA: ICD-10-CM

## 2022-06-27 DIAGNOSIS — Z12.5 PROSTATE CANCER SCREENING: ICD-10-CM

## 2022-06-27 PROCEDURE — 3725F SCREEN DEPRESSION PERFORMED: CPT | Performed by: FAMILY MEDICINE

## 2022-06-27 PROCEDURE — 1036F TOBACCO NON-USER: CPT | Performed by: FAMILY MEDICINE

## 2022-06-27 PROCEDURE — 99214 OFFICE O/P EST MOD 30 MIN: CPT | Performed by: FAMILY MEDICINE

## 2022-06-27 PROCEDURE — 3008F BODY MASS INDEX DOCD: CPT | Performed by: FAMILY MEDICINE

## 2022-06-27 NOTE — PROGRESS NOTES
Assessment and Plan:    Problem List Items Addressed This Visit     Anxiety     Patient does have some anxiety symptoms in the past, but has been doing quite well at this point  He feels he is doing well enough  Continues on Lexapro 20  Recommend he speak with his significant other about whether not it is making a difference  Hyperglycemia     Blood sugar was improved versus prior  Relevant Orders    Comprehensive metabolic panel    Hyperlipidemia - Primary     Stable  Cholesterol is much improved on Crestor 10  Check in 6 months  Relevant Orders    Comprehensive metabolic panel    Lipid panel      Other Visit Diagnoses     Prostate cancer screening        EKATERINA negative  PSA minimally raised versus last time  Check in 1 year  Relevant Orders    PSA, Total Screen                 Diagnoses and all orders for this visit:    Mixed hyperlipidemia  -     Comprehensive metabolic panel; Future  -     Lipid panel; Future    Hyperglycemia  -     Comprehensive metabolic panel; Future    Anxiety    Prostate cancer screening  Comments:  EKATERINA negative  PSA minimally raised versus last time  Check in 1 year  Orders:  -     PSA, Total Screen; Future            Subjective:      Patient ID: Doyle Golden is a 46 y o  male  CC:    Chief Complaint   Patient presents with    Follow-up     For chronic conditions, discuss lab results  mgb       HPI:    Is here today to follow-up on several issues  Currently on Crestor, 10 mg  Denies any issues with this  Anxiety:  Lexapro 20  Denies any particular issues  Hyperglycemia: Stable  No changes  Prostate cancer screen: PSA reviewed  No urinary symptoms          The following portions of the patient's history were reviewed and updated as appropriate: allergies, current medications, past family history, past medical history, past social history, past surgical history and problem list       Review of Systems   Constitutional: Negative  HENT: Negative  Eyes: Negative  Respiratory: Negative  Cardiovascular: Negative  Gastrointestinal: Negative  Endocrine: Negative  Genitourinary: Negative  Musculoskeletal: Negative  Skin: Negative  Allergic/Immunologic: Negative  Neurological: Negative  Hematological: Negative  Psychiatric/Behavioral: Negative  Data to review:   Sodium 138, potassium 4 5, calcium 9 4  Blood sugar 110  Creatinine 1 05, GFR 81  AST 24, ALT 36  Total cholesterol 178, LDL 99, HDL 58, triglycerides 106  A1c 5 5  PSA 1 2, last was 0 8  Objective:    Vitals:    06/27/22 0828   BP: 104/76   BP Location: Left arm   Patient Position: Sitting   Cuff Size: Large   Pulse: 68   Temp: (!) 97 4 °F (36 3 °C)   TempSrc: Temporal   SpO2: 97%   Weight: 112 kg (246 lb 8 oz)   Height: 5' 10" (1 778 m)        Physical Exam  Vitals and nursing note reviewed  Constitutional:       Appearance: Normal appearance  Neck:      Vascular: No carotid bruit  Cardiovascular:      Rate and Rhythm: Normal rate and regular rhythm  Pulses: Normal pulses  Carotid pulses are 2+ on the right side and 2+ on the left side  Heart sounds: Normal heart sounds  No murmur heard  No gallop  Pulmonary:      Effort: Pulmonary effort is normal  No respiratory distress  Breath sounds: Normal breath sounds  No stridor  No wheezing, rhonchi or rales  Chest:      Chest wall: No tenderness  Genitourinary:     Prostate: Normal  Not enlarged, not tender and no nodules present  Rectum: Normal  Guaiac result negative  No mass, tenderness, anal fissure, external hemorrhoid or internal hemorrhoid  Normal anal tone  Neurological:      Mental Status: He is alert

## 2022-06-27 NOTE — ASSESSMENT & PLAN NOTE
Patient does have some anxiety symptoms in the past, but has been doing quite well at this point  He feels he is doing well enough  Continues on Lexapro 20  Recommend he speak with his significant other about whether not it is making a difference

## 2022-06-27 NOTE — PATIENT INSTRUCTIONS
Problem List Items Addressed This Visit       Anxiety     Patient does have some anxiety symptoms in the past, but has been doing quite well at this point  He feels he is doing well enough  Continues on Lexapro 20  Recommend he speak with his significant other about whether not it is making a difference  Hyperglycemia     Blood sugar was improved versus prior  Relevant Orders    Comprehensive metabolic panel    Hyperlipidemia - Primary     Stable  Cholesterol is much improved on Crestor 10  Check in 6 months  Relevant Orders    Comprehensive metabolic panel    Lipid panel          Other Visit Diagnoses       Prostate cancer screening        EKATERINA negative  PSA minimally raised versus last time  Check in 1 year  Relevant Orders    PSA, Total Screen            COVID 19 Instructions    Kilo Colbert was advised to limit contact with others to essential tasks such as getting food, medications, and medical care  Proper handwashing reviewed, and Hand sanitzer when washing is not available  If the patient develops symptoms of COVID 19, the patient should call the office as soon as possible  For 5543-3719 Flu season, it is strongly recommended that Flu Vaccinations be obtained  Virtual Visits:  Mojgan: This works on smart phones (any phone with Internet browsing capability)  You should get a text message when the provider is ready to see you  Click on the link in the text message, and the call should start  You will need to type in your name, and allow camera and microphone access  This is HIPPA compliant, and secure  If you have not already done so, get immunized to COVID 19  We are committed to getting you vaccinated as soon as possible and will be closely following CDC and [facility]  guidelines as they are released and revised    Please refer to our COVID-19 vaccine webpage for the most up to date information on the vaccine and our distribution efforts  This site will also have the most up to date recommendations for COVID booster vaccine  Zeenat moore    Call [phone] ([phone]), option 7    OUR NEW LOCATION:    [address], [address] [place], [place] [address], [place], [address]  Fax: [phone]    Lab services and OB/GYN are at this location as well

## 2022-10-06 DIAGNOSIS — M25.462 PAIN AND SWELLING OF LEFT KNEE: Primary | ICD-10-CM

## 2022-10-06 DIAGNOSIS — M25.562 PAIN AND SWELLING OF LEFT KNEE: Primary | ICD-10-CM

## 2022-10-07 ENCOUNTER — APPOINTMENT (OUTPATIENT)
Dept: RADIOLOGY | Facility: MEDICAL CENTER | Age: 52
End: 2022-10-07
Payer: COMMERCIAL

## 2022-10-07 DIAGNOSIS — M25.562 PAIN AND SWELLING OF LEFT KNEE: ICD-10-CM

## 2022-10-07 DIAGNOSIS — M25.462 PAIN AND SWELLING OF LEFT KNEE: ICD-10-CM

## 2022-10-07 PROCEDURE — 73564 X-RAY EXAM KNEE 4 OR MORE: CPT

## 2022-10-12 DIAGNOSIS — M25.462 PAIN AND SWELLING OF LEFT KNEE: Primary | ICD-10-CM

## 2022-10-12 DIAGNOSIS — M25.562 PAIN AND SWELLING OF LEFT KNEE: Primary | ICD-10-CM

## 2022-10-13 DIAGNOSIS — E78.2 MIXED HYPERLIPIDEMIA: ICD-10-CM

## 2022-10-13 DIAGNOSIS — Z00.00 HEALTHCARE MAINTENANCE: Primary | ICD-10-CM

## 2022-10-13 RX ORDER — MULTIVITAMIN
1 TABLET ORAL DAILY
Qty: 30 TABLET | Refills: 11 | Status: CANCELLED | OUTPATIENT
Start: 2022-10-13

## 2022-10-13 RX ORDER — MULTIVITAMIN
1 TABLET ORAL DAILY
Qty: 30 TABLET | Refills: 11 | Status: SHIPPED | OUTPATIENT
Start: 2022-10-13

## 2022-10-26 ENCOUNTER — RA CDI HCC (OUTPATIENT)
Dept: OTHER | Facility: HOSPITAL | Age: 52
End: 2022-10-26

## 2022-10-26 NOTE — PROGRESS NOTES
[facility]  coding opportunities       Chart reviewed, no opportunity found: CHART REVIEWED, NO OPPORTUNITY FOUND        Patients Insurance        Commercial Insurance: [address] [place] [address]

## 2022-10-27 ENCOUNTER — IMMUNIZATIONS (OUTPATIENT)
Dept: FAMILY MEDICINE CLINIC | Facility: CLINIC | Age: 52
End: 2022-10-27
Payer: COMMERCIAL

## 2022-10-27 DIAGNOSIS — Z23 NEED FOR COVID-19 VACCINE: Primary | ICD-10-CM

## 2022-10-27 PROCEDURE — 0124A ADM SARSCV2 BVL 30MCG/.3ML B: CPT

## 2022-10-27 PROCEDURE — 91312 SARSCOV2 VAC BVL 30MCG/0.3ML: CPT

## 2022-11-01 ENCOUNTER — TELEPHONE (OUTPATIENT)
Dept: FAMILY MEDICINE CLINIC | Facility: CLINIC | Age: 52
End: 2022-11-01

## 2022-11-01 NOTE — TELEPHONE ENCOUNTER
Pt  Called provider on 10/6/2022 with c/o ongoing left knee pain for over one month  He has recently started walking 1-2 hours a day for the past several months for weight loss and then started jogging  (Pt with history of running marathons in the past ) Pain started a couple weeks after this and was to the point the pt was having a difficult time weight bearing while walking due to pain and at times knee was givng out  Very difficult to sleep as pain and pressure would keep him awake  Pain located to the inner left knee with swelling  Multiple OTC meds tried such as ibuprofen, aleve w/o improvement of pain or swelling  Pt  has obviously suffered an acute injury to his left knee and so xray was ordered for pt  It is therefore recommended for him to see an ortho specialist for further management and appropriate treatment regardless of results

## 2022-11-02 ENCOUNTER — OFFICE VISIT (OUTPATIENT)
Dept: OBGYN CLINIC | Facility: MEDICAL CENTER | Age: 52
End: 2022-11-02

## 2022-11-02 ENCOUNTER — APPOINTMENT (OUTPATIENT)
Dept: RADIOLOGY | Facility: MEDICAL CENTER | Age: 52
End: 2022-11-02

## 2022-11-02 VITALS
HEIGHT: 70 IN | DIASTOLIC BLOOD PRESSURE: 81 MMHG | SYSTOLIC BLOOD PRESSURE: 134 MMHG | WEIGHT: 222.4 LBS | HEART RATE: 69 BPM | BODY MASS INDEX: 31.84 KG/M2

## 2022-11-02 DIAGNOSIS — M25.562 PAIN AND SWELLING OF LEFT KNEE: ICD-10-CM

## 2022-11-02 DIAGNOSIS — M25.462 PAIN AND SWELLING OF LEFT KNEE: ICD-10-CM

## 2022-11-02 DIAGNOSIS — M23.92 INTERNAL DERANGEMENT OF LEFT KNEE: Primary | ICD-10-CM

## 2022-11-02 DIAGNOSIS — Z01.89 ENCOUNTER FOR LOWER EXTREMITY COMPARISON IMAGING STUDY: ICD-10-CM

## 2022-11-02 DIAGNOSIS — M17.12 PATELLOFEMORAL ARTHRITIS OF LEFT KNEE: ICD-10-CM

## 2022-11-02 DIAGNOSIS — M17.12 PRIMARY OSTEOARTHRITIS OF LEFT KNEE: ICD-10-CM
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
## 2022-11-05 DIAGNOSIS — B34.9 VIRAL ILLNESS: Primary | ICD-10-CM

## 2022-11-14 ENCOUNTER — HOSPITAL ENCOUNTER (OUTPATIENT)
Dept: MRI IMAGING | Facility: HOSPITAL | Age: 52
Discharge: HOME | End: 2022-11-14
Attending: ORTHOPAEDIC SURGERY

## 2022-11-14 DIAGNOSIS — M23.92 INTERNAL DERANGEMENT OF LEFT KNEE: ICD-10-CM

## 2022-11-28 DIAGNOSIS — E78.2 MIXED HYPERLIPIDEMIA: ICD-10-CM

## 2022-11-28 RX ORDER — ROSUVASTATIN CALCIUM 10 MG/1
TABLET, COATED ORAL
Qty: 90 TABLET | Refills: 3 | Status: SHIPPED | OUTPATIENT
Start: 2022-11-28

## 2022-11-29 ENCOUNTER — OFFICE VISIT (OUTPATIENT)
Dept: OBGYN CLINIC | Facility: MEDICAL CENTER | Age: 52
End: 2022-11-29

## 2022-11-29 VITALS
HEART RATE: 64 BPM | DIASTOLIC BLOOD PRESSURE: 77 MMHG | SYSTOLIC BLOOD PRESSURE: 121 MMHG | BODY MASS INDEX: 31.3 KG/M2 | WEIGHT: 218.6 LBS | HEIGHT: 70 IN

## 2022-11-29 DIAGNOSIS — S83.242A OTHER TEAR OF MEDIAL MENISCUS, CURRENT INJURY, LEFT KNEE, INITIAL ENCOUNTER: Primary | ICD-10-CM

## 2022-11-29 DIAGNOSIS — M17.12 PRIMARY OSTEOARTHRITIS OF LEFT KNEE: ICD-10-CM

## 2022-11-29 RX ORDER — DICLOFENAC SODIUM 75 MG/1
75 TABLET, DELAYED RELEASE ORAL 2 TIMES DAILY PRN
Qty: 60 TABLET | Refills: 2 | Status: SHIPPED | OUTPATIENT
Start: 2022-11-29
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
## 2022-12-06 ENCOUNTER — EVALUATION (OUTPATIENT)
Dept: PHYSICAL THERAPY | Facility: CLINIC | Age: 52
End: 2022-12-06

## 2022-12-06 DIAGNOSIS — S83.242A OTHER TEAR OF MEDIAL MENISCUS, CURRENT INJURY, LEFT KNEE, INITIAL ENCOUNTER: ICD-10-CM

## 2022-12-06 DIAGNOSIS — M17.12 PRIMARY OSTEOARTHRITIS OF LEFT KNEE: ICD-10-CM

## 2022-12-06 NOTE — PROGRESS NOTES
PT Evaluation     Today's date: 2022  Patient name: Ailyn Jin  : 1970  MRN: [id number]  Referring provider: Lianet Sparrow  Dx:   Encounter Diagnosis     ICD-10-CM    1  Primary osteoarthritis of left knee  M17 12 Ambulatory Referral to Physical Therapy      2  Other tear of medial meniscus, current injury, left knee, initial encounter  S83 242A Ambulatory Referral to Physical Therapy          Start Time: 0816  Stop Time: 0900  Total time in clinic (min): 44 minutes    Assessment  Assessment details: Pt is a 46y o  year old male that presents to outpatient physical therapy with L knee pain  Recent MRI findings state a complex tear posterior horn of the medial meniscus extending to the junction with the body with radial and horizontal components  Pt demonstrated decreased strength with proximal muscular; specifically the hip adductors and abductors  Pt also demonstrates limited knee control, genu valgus, with single leg squat  Pt also exhibits decrease quad strength on the L vs the R with supine straight leg raise  Pt demonstrates increased pain, decreased ROM, decreased strength, decreased activity tolerance, and decreased functional activity due to pain and weakness  Pt appears motivated; HEP was given to Pt  Pt would benefit from skilled physical therapy to address noted impairments, meet patient's goals, and to return to PLOF  Thank you for this referral     Impairments: abnormal gait, abnormal or restricted ROM, activity intolerance, impaired balance, impaired physical strength, lacks appropriate home exercise program and pain with function    Symptom irritability: lowUnderstanding of Dx/Px/POC: good   Prognosis: fair    Goals  STGs:   1  Pt will be able to demonstrate an increase of strength by at least 1/2 grade within 4 weeks   2  Pt will be able to achieve increased ROM by at least 25% without increase of symptoms within 4 weeks     3  Pt will be able to running independently for at least 20 minutes at least 5 0 MPH without increase of symptoms during or following activity within 4 weeks        LTGs:   1  Pt will be independent with all IADLs without pain or discomfort upon discharge  2  Pt will be independent with HEP upon discharge   3  Pt will be able to report no pain or discomfort with all work duties and recreational activities for a full day upon discharge  4  Pt will be able to demonstrate at least 10 cm improvement with Y-balance on the L LE to improve SL balance without increase of symptoms upon discharge    Plan  Plan details: 1-2x a week   8 weeks   Patient would benefit from: PT eval and skilled physical therapy  Planned modality interventions: cryotherapy, electrical stimulation/Russian stimulation, TENS, low level laser therapy, thermotherapy: hydrocollator packs, iontophoresis and unattended electrical stimulation  Planned therapy interventions: abdominal trunk stabilization, joint mobilization, manual therapy, massage, Issa taping, muscle pump exercises, neuromuscular re-education, patient education, therapeutic activities, therapeutic exercise, stretching, strengthening, home exercise program, balance, functional ROM exercises, flexibility, breathing training and activity modification  Treatment plan discussed with: patient        Subjective Evaluation    History of Present Illness  Mechanism of injury: Pt states that he has been have pain/discomfort for the past few months  He states he had a twisting injury while walking beginning of August   He states the pain is getting better since then but still having discomfort  He states he is having a constant ache pain over the anteromedial aspect of the left knee  He does feel his knee is unstable at times and stiffness up walking a day of walking  Pain is worse with transitional position, steps and has trouble sleeping at night        Denies falling; denies numbness and tingling; denies clicking and locking    AGGS: running,   EASES: resting, laying down   Goals: would like to get back to running pain-free      Imaging findings: MRI - Complex tear posterior horn of the medial meniscus extending to the junction with the body with radial and horizontal components  Small baker's cyst  Pain  Current pain ratin  At best pain ratin  At worst pain ratin  Quality: dull ache, sharp and tight    Patient Goals  Patient goals for therapy: decreased pain and return to sport/leisure activities          Objective     Palpation     Additional Palpation Details  No tenderness with palpation     Neurological Testing     Sensation     Lumbar   Left   Intact: light touch    Right   Intact: light touch    Reflexes   Left   Clonus sign: negative    Right   Clonus sign: negative    Active Range of Motion     Lumbar   Normal active range of motion    Passive Range of Motion   Left Knee   Flexion: WFL and with pain  Extension: -2 degrees with pain    Strength/Myotome Testing     Left Hip   Planes of Motion   Flexion: 4  Extension: 4+  Abduction: 4  Adduction: 4-    Right Hip   Planes of Motion   Flexion: 5  Extension: 5  Abduction: 4+  Adduction: 4+    Left Knee   Flexion: 4  Extension: 4  Quadriceps contraction: fair    Right Knee   Flexion: 4+  Extension: 4+  Quadriceps contraction: good    Left Ankle/Foot   Dorsiflexion: 5  Plantar flexion: 5    Right Ankle/Foot   Dorsiflexion: 5  Plantar flexion: 5    Tests     Left Knee   Positive lateral Monroe, medial Monroe, Thessaly's test at 5 degrees and Thessaly's test at 20 degrees  Negative anterior Lachman, posterior Lachman, valgus stress test at 30 degrees and varus stress test at 30 degrees       Additional Tests Details  Single leg squat - noted genu valgus on L with reports of pain   - unable to stand from chair (B) with single leg     Y-balance:    - Right on ground        * fwd - 70       * left - 83        * right - 84    - Left on ground        * fwd - 63        * right - 76 * left - 74    Functional squat: able to complete with good form, reports mild pain in L knee     General Comments:      Knee Comments  Gait on treadmill: Pt ambulates with decreased stride length on the L  Pt exhibits poor TKE on the L side with initial heel strike with reports of pain in knee throughout entire stance phase on the L  An antalgic gait pattern            Precautions: Per MD note - Patient NOT to take with other NSAIDs or oral steroids  Daily Treatment Diary    Date 12/6            FOTO IE -             Re-Eval IE               Manuals    Knee PROM                                                    Neuro Re-Ed     NMES with SAQ set             BFR?                                                                               Ther Ex    Elliptical vs bike for ROM and CV endurance             Supine SLR 10x ea             Seated LAQ 10x ea            Side lying hip abd 10x (L)            Side stepping with TB Pink TB 2x10                                                   Ther Activity                              Gait Training                              Modalities

## 2022-12-14 ENCOUNTER — OFFICE VISIT (OUTPATIENT)
Dept: PHYSICAL THERAPY | Facility: CLINIC | Age: 52
End: 2022-12-14

## 2022-12-14 DIAGNOSIS — M17.12 PRIMARY OSTEOARTHRITIS OF LEFT KNEE: Primary | ICD-10-CM

## 2022-12-14 DIAGNOSIS — S83.242A OTHER TEAR OF MEDIAL MENISCUS, CURRENT INJURY, LEFT KNEE, INITIAL ENCOUNTER: ICD-10-CM

## 2022-12-14 NOTE — PROGRESS NOTES
Daily Note     Today's date: 2022  Patient name: Emmett Gibson  : 1970  MRN: [id number]  Referring provider: Valdo Henson  Dx:   Encounter Diagnosis     ICD-10-CM    1  Primary osteoarthritis of left knee  M17 12       2  Other tear of medial meniscus, current injury, left knee, initial encounter  S40 705O                      Subjective: Pt states that he feels about the same since the initial evaluation  States that his knee has been feeling pretty good when non WBing but has pain when walking or running after a period of time  Reports occasional locking of the knee over the last week  Objective: See treatment diary below      Assessment: Tolerated treatment well  Pt tolerated LE strengthening activities to improve activity tolerance  Denied pain or locking throughout session  Additional activities were reviewed and given to HEP  Patient demonstrated fatigue post treatment and would benefit from continued PT      Plan: Continue per plan of care  Will progress strengthening activities using blood flow restriction  Precautions: Per MD note - Patient NOT to take with other NSAIDs or oral steroids  Daily Treatment Diary    Date            FOTO IE -             Re-Eval IE               Manuals    Knee PROM             LLL  meniscus injury protocol                                      Neuro Re-Ed     NMES with SAQ set             BFR?                                                                               Ther Ex    Elliptical vs bike for ROM and CV endurance  4 min elliptical            Supine SLR 10x ea  10x ea           Seated LAQ 10x ea 2x10 5#           Side lying hip abd 10x (L) 2x10 (L)           Side stepping with TB Pink TB 2x10 Blue TB standing hip abd to fatiue            Bridge w/ hip abd ISO  2x10            Squats w/ YBall  2x15 holding 15#           Standing hip ext  Blue TB 15x ea                                                                Ther Activity                              Gait Training                              Modalities

## 2022-12-19 ENCOUNTER — OFFICE VISIT (OUTPATIENT)
Dept: PHYSICAL THERAPY | Facility: CLINIC | Age: 52
End: 2022-12-19

## 2022-12-19 DIAGNOSIS — S83.242A OTHER TEAR OF MEDIAL MENISCUS, CURRENT INJURY, LEFT KNEE, INITIAL ENCOUNTER: ICD-10-CM

## 2022-12-19 DIAGNOSIS — M17.12 PRIMARY OSTEOARTHRITIS OF LEFT KNEE: Primary | ICD-10-CM

## 2022-12-19 NOTE — PROGRESS NOTES
Daily Note     Today's date: 2022  Patient name: Eliot Renteria  : 1970  MRN: [id number]  Referring provider: Ofe Almaguer  Dx:   Encounter Diagnosis     ICD-10-CM    1  Primary osteoarthritis of left knee  M17 12       2  Other tear of medial meniscus, current injury, left knee, initial encounter  S80 250A                      Subjective: Pt states that he is feeling about the same  Reports that he was a little tired following last session  Indicates that he has been running and walking regularly  Objective: See treatment diary below      Assessment: Tolerated treatment well  Activities were performed to improve LE and core strength  Blood flow restriction exercises to added today to target the (L) hip flexors, (L) quad, and (L) hamstrings  Pt reported fatigue following exercises  Activities were also performed to improve hip abductor strength  Pt denied pain throughout session  Patient demonstrated fatigue post treatment and would benefit from continued PT      Plan: Continue per plan of care  Precautions: Per MD note - Patient NOT to take with other NSAIDs or oral steroids      Daily Treatment Diary    Date           FOTO IE -             Re-Eval IE               Manuals    Knee PROM   JM          LLL  meniscus injury protocol  meniscus injury protocol                                     Neuro Re-Ed     NMES with SAQ set             BFR  278 - 100%   230 - 80%  4 sets,     Reps: 30, 15, 15, 15                                                                            Ther Ex    Elliptical vs bike for ROM and CV endurance  4 min elliptical  4 min elliptical           Supine SLR 10x ea  10x ea BFR at 230 no #          Seated LAQ 10x ea 2x10 5# BFR at 230 no #          Side lying hip abd 10x (L) 2x10 (L)           Side stepping with TB Pink TB 2x10 Blue TB standing hip abd to fatiue  Blue TB standing hip abd to fatiue          Bridge w/ HS curl  2x10  BFR at 230 no #          Squats w/ YBall  2x15 holding 15#           Standing hip ext  Blue TB 15x ea  Blue TB 20x ea                                                               Ther Activity                              Gait Training                              Modalities

## 2022-12-21 ENCOUNTER — OFFICE VISIT (OUTPATIENT)
Dept: PHYSICAL THERAPY | Facility: CLINIC | Age: 52
End: 2022-12-21

## 2022-12-21 DIAGNOSIS — S83.242A OTHER TEAR OF MEDIAL MENISCUS, CURRENT INJURY, LEFT KNEE, INITIAL ENCOUNTER: ICD-10-CM

## 2022-12-21 DIAGNOSIS — M17.12 PRIMARY OSTEOARTHRITIS OF LEFT KNEE: Primary | ICD-10-CM

## 2022-12-21 NOTE — PROGRESS NOTES
Daily Note     Today's date: 2022  Patient name: Rip Lesch  : 1970  MRN: [id number]  Referring provider: Shy Foley  Dx:   Encounter Diagnosis     ICD-10-CM    1  Primary osteoarthritis of left knee  M17 12       2  Other tear of medial meniscus, current injury, left knee, initial encounter  M24 056I                      Subjective: Pt states that he is feeling a little more achy and sore today  Denies any mechanism or changes since last session  Objective: See treatment diary below      Assessment: Tolerated treatment well  Activities were continued to improve LE and core strength  Blood flow restriction exercises to added today to target the (L) hip flexors, (L) quad, and (L) gastroc  Pt reported fatigue following exercises  Activities were also performed to improve hip abductor strength  Pt reported feeling about the same following today's session  Patient demonstrated fatigue post treatment and would benefit from continued PT      Plan: Continue per plan of care  Precautions: Per MD note - Patient NOT to take with other NSAIDs or oral steroids      Daily Treatment Diary    Date          FOTO IE -             Re-Eval IE               Manuals    Knee PROM   JM JM         LLL  meniscus injury protocol  meniscus injury protocol                                     Neuro Re-Ed     NMES with SAQ set             BFR  278 - 100%   230 - 80%  4 sets,     Reps: 30, 15, 15, 15  4 sets,     Reps: 30, 15, 15, 15                                                                           Ther Ex    Elliptical vs bike for ROM and CV endurance  4 min elliptical  4 min elliptical  4 min elliptical          Supine SLR 10x ea  10x ea BFR at 230 no # BFR at 230 1 5#         Seated LAQ 10x ea 2x10 5# BFR at 230 no # BFR at 230 4#         Leg Press    BFR at 230 135#         Side stepping with TB Pink TB 2x10 Blue TB standing hip abd to fatiue  Blue TB standing hip abd to fatiue Blue TB standing hip abd to fatiue         Bridge w/ HS curl  2x10  BFR at 230 no #          Calf raise    BFR at 230 (B)         Standing hip ext  Blue TB 15x ea  Blue TB 20x ea                                                               Ther Activity                              Gait Training                              Modalities

## 2022-12-28 ENCOUNTER — OFFICE VISIT (OUTPATIENT)
Dept: PHYSICAL THERAPY | Facility: CLINIC | Age: 52
End: 2022-12-28

## 2022-12-28 DIAGNOSIS — M17.12 PRIMARY OSTEOARTHRITIS OF LEFT KNEE: Primary | ICD-10-CM

## 2022-12-28 DIAGNOSIS — S83.242A OTHER TEAR OF MEDIAL MENISCUS, CURRENT INJURY, LEFT KNEE, INITIAL ENCOUNTER: ICD-10-CM

## 2022-12-28 NOTE — PROGRESS NOTES
Daily Note     Today's date: 2022  Patient name: Royce Chatman  : 1970  MRN: [id number]  Referring provider: Henri Bruner  Dx:   Encounter Diagnosis     ICD-10-CM    1  Primary osteoarthritis of left knee  M17 12       2  Other tear of medial meniscus, current injury, left knee, initial encounter  S84 484F                      Subjective: Prior to today's session, Pt states that his knee is feeling "so-so"  Reports that his has not gone for any runs as of late with the holiday  Objective: See treatment diary below      Assessment: Tolerated treatment well  Continued to progress strengthening activities to improve LE and core strength  Occasional rest breaks given throughout session due to fatigue  Focused on improve quad and hip abductor strength  Pt has demonstrated improve strength with activities listed below  Patient demonstrated fatigue post treatment and would benefit from continued PT      Plan: Continue per plan of care  Precautions: Per MD note - Patient NOT to take with other NSAIDs or oral steroids      Daily Treatment Diary    Date         FOTO IE -             Re-Eval IE               Manuals    Knee PROM   JM JM JM        LLL  meniscus injury protocol  meniscus injury protocol                                     Neuro Re-Ed     NMES with SAQ set             BFR  278 - 100%   230 - 80%  4 sets,     Reps: 30, 15, 15, 15  4 sets,     Reps: 30, 15, 15, 15  4 sets,     Reps: 30, 15, 15, 15                                                                          Ther Ex    Elliptical vs bike for ROM and CV endurance  4 min elliptical  4 min elliptical  4 min elliptical 4 min elliptical        Supine SLR 10x ea  10x ea BFR at 230 no # BFR at 230 1 5# BFR at 230 2#        Seated LAQ 10x ea 2x10 5# BFR at 230 no # BFR at 230 4# NV        Leg Press    BFR at 230 135# NV        Side stepping with TB Pink TB 2x10 Blue TB standing hip abd to fatiue Blue TB standing hip abd to fatiue Blue TB standing hip abd to fatiue Side lying hip abd 3x15 1 5# (L)        Bridge w/ HS curl  2x10  BFR at 230 no #  BFR at 230 no #        Calf raise    BFR at 230 (B) BFR at 230 no #  Last set - SL        Squat                                                                  Ther Activity                              Gait Training                              Modalities

## 2022-12-30 ENCOUNTER — OFFICE VISIT (OUTPATIENT)
Dept: PHYSICAL THERAPY | Facility: CLINIC | Age: 52
End: 2022-12-30

## 2022-12-30 DIAGNOSIS — M17.12 PRIMARY OSTEOARTHRITIS OF LEFT KNEE: Primary | ICD-10-CM

## 2022-12-30 DIAGNOSIS — S83.242A OTHER TEAR OF MEDIAL MENISCUS, CURRENT INJURY, LEFT KNEE, INITIAL ENCOUNTER: ICD-10-CM

## 2022-12-30 NOTE — PROGRESS NOTES
Daily Note + Discharge note    Today's date: 2022  Patient name: Manas Brooks  : 1970  MRN: [id number]  Referring provider: Nayeli Han  Dx:   Encounter Diagnosis     ICD-10-CM    1  Primary osteoarthritis of left knee  M17 12       2  Other tear of medial meniscus, current injury, left knee, initial encounter  S89 281A                      Subjective: Pt states that he is doing well  Denies any changes since last session  Reports that he was able to go for a short hike a few days ago  Objective: See treatment diary below    Y-balance:    - Right on ground        * fwd - 70       * left - 83        * right - 84    - Left on ground        * fwd - 66        * right - 86        * left - 80      Assessment: Tolerated treatment well  Patient demonstrated fatigue post treatment and exhibited good technique with therapeutic exercises  Plan to discharge patient today with HEP  Pt has exhibited improve functional endurance and reported decreased pain proximally  Noted improved L quad strength and improve SL balance on the L with Y-balance test        Plan:   Advised patient to continue with home exercise program which I reviewed with them today  Will await follow up with orthopedic  Advised patient to contact me with any future questions or concerns regarding exercise  Pt is in agreement with discharge plan  Precautions: Per MD note - Patient NOT to take with other NSAIDs or oral steroids      Daily Treatment Diary    Date        FOTO IE -             Re-Eval IE               Manuals    Knee PROM   CALOS LIVE       LLL  meniscus injury protocol  meniscus injury protocol                                     Neuro Re-Ed     NMES with SAQ set             BFR  278 - 100%     230 - 80%  4 sets,     Reps: 30, 15, 15, 15  4 sets,     Reps: 30, 15, 15, 15  4 sets,     Reps: 30, 15, 15, 15  4 sets,     Reps: 30, 15, 15, 15 Ther Ex    Elliptical vs bike for ROM and CV endurance  4 min elliptical  4 min elliptical  4 min elliptical 4 min elliptical 4 min elliptical       Supine SLR 10x ea  10x ea BFR at 230 no # BFR at 230 1 5# BFR at 230 2# BFR at 230 3#       Seated LAQ 10x ea 2x10 5# BFR at 230 no # BFR at 230 4# NV BFR at 230 4# w/ add ISO with purple MBall       Leg Press    BFR at 230 135# NV BFR at 230 55# SL - heel raise with TKE on last 3 sets       Side stepping with TB Pink TB 2x10 Blue TB standing hip abd to fatiue  Blue TB standing hip abd to fatiue Blue TB standing hip abd to fatiue Side lying hip abd 3x15 1 5# (L) Side lying hip abd 3x15 2 5# (L)       Bridge w/ HS curl  2x10  BFR at 230 no #  BFR at 230 no #        Calf raise    BFR at 230 (B) BFR at 230 no #  Last set - SL        Squat       On rockerboard 2x20       Eccentric heel tap      2x10 6" step                                              Ther Activity                              Gait Training                              Modalities

## 2023-01-09 ENCOUNTER — OFFICE VISIT (OUTPATIENT)
Dept: OBGYN CLINIC | Facility: MEDICAL CENTER | Age: 53
End: 2023-01-09

## 2023-01-09 ENCOUNTER — APPOINTMENT (OUTPATIENT)
Dept: LAB | Facility: MEDICAL CENTER | Age: 53
End: 2023-01-09

## 2023-01-09 VITALS
SYSTOLIC BLOOD PRESSURE: 154 MMHG | BODY MASS INDEX: 32.64 KG/M2 | HEIGHT: 70 IN | WEIGHT: 228 LBS | DIASTOLIC BLOOD PRESSURE: 99 MMHG | HEART RATE: 67 BPM

## 2023-01-09 DIAGNOSIS — S83.242A OTHER TEAR OF MEDIAL MENISCUS, CURRENT INJURY, LEFT KNEE, INITIAL ENCOUNTER: Primary | ICD-10-CM

## 2023-01-09 DIAGNOSIS — R73.9 HYPERGLYCEMIA: ICD-10-CM

## 2023-01-09 DIAGNOSIS — Z01.818 PRE-OP TESTING: ICD-10-CM

## 2023-01-09 DIAGNOSIS — E78.2 MIXED HYPERLIPIDEMIA: ICD-10-CM

## 2023-01-09 DIAGNOSIS — S83.242A OTHER TEAR OF MEDIAL MENISCUS, CURRENT INJURY, LEFT KNEE, INITIAL ENCOUNTER: ICD-10-CM

## 2023-01-09 LAB
ALBUMIN SERPL BCP-MCNC: 4 G/DL (ref 3.5–5)
ALP SERPL-CCNC: 46 U/L (ref 46–116)
ALT SERPL W P-5'-P-CCNC: 34 U/L (ref 12–78)
ANION GAP SERPL CALCULATED.3IONS-SCNC: 4 MMOL/L (ref 4–13)
AST SERPL W P-5'-P-CCNC: 27 U/L (ref 5–45)
BILIRUB SERPL-MCNC: 0.41 MG/DL (ref 0.2–1)
BUN SERPL-MCNC: 19 MG/DL (ref 5–25)
CALCIUM SERPL-MCNC: 9.4 MG/DL (ref 8.3–10.1)
CHLORIDE SERPL-SCNC: 107 MMOL/L (ref 96–108)
CHOLEST SERPL-MCNC: 200 MG/DL
CO2 SERPL-SCNC: 28 MMOL/L (ref 21–32)
CREAT SERPL-MCNC: 0.9 MG/DL (ref 0.6–1.3)
ERYTHROCYTE [DISTWIDTH] IN BLOOD BY AUTOMATED COUNT: 13.1 % (ref 11.6–15.1)
GFR SERPL CREATININE-BSD FRML MDRD: 97 ML/MIN/1.73SQ M
GLUCOSE P FAST SERPL-MCNC: 99 MG/DL (ref 65–99)
HCT VFR BLD AUTO: 43.7 % (ref 36.5–49.3)
HDLC SERPL-MCNC: 71 MG/DL
HGB BLD-MCNC: 14.3 G/DL (ref 12–17)
LDLC SERPL CALC-MCNC: 114 MG/DL (ref 0–100)
MCH RBC QN AUTO: 29.9 PG (ref 26.8–34.3)
MCHC RBC AUTO-ENTMCNC: 32.7 G/DL (ref 31.4–37.4)
MCV RBC AUTO: 91 FL (ref 82–98)
NONHDLC SERPL-MCNC: 129 MG/DL
PLATELET # BLD AUTO: 271 THOUSANDS/UL (ref 149–390)
PMV BLD AUTO: 10.2 FL (ref 8.9–12.7)
POTASSIUM SERPL-SCNC: 4.7 MMOL/L (ref 3.5–5.3)
PROT SERPL-MCNC: 7.4 G/DL (ref 6.4–8.4)
RBC # BLD AUTO: 4.78 MILLION/UL (ref 3.88–5.62)
SODIUM SERPL-SCNC: 139 MMOL/L (ref 135–147)
TRIGL SERPL-MCNC: 76 MG/DL
WBC # BLD AUTO: 6.38 THOUSAND/UL (ref 4.31–10.16)

## 2023-01-09 RX ORDER — ACETAMINOPHEN 325 MG/1
650 TABLET ORAL EVERY 6 HOURS PRN
Status: CANCELLED | OUTPATIENT
Start: 2023-01-09

## 2023-01-09 RX ORDER — TRAMADOL HYDROCHLORIDE 50 MG/1
50 TABLET ORAL EVERY 6 HOURS PRN
Status: CANCELLED | OUTPATIENT
Start: 2023-01-09

## 2023-01-09 RX ORDER — CLINDAMYCIN PHOSPHATE 900 MG/50ML
900 INJECTION INTRAVENOUS ONCE
Status: CANCELLED | OUTPATIENT
Start: 2023-01-17 | End: 2023-01-09
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
## 2023-01-11 ENCOUNTER — CONSULT (OUTPATIENT)
Dept: FAMILY MEDICINE CLINIC | Facility: CLINIC | Age: 53
End: 2023-01-11

## 2023-01-11 VITALS
HEIGHT: 70 IN | SYSTOLIC BLOOD PRESSURE: 110 MMHG | BODY MASS INDEX: 32.64 KG/M2 | DIASTOLIC BLOOD PRESSURE: 78 MMHG | HEART RATE: 71 BPM | OXYGEN SATURATION: 98 % | WEIGHT: 228 LBS

## 2023-01-11 DIAGNOSIS — Z23 NEED FOR ZOSTER VACCINE: ICD-10-CM

## 2023-01-11 DIAGNOSIS — R73.9 HYPERGLYCEMIA: ICD-10-CM

## 2023-01-11 DIAGNOSIS — Z01.818 PRE-OP EXAM: Primary | ICD-10-CM

## 2023-01-11 DIAGNOSIS — I49.1 PAC (PREMATURE ATRIAL CONTRACTION): ICD-10-CM

## 2023-01-11 DIAGNOSIS — E78.2 MIXED HYPERLIPIDEMIA: ICD-10-CM

## 2023-01-11 DIAGNOSIS — S83.242A OTHER TEAR OF MEDIAL MENISCUS, CURRENT INJURY, LEFT KNEE, INITIAL ENCOUNTER: ICD-10-CM

## 2023-01-11 NOTE — ASSESSMENT & PLAN NOTE
Total cholesterol and LDL are a bit elevated, but the HDL was fantastic  Continue on Crestor  Check in 6 months  Reviewed about increasing Crestor to decrease the LDL, but his prior LDL was below 100  With that, would expect that with increased exercise after having the meniscus repair he would be improved
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
## 2023-01-11 NOTE — PATIENT INSTRUCTIONS
Pre-Surgery Instructions:   Medication Instructions    diclofenac (VOLTAREN) 75 mg EC tablet Stop taking 1 week prior to surgery    escitalopram (LEXAPRO) 20 mg tablet per anesthesia guidelines     Multiple Vitamin (multivitamin) tablet Stop taking 1 week prior to surgery    rosuvastatin (CRESTOR) 10 MG tablet per anesthesia guidelines      1  Pre-op exam  -     POCT ECG        COVID 19 Instructions    Dylan Chapa was advised to limit contact with others to essential tasks such as getting food, medications, and medical care  Proper handwashing reviewed, and Hand sanitzer when washing is not available  If the patient develops symptoms of COVID 19, the patient should call the office as soon as possible  For 9200-2750 Flu season, it is strongly recommended that Flu Vaccinations be obtained  Virtual Visits:  [place]: This works on smart phones (any phone with Internet browsing capability)  You should get a text message when the provider is ready to see you  Click on the link in the text message, and the call should start  You will need to type in your name, and allow camera and microphone access  This is HIPPA compliant, and secure  If you have not already done so, get immunized to COVID 19  We are committed to getting you vaccinated as soon as possible and will be closely following CDC and [facility]  guidelines as they are released and revised  Please refer to our COVID-19 vaccine webpage for the most up to date information on the vaccine and our distribution efforts  This site will also have the most up to date recommendations for COVID booster vaccine  [facility]    Call [phone] ([phone]), option 7    OUR NEW LOCATION:    [address], [address]  Fax: [phone]    Lab services and OB/GYN are at this location as well

## 2023-01-11 NOTE — ASSESSMENT & PLAN NOTE
PACs were noted the past   EKG today did not reveal any abnormalities  No treatment needed at this point  Try to avoid stimulants when possible

## 2023-01-11 NOTE — ASSESSMENT & PLAN NOTE
Patient appears to be optimized for the planned medial meniscectomy by arthroscopy  Follow-up with orthopedics afterwards
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
## 2023-01-12 NOTE — PRE-PROCEDURE INSTRUCTIONS
Pre-Surgery Instructions:   Medication Instructions   • diclofenac (VOLTAREN) 75 mg EC tablet Stop 1/12  Do not take morning of surgery   • escitalopram (LEXAPRO) 20 mg tablet Take day of surgery  • Multiple Vitamin (multivitamin) tablet Stop 1/12  Do not take morning of surgery   • rosuvastatin (CRESTOR) 10 MG tablet Take day of surgery  Covid screening negative as per patient  Fully vaccinated  Reviewed showering and medication instructions  Instructed to stop NSAIDS and non prescribed vitamins  Tylenol is OK to take  Take medications morning of surgery with a small sip of water  Patient verbalized understanding  Advised NPO after MN and ASC will call with scheduled surgical time  Advised to notify surgeon's office for any illness prior to day of surgery

## 2023-01-16 ENCOUNTER — ANESTHESIA EVENT (OUTPATIENT)
Dept: PERIOP | Facility: HOSPITAL | Age: 53
End: 2023-01-16

## 2023-01-17 ENCOUNTER — HOSPITAL ENCOUNTER (OUTPATIENT)
Facility: HOSPITAL | Age: 53
Setting detail: OUTPATIENT SURGERY
Discharge: HOME | End: 2023-01-17
Attending: ORTHOPAEDIC SURGERY | Admitting: ORTHOPAEDIC SURGERY

## 2023-01-17 ENCOUNTER — ANESTHESIA (OUTPATIENT)
Dept: PERIOP | Facility: HOSPITAL | Age: 53
End: 2023-01-17

## 2023-01-17 VITALS
WEIGHT: 228 LBS | OXYGEN SATURATION: 98 % | SYSTOLIC BLOOD PRESSURE: 133 MMHG | RESPIRATION RATE: 18 BRPM | HEIGHT: 70 IN | DIASTOLIC BLOOD PRESSURE: 93 MMHG | BODY MASS INDEX: 32.64 KG/M2 | TEMPERATURE: 97.6 F | HEART RATE: 60 BPM

## 2023-01-17 DIAGNOSIS — Z98.890 S/P MENISCECTOMY: Primary | ICD-10-CM

## 2023-01-17 RX ORDER — ONDANSETRON 2 MG/ML
INJECTION INTRAMUSCULAR; INTRAVENOUS AS NEEDED
Status: DISCONTINUED | OUTPATIENT
Start: 2023-01-17 | End: 2023-01-17

## 2023-01-17 RX ORDER — FENTANYL CITRATE/PF 50 MCG/ML
50 SYRINGE (ML) INJECTION
Status: DISCONTINUED | OUTPATIENT
Start: 2023-01-17 | End: 2023-01-17 | Stop reason: HOSPADM

## 2023-01-17 RX ORDER — KETOROLAC TROMETHAMINE 30 MG/ML
INJECTION, SOLUTION INTRAMUSCULAR; INTRAVENOUS AS NEEDED
Status: DISCONTINUED | OUTPATIENT
Start: 2023-01-17 | End: 2023-01-17

## 2023-01-17 RX ORDER — LIDOCAINE HYDROCHLORIDE 10 MG/ML
INJECTION, SOLUTION EPIDURAL; INFILTRATION; INTRACAUDAL; PERINEURAL AS NEEDED
Status: DISCONTINUED | OUTPATIENT
Start: 2023-01-17 | End: 2023-01-17 | Stop reason: HOSPADM

## 2023-01-17 RX ORDER — HYDROMORPHONE HCL/PF 1 MG/ML
0.5 SYRINGE (ML) INJECTION
Status: DISCONTINUED | OUTPATIENT
Start: 2023-01-17 | End: 2023-01-17 | Stop reason: HOSPADM

## 2023-01-17 RX ORDER — SODIUM CHLORIDE, SODIUM LACTATE, POTASSIUM CHLORIDE, CALCIUM CHLORIDE 600; 310; 30; 20 MG/100ML; MG/100ML; MG/100ML; MG/100ML
125 INJECTION, SOLUTION INTRAVENOUS CONTINUOUS
Status: DISCONTINUED | OUTPATIENT
Start: 2023-01-17 | End: 2023-01-17 | Stop reason: HOSPADM

## 2023-01-17 RX ORDER — FENTANYL CITRATE 50 UG/ML
INJECTION, SOLUTION INTRAMUSCULAR; INTRAVENOUS AS NEEDED
Status: DISCONTINUED | OUTPATIENT
Start: 2023-01-17 | End: 2023-01-17

## 2023-01-17 RX ORDER — LIDOCAINE HYDROCHLORIDE 10 MG/ML
INJECTION, SOLUTION EPIDURAL; INFILTRATION; INTRACAUDAL; PERINEURAL AS NEEDED
Status: DISCONTINUED | OUTPATIENT
Start: 2023-01-17 | End: 2023-01-17

## 2023-01-17 RX ORDER — DEXAMETHASONE SODIUM PHOSPHATE 10 MG/ML
INJECTION, SOLUTION INTRAMUSCULAR; INTRAVENOUS AS NEEDED
Status: DISCONTINUED | OUTPATIENT
Start: 2023-01-17 | End: 2023-01-17

## 2023-01-17 RX ORDER — TRAMADOL HYDROCHLORIDE 50 MG/1
50 TABLET ORAL EVERY 6 HOURS PRN
Status: DISCONTINUED | OUTPATIENT
Start: 2023-01-17 | End: 2023-01-17 | Stop reason: HOSPADM

## 2023-01-17 RX ORDER — MAGNESIUM HYDROXIDE 1200 MG/15ML
LIQUID ORAL AS NEEDED
Status: DISCONTINUED | OUTPATIENT
Start: 2023-01-17 | End: 2023-01-17 | Stop reason: HOSPADM

## 2023-01-17 RX ORDER — MIDAZOLAM HYDROCHLORIDE 2 MG/2ML
INJECTION, SOLUTION INTRAMUSCULAR; INTRAVENOUS AS NEEDED
Status: DISCONTINUED | OUTPATIENT
Start: 2023-01-17 | End: 2023-01-17

## 2023-01-17 RX ORDER — ASPIRIN 325 MG
325 TABLET, DELAYED RELEASE (ENTERIC COATED) ORAL DAILY
Qty: 30 TABLET | Refills: 0 | Status: SHIPPED | OUTPATIENT
Start: 2023-01-17

## 2023-01-17 RX ORDER — PROPOFOL 10 MG/ML
INJECTION, EMULSION INTRAVENOUS AS NEEDED
Status: DISCONTINUED | OUTPATIENT
Start: 2023-01-17 | End: 2023-01-17

## 2023-01-17 RX ORDER — ACETAMINOPHEN 325 MG/1
650 TABLET ORAL EVERY 6 HOURS PRN
Status: DISCONTINUED | OUTPATIENT
Start: 2023-01-17 | End: 2023-01-17 | Stop reason: HOSPADM

## 2023-01-17 RX ORDER — CLINDAMYCIN PHOSPHATE 900 MG/50ML
900 INJECTION INTRAVENOUS ONCE
Status: COMPLETED | OUTPATIENT
Start: 2023-01-17 | End: 2023-01-17

## 2023-01-17 RX ORDER — BUPIVACAINE HYDROCHLORIDE 2.5 MG/ML
INJECTION, SOLUTION EPIDURAL; INFILTRATION; INTRACAUDAL AS NEEDED
Status: DISCONTINUED | OUTPATIENT
Start: 2023-01-17 | End: 2023-01-17 | Stop reason: HOSPADM

## 2023-01-17 RX ORDER — OXYCODONE HYDROCHLORIDE 5 MG/1
5 TABLET ORAL EVERY 4 HOURS PRN
Qty: 15 TABLET | Refills: 0 | Status: SHIPPED | OUTPATIENT
Start: 2023-01-17

## 2023-01-17 RX ADMIN — SODIUM CHLORIDE, SODIUM LACTATE, POTASSIUM CHLORIDE, AND CALCIUM CHLORIDE: .6; .31; .03; .02 INJECTION, SOLUTION INTRAVENOUS at 08:21

## 2023-01-17 RX ADMIN — KETOROLAC TROMETHAMINE 30 MG: 30 INJECTION, SOLUTION INTRAMUSCULAR at 08:57

## 2023-01-17 RX ADMIN — DEXAMETHASONE SODIUM PHOSPHATE 10 MG: 10 INJECTION INTRAMUSCULAR; INTRAVENOUS at 08:42

## 2023-01-17 RX ADMIN — CLINDAMYCIN IN 5 PERCENT DEXTROSE 900 MG: 18 INJECTION, SOLUTION INTRAVENOUS at 08:25

## 2023-01-17 RX ADMIN — LIDOCAINE HYDROCHLORIDE 50 MG: 10 INJECTION, SOLUTION EPIDURAL; INFILTRATION; INTRACAUDAL; PERINEURAL at 08:35

## 2023-01-17 RX ADMIN — MIDAZOLAM 2 MG: 1 INJECTION INTRAMUSCULAR; INTRAVENOUS at 08:33

## 2023-01-17 RX ADMIN — ONDANSETRON 4 MG: 2 INJECTION INTRAMUSCULAR; INTRAVENOUS at 08:42

## 2023-01-17 RX ADMIN — PROPOFOL 200 MG: 10 INJECTION, EMULSION INTRAVENOUS at 08:35

## 2023-01-17 RX ADMIN — FENTANYL CITRATE 25 MCG: 50 INJECTION, SOLUTION INTRAMUSCULAR; INTRAVENOUS at 08:35

## 2023-01-17 RX ADMIN — FENTANYL CITRATE 25 MCG: 50 INJECTION, SOLUTION INTRAMUSCULAR; INTRAVENOUS at 08:41

## 2023-01-17 RX ADMIN — FENTANYL CITRATE 50 MCG: 50 INJECTION, SOLUTION INTRAMUSCULAR; INTRAVENOUS at 08:46

## 2023-01-17 NOTE — ANESTHESIA POSTPROCEDURE EVALUATION
Post-Op Assessment Note    CV Status:  Stable  Pain Score: 0    Pain management: adequate     Mental Status:  Alert   Hydration Status:  Stable   PONV Controlled:  Controlled   Airway Patency:  Patent      Post Op Vitals Reviewed: Yes      Staff: CRNA         No notable events documented      /89 (01/17/23 0928)    Temp 98 1 °F (36 7 °C) (01/17/23 0928)    Pulse 66 (01/17/23 0928)   Resp 20 (01/17/23 0928)    SpO2 95 % (01/17/23 0928) verbalizes understanding/good return demonstration/needs met

## 2023-01-17 NOTE — INTERVAL H&P NOTE
H&P reviewed  After examining the patient I find no changes in the patients condition since the H&P had been written      Abd: soft nt nd    Vitals:    01/17/23 0711   BP: 140/89   Pulse: 59   Resp: 18   Temp: (!) 97 1 °F (36 2 °C)   SpO2: 99%

## 2023-01-17 NOTE — DISCHARGE INSTR - AVS FIRST PAGE
POSTOPERATIVE INSTRUCTIONS following KNEE SURGERY    MEDICATIONS:  Resume all home medications unless otherwise instructed by your surgeon  Pain Medication:  Oxycodone 5 mg, 1 tablet every 4 hours as needed  If you were given a regional anesthetic (nerve block), please begin taking the pain medication as soon as you get home, even if you have minimal or no pain  DO NOT WAIT FOR THE NERVE BLOCK TO WEAR OFF  Possible side effects include nausea, constipation, and urinary retention  If you experience these side effects, please call our office for assistance  Pain med refills are authorized only during office hours (8am-4pm, Mon-Fri)  Anti-Inflammatory:  Ibuprofen 600 mg, 1 tablet every 8 hours for 4 weeks and Tylenol 325 mg, 1-2 tablets every 6 hours for 4 weeks  Take with food  Stop if you experience nausea, reflux, or stomach pain  Nausea Medication:  None  Fill prescription ONLY if you expericnce severe nausea  Blood Clot Prevention:  Aspirin 325 mg, 1 tablet daily for 30 days  Pump your foot up and down 20 times per hour while you are less mobile  WOUND CARE:  Keep the dressing clean and dry  Light drainage may occur the first 2 days postop  You may remove the dressings and get the incision wet in the shower 72 hours after surgery  DO NOT remove steri-strips or sutures  DO NOT immerse the incision under water  Carefully pat the incision dry  If there is wound drainage, re-apply a fresh dry gauze dressing  Please call our office ([phone]) if you experience either of the following:  Sudden increase in swelling, redness, or warmth at the surgical site  Excessive incisional drainage that persists beyond the 3rd day after surgery  Oral temperature greater than 101 degrees, not relieved with Tylenol  Shortness of breath, chest pain, nausea, or any other concerning symptoms    SWELLING CONTROL:  Cold Therapy:   The cold therapy device may be used either continuously or only as needed, according to your preference  Do not let the pad directly touch your skin  Alternatively, apply ice (20 min on, 20 min off) as often as you feel is necessary  Elevation:  Elevate the entire leg above heart level  Place pillows under your ankle to keep your knee straight  Compression:  Apply ACE wraps or a thigh-length compression stocking as needed  RANGE OF MOTION:  You are allowed FULL RANGE OF MOTION as tolerated  IMMOBILIZATION:  None  You are allowed full range of motion as tolerated  ACTIVITY:   BEAR FULL WEIGHT AS TOLERATED on the operative leg  Use crutches to assist only as needed  Using Crutches on Stairs:  Going up, lead with your "good" (nonoperative) leg  Going down, lead with your "bad" (operative) leg  Use a hand rail when available  Knee Extension:  Place a rolled towel or pillow under your ankle for 20-30 minutes 3-5 times per day  This will help to maintain full knee extension  Quad Sets:  Sit or lie with your knee straight  Tighten your quadriceps (front thigh) muscle  Hold for 3 seconds, then relax  Repeat 20 times per hour while awake  PHYSICAL THERAPY:  Begin therapy 5 TO 7 DAYS AFTER SURGERY  You were given a prescription for therapy at your preoperative office visit  If you do not have physical therapy scheduled yet, please call our office for assistance  FOLLOW-UP APPOINTMENT:  7-10 days after surgery with:    [facility]  [address] [facility], [address] [place] [address], [address] [facility]  [phone] ([facility])  [phone] (After-Hours)

## 2023-01-17 NOTE — NURSING NOTE
Patient declined Tramadol when taken to his room  "I don't think I need it " Rated pressure 5/10 in left knee

## 2023-01-17 NOTE — NURSING NOTE
Tolerated toast and drink  Ambulated without distress  Dressing/ace wrap remain clean, dry and intact  No drainage  Voided

## 2023-01-17 NOTE — OP NOTE
OPERATIVE REPORT  PATIENT NAME: Twin Minaya    :  1970  MRN: [id number]  Pt Location:  OR ROOM 12    SURGERY DATE: 2023    Surgeon(s) and Role:     * Eduardo Nelson DO - Primary     * Bailey Andrade - Assisting    Preop Diagnosis:  Other tear of medial meniscus, current injury, left knee, initial encounter [S83 242A]    Post-Op Diagnosis Codes:     * Other tear of medial meniscus, current injury, left knee, initial encounter [S83 242A]    Procedure(s) (LRB):  LEFT KNEE ARTHROSCOPIC, MENISCECTOMY OF MEDIAL MENISCUS (Left)    Specimen(s):  * No specimens in log *    Estimated Blood Loss:   Minimal    Drains:  * No LDAs found *    Anesthesia Type:   General    Operative Indications: Other tear of medial meniscus, current injury, left knee, initial encounter [K13 345T]      Complications:   None    Procedure and Technique:        Pre-operative Diagnosis: Left knee medial meniscus complex tear     Post-operative Diagnosis: Left knee medial meniscus complex tear     Operation:  Surgical arthroscopy of the Left knee with partial medial meniscectomy, CPT 09649      Anesthesia: general     Tourniquet Time: 25 min     Blood Loss:  Minimal      Indications: Mr Rosas Todd is a 46 y o  male with a medial meniscus tear  An MRI was obtained a revealed a tear of the Left medial meniscus   Due to the patient's MRI findings, active lifestyle, and lack of improvement with a conservative approach, it was recommended that they proceed forward with arthroscopic surgical management of this problem  We reviewed risks and benefits of surgery and a decision was made to proceed with surgery to address the torn medial meniscus             Findings:       Examination under anesthesia of the operative Left knee revealed a range of motion of 0-130 degrees  Posterior drawer testing was negative  Lachman testing was negative   Pivot shift testing was negative,  Collateral ligament stability testing revealed no laxity with valgus or varus stresses  With respect to posterolateral corner testing, dial testing at 30 and at 90 degrees was symmetric to the contralateral knee  Arthroscopic evaluation of the knee revealed the following:     Medial meniscus: There was a complex, multidirectional tear of the medial meniscus      Medial femoral condyle:Grade I chondral defects  Medial tibial plateau: Grade  I chondral defects  Anterior cruciate ligament: Normal appearance  Posterior cruciate ligament: Normal appearance  Lateral meniscus: No tears  Lateral femoral condyle: Grade I chondral defects  Lateral tibial plateau: Grade0 chondral defects     Medial and lateral gutters: No loose bodies  Patella: Grade 0 chondral defects  Trochlea: Grade II chondral defects  Medial plica: No significant plica was present             Procedure:  In the pre-operative holding area, the patient identified the correct operative extremity and I marked that extremity with my initials, using a permanent marker  The patient was brought to the operating room and positioned supine  Following satisfactory induction of anesthesia, the Left knee was prepped and draped in the usual sterile fashion for surgical arthroscopy of the Left knee  Before any surgical instrumentation was passed to me by the surgical technician, a formalized time-out occurred, which involves the surgeon, circulating nurse, and anesthesia staff all verifying the correct operative extremity  My initials were visible on the prepped and draped operative field  The anatomic landmarks of the anteromedial and anterolateral portals were marked  The anterolateral portal was established with a scalpel  The arthroscope was introduced through this portal  Under direct visualization, the anteromedial portal was established with a localizing needle followed by a scalpel   A probe was then introduced into the anteromedial portal  A systematic diagnostic arthroscopy evaluated the following:  medial compartment, notch, lateral compartment, patellofemoral compartment, medial gutter, and lateral gutter  A complex medial meniscus tear was noted  This tear was associated with meniscal fragments that were grossly unstable to probing  The medial meniscus tear was debrided to a stable base, using the arthroscopic biters and motorized shaver      There was no additional pathology  All particulate debris was removed  The knee was copiously rinsed and then drained  The portals were closed with an interrupted 4-0 monocryl absorbable suture  The skin was cleansed with sterile saline and dried before Steri-Strips were applied  Finally, a sterile dressing was secured by Webril and an Ace wrap            I was present for the entire procedure, A qualified resident physician was not available and A physician assistant was required during the procedure for retraction tissue handling,dissection and suturing    Patient Disposition:  PACU         SIGNATURE: Yary Bhatti DO  DATE: January 17, 2023  TIME: 9:18 AM

## 2023-01-17 NOTE — ANESTHESIA PREPROCEDURE EVALUATION
Procedure:  ARTHROSCOPIC MENISCECTOMY LATERAL /MEDIAL (Left: Knee)    Relevant Problems   CARDIO   (+) Hyperlipidemia   (+) PAC (premature atrial contraction)      NEURO/PSYCH   (+) Anxiety        Physical Exam    Airway    Mallampati score: II  TM Distance: >3 FB  Neck ROM: full     Dental   Comment: Missing Molar,     Cardiovascular      Pulmonary      Other Findings        Anesthesia Plan  ASA Score- 2     Anesthesia Type- general with ASA Monitors  Additional Monitors:   Airway Plan: LMA  Plan Factors-Exercise tolerance (METS): >4 METS  Chart reviewed  Patient summary reviewed  Patient is not a current smoker  Patient did not smoke on day of surgery  Induction- intravenous  Postoperative Plan- Plan for postoperative opioid use  Informed Consent- Anesthetic plan and risks discussed with patient  I personally reviewed this patient with the CRNA  Discussed and agreed on the Anesthesia Plan with the CRNA  Mickie Velazquez

## 2023-01-20 ENCOUNTER — OFFICE VISIT (OUTPATIENT)
Dept: PHYSICAL THERAPY | Facility: CLINIC | Age: 53
End: 2023-01-20

## 2023-01-20 DIAGNOSIS — Z98.890 S/P MENISCECTOMY: ICD-10-CM

## 2023-01-20 DIAGNOSIS — S83.242A OTHER TEAR OF MEDIAL MENISCUS, CURRENT INJURY, LEFT KNEE, INITIAL ENCOUNTER: ICD-10-CM

## 2023-01-20 DIAGNOSIS — M25.562 LEFT KNEE PAIN, UNSPECIFIED CHRONICITY: Primary | ICD-10-CM

## 2023-01-20 NOTE — PROGRESS NOTES
PT Evaluation     Today's date: 2023  Patient name: Jodee Pascal  : 1970  MRN: [id number]  Referring provider: Jordana Quiles,*  Dx:   Encounter Diagnosis     ICD-10-CM    1  Left knee pain, unspecified chronicity  M25 562       2  S/P meniscectomy  [id number]                      Assessment  Assessment details: Pt is a 46y o  year old male that presents to outpatient physical therapy with L knee pain s/p menisectomy  Pt demonstrated decreased quad activation and strength  Pt also demonstrates limited gait mechanics due to increase pain and swelling  Pt ambulates with decreased stride length on L  Pt exhibits mild decrease of TKE on the L side with initial heel strike  An mildly antalgic gait pattern  Pt demonstrates increased pain, decreased ROM, decreased strength, decreased activity tolerance, and decreased functional activity due to pain and weakness  Pt appears motivated; HEP was given to Pt  Education was given typical physiological response following menisectomy  Pt would benefit from skilled physical therapy to address noted impairments, meet patient's goals, and to return to [facility]  Thank you for this referral     Impairments: abnormal gait, abnormal muscle firing, abnormal or restricted ROM, activity intolerance, impaired balance, impaired physical strength, lacks appropriate home exercise program and pain with function    Symptom irritability: lowUnderstanding of Dx/Px/POC: good   Prognosis: good    Goals  STGs:   1  Pt will be able to demonstrate an increase of strength by at least 1/2 grade within 4 weeks   2  Pt will be able to achieve increased ROM by at least 15% without increase of symptoms within 4 weeks  3  Pt will be able to walk independently for at least 20 minutes at least 3 0 MPH without increase of symptoms during or following activity within 4 weeks   4  Pt will demonstrate 'good' quadriceps activation on the L without cueing        LTGs:   1   Pt will be independent with all IADLs without pain or discomfort upon discharge  2  Pt will be independent with HEP upon discharge   3  Pt will be able to report no pain or discomfort with all work duties and recreational activities for a full day upon discharge  4  Pt will be able to demonstrate at least 10 cm improvement with Y-balance on the L LE to improve SL balance without increase of symptoms upon discharge    Plan  Plan details: 1-2x a week   4-6 weeks   Patient would benefit from: PT eval and skilled physical therapy  Planned modality interventions: cryotherapy, electrical stimulation/Russian stimulation, TENS, low level laser therapy, thermotherapy: hydrocollator packs, iontophoresis and unattended electrical stimulation  Planned therapy interventions: abdominal trunk stabilization, joint mobilization, manual therapy, massage, Issa taping, muscle pump exercises, neuromuscular re-education, patient education, therapeutic activities, therapeutic exercise, stretching, strengthening, home exercise program, balance, functional ROM exercises, flexibility, breathing training and activity modification  Treatment plan discussed with: patient        Subjective Evaluation    History of Present Illness  Mechanism of injury: Pt states that he had a MENISCECTOMY on 1/17/2023  Reports that his pain has been pretty well managed since surgery  Reports that he has been been using a lot of ice at home  Indicates that his leg getting more swollen after sitting at his desk for longer periods of time; is able to manage with elevating his LE  Indicates that his symptoms are primarily 'pressure' from the increased swelling with sharp shoot pain at times with starting knee flexion from extension  Denies changes in bowel/bladder  Denies saddle anesthesia   Denies numbness/tingling        AGGS: walking, stairs  EASES: rest, medication  Goals: return to running/jogging/hiking without pain       Previous maging findings: MRI - Complex tear posterior horn of the medial meniscus extending to the junction with the body with radial and horizontal components  Small baker's cyst  Pain  Current pain rating: 3  At best pain ratin  At worst pain ratin  Quality: pressure and sharp    Patient Goals  Patient goals for therapy: decreased pain, return to sport/leisure activities and decreased edema          Objective     Palpation     Additional Palpation Details  No tenderness with palpation     Neurological Testing     Sensation     Lumbar   Left   Intact: light touch    Right   Intact: light touch    Knee   Left Knee   Intact: light touch    Right Knee   Intact: light touch     Reflexes   Left   Patellar (L4): normal (2+)  Achilles (S1): normal (2+)  Clonus sign: negative    Right   Patellar (L4): normal (2+)  Achilles (S1): normal (2+)  Clonus sign: negative    Active Range of Motion     Lumbar   Normal active range of motion    Passive Range of Motion   Left Knee   Flexion: 125 degrees with pain  Prone flexion: 116 degrees with pain  Extension: 0 degrees     Additional Passive Range of Motion Details  Pt reported increase pressure/discomfort with knee flexion    Mobility   Patellar Mobility:   Left Knee   WFL: medial, lateral, superior and inferior       Strength/Myotome Testing     Left Hip   Planes of Motion   Flexion: 4  Extension: 4+  Abduction: 4  Adduction: 4-    Right Hip   Planes of Motion   Flexion: 5  Extension: 5  Abduction: 4+  Adduction: 4+    Left Knee   Flexion: 4+  Extension: 4  Quadriceps contraction: fair    Right Knee   Flexion: 4+  Extension: 4+  Quadriceps contraction: good    Left Ankle/Foot   Dorsiflexion: 5  Plantar flexion: 5    Right Ankle/Foot   Dorsiflexion: 5  Plantar flexion: 5    Swelling     Left Knee Girth Measurement (cm)   Joint line: 45 cm  10 cm below joint line: 43 cm    General Comments:      Knee Comments  Swelling:    - 20 cm below joint line - 35 00   - Around malleoli - 33 00      Gait: Pt ambulates with decreased stride length on L  Pt exhibits mild decrease of TKE on the L side with initial heel strike   An mildly antalgic gait pattern              Precautions: S/P L MENISCECTOMY    Daily Treatment Diary    Date 1/20            FOTO IE -             Re-Eval IE               Manuals    Knee PROM  JM                                                   Neuro Re-Ed                                                                                                Ther Ex    Bike - ROM             Supine heel slides 20x            Supine quad set 2x10            Supine SLR 10x            Ankle pumps 10x                                                                                          Ther Activity                              Gait Training                              Modalities

## 2023-01-25 ENCOUNTER — OFFICE VISIT (OUTPATIENT)
Dept: OBGYN CLINIC | Facility: MEDICAL CENTER | Age: 53
End: 2023-01-25

## 2023-01-25 VITALS
WEIGHT: 228 LBS | BODY MASS INDEX: 32.64 KG/M2 | HEART RATE: 70 BPM | SYSTOLIC BLOOD PRESSURE: 126 MMHG | HEIGHT: 70 IN | DIASTOLIC BLOOD PRESSURE: 87 MMHG

## 2023-01-25 DIAGNOSIS — Z98.890 S/P MEDIAL MENISCECTOMY OF LEFT KNEE: Primary | ICD-10-CM

## 2023-01-25 NOTE — PROGRESS NOTES
Knee Post Operative Visit     Assesment:     Surgical Arthroscopy of the left knee with medial meniscectomy 1/17/23    Plan:    Post-Operative treatment:    Ice to knee for 20 minutes at least 1-2 times daily  Continue PT for ROM/strengthening to knee, hip and core  OTC NSAIDS prn for pain  Gradually increase activities to tolerance  Imaging: All imaging from today was reviewed by myself and explained to the patient  Weight bearing:  as tolerated     ROM:  Full    Brace:  No brace needed    DVT Prophylaxis:  Aspirin 325 mg oral twice daily x 2 weeks    Follow up:   5 weeks    Patient was advised that if they have any fevers, chills, chest pain, shortness of breath, redness or drainage from the incision, please let our office know immediately  History of Present Illness: The patient is a 46 y o  male who is being evaluated post operatively 1 week  status post left knee medial meniscectomy 1/17/23  Since the prior visit, He reports improvement  No longer has pain he had pre op  Has mild stiffness and swelling in knee  Pain is well controlled  The patient is using ice to control swelling  They have started physical therapy  The patient has been ambulating without crutches  The patient has been ambulating without a brace  The patient denies any fevers, chills, calf pain, chest pain/shortness of breath, redness or drainage from the incision  I have reviewed the past medical, surgical, social and family history, medications and allergies as documented in the EMR  Review of systems: ROS is negative other than that noted in the HPI  Constitutional: Negative for fatigue and fever  Physical Exam:    Blood pressure 126/87, pulse 70, height 5' 10" (1 778 m), weight 103 kg (228 lb)      General/Constitutional: NAD, well developed, well nourished  HENT: Normocephalic, atraumatic  CV: Intact distal pulses, regular rate  Resp: No respiratory distress or labored breathing  Lymphatic: No lymphadenopathy palpated  Neuro: Alert and Oriented x 3, no focal deficits  Psych: Normal mood, normal affect, normal judgement, normal behavior  Skin: Warm, dry, no rashes, no erythema       Knee Exam (focused):                   RIGHT LEFT   ROM:   0-130 0-130   Palpation: Effusion negative mild     MJL tenderness Negative Negative     LJL tenderness Negative Negative   Instability: Varus stable stable     Valgus stable stable   Special Tests: Lachman Negative Negative     Posterior drawer Negative Negative     Anterior drawer Negative Negative     Pivot shift not tested not tested     Dial not tested not tested   Patella: Palpation no tenderness no tenderness     Mobility 1/4 1/4     Apprehension Negative Negative   Other: Single leg 1/4 squat not tested not tested      Incisions show no erythema, no drainage    LE NV Exam: +2 DP/PT pulses bilaterally  Sensation intact to light touch L2-S1 bilaterally     Bilateral hip ROM demonstrates no pain actively or passively    No calf tenderness to palpation bilaterally      Scribe Attestation    I,:  José Miguel Morgan am acting as a scribe while in the presence of the attending physician :       I,:  Yue Nelson DO personally performed the services described in this documentation    as scribed in my presence :

## 2023-01-27 ENCOUNTER — OFFICE VISIT (OUTPATIENT)
Dept: PHYSICAL THERAPY | Facility: CLINIC | Age: 53
End: 2023-01-27

## 2023-01-27 DIAGNOSIS — M25.562 LEFT KNEE PAIN, UNSPECIFIED CHRONICITY: Primary | ICD-10-CM

## 2023-01-27 DIAGNOSIS — M17.12 PRIMARY OSTEOARTHRITIS OF LEFT KNEE: ICD-10-CM

## 2023-01-27 DIAGNOSIS — Z98.890 S/P MENISCECTOMY: ICD-10-CM

## 2023-01-27 DIAGNOSIS — S83.242A OTHER TEAR OF MEDIAL MENISCUS, CURRENT INJURY, LEFT KNEE, INITIAL ENCOUNTER: ICD-10-CM

## 2023-01-27 NOTE — PROGRESS NOTES
Daily Note     Today's date: 2023  Patient name: Kaleigh Gillis  : 1970  MRN: [id number]  Referring provider: Marce Mosqueda,*  Dx:   Encounter Diagnosis     ICD-10-CM    1  Left knee pain, unspecified chronicity  M25 562       2  S/P meniscectomy  Z98 [id number]       3  Other tear of medial meniscus, current injury, left knee, initial encounter  S83 242A       4  Primary osteoarthritis of left knee  M17 12                      Subjective: Pt states that his knee has been feeling 'alright'  He indicates that he feels fatigued at the end of the day  He also reports that he has some discomfort when activating his quad in a rapid fashion  Objective: See treatment diary below      Assessment: Tolerated treatment well  Activities were performed to improve quad strength in standing, seated and functional positions  Pt reported that his knee felt 'weaker'; denied pain throughout session  Slight fear avoidance with more dynamic strengthening activities  Recommended to continue to increase activities and exercise at home within tolerance  Patient demonstrated fatigue post treatment and would benefit from continued PT      Plan: Continue per plan of care        Precautions: S/P L MENISCECTOMY    Daily Treatment Diary    Date            FOTO IE -             Re-Eval IE               Manuals    Knee PROM  JM                                                   Neuro Re-Ed                                            Ther Ex    Bike - ROM  TM - 4 min 2 0 mph           Supine SAQ  2x5# 3x10           Side lying hip abd  3x10 2 5#           LAQ w/ NMES  2s ramp  5s on  10s off  5 minutes           Leg press  (B) 75# 2x10    SL 45# 2x10           Step up w/ TKE on zora  9# 3x8                                                   Ther Activity    Step ups  2x10 6" step           Eccentric step down  4" step 2x10                                     Gait Training                              Modalities

## 2023-02-02 ENCOUNTER — APPOINTMENT (OUTPATIENT)
Dept: PHYSICAL THERAPY | Facility: CLINIC | Age: 53
End: 2023-02-02

## 2023-02-10 ENCOUNTER — OFFICE VISIT (OUTPATIENT)
Dept: PHYSICAL THERAPY | Facility: CLINIC | Age: 53
End: 2023-02-10

## 2023-02-10 DIAGNOSIS — M25.562 LEFT KNEE PAIN, UNSPECIFIED CHRONICITY: Primary | ICD-10-CM

## 2023-02-10 DIAGNOSIS — Z98.890 S/P MENISCECTOMY: ICD-10-CM

## 2023-02-10 DIAGNOSIS — M17.12 PRIMARY OSTEOARTHRITIS OF LEFT KNEE: ICD-10-CM

## 2023-02-10 DIAGNOSIS — S83.242A OTHER TEAR OF MEDIAL MENISCUS, CURRENT INJURY, LEFT KNEE, INITIAL ENCOUNTER: ICD-10-CM

## 2023-02-10 NOTE — PROGRESS NOTES
Daily Note + Discharge Note     Today's date: 2/10/2023  Patient name: Ruddy Dutton  : 1970  MRN: [id number]  Referring provider: Madeleine Scott  Dx:   Encounter Diagnosis     ICD-10-CM    1  Left knee pain, unspecified chronicity  M25 562       2  S/P meniscectomy  Z98 890       3  Other tear of medial meniscus, current injury, left knee, initial encounter  S83 242A       4  Primary osteoarthritis of left knee  M17 12                      Subjective: Pt states that he is doing well  Reports that he still gets some stiffness at the incision on the lateral aspect  Feels that he can continue with HEP and make today his last day of physical therapy  Objective: See treatment diary below      Assessment: Tolerated treatment well  Activities were performed to improve knee strength, specifically the L quads  Pt was able to demonstrate good eccentric control with step down from 6" step without UE support  Able to jog without any discomfort  Reported that his knee felt a little off, but was not uncomfortable  Patient demonstrated fatigue post treatment; Plan to discharge Pt today from formal physical therapy due to meeting personal and LTGs  Plan: Advised patient to continue with home exercise program which I reviewed with them today  Advised patient to contact me with any future questions or concerns regarding exercise  Pt is in agreement with discharge plan         Precautions: S/P L MENISCECTOMY    Daily Treatment Diary    Date 1/20 1/27 2/10          FOTO IE -             Re-Eval IE               Manuals    Knee PROM  JM  JM                                                 Neuro Re-Ed                                            Ther Ex    Bike - ROM  TM - 4 min 2 0 mph TM - 3 min 2 0 mph          Heel raises   Holding 10#s 3x10           Side lying hip abd  3x10 2 5# 3x10 1 5#          LAQ w/ NMES  2s ramp  5s on  10s off  5 minutes No NMES 3x10 4#          Leg press  (B) 75# 2x10    SL 45# 2x10 SL 55# 3x10          Mini squats   On bosu 3x10           SLR    1 5# 3x10                                     Ther Activity    Step ups  2x10 6" step 2x10 6" step    10x 8"           Eccentric step down  4" step 2x10 On 8" step 2x12          Sit to stand    Holding 10#s 2x10                        Gait Training                              Modalities

## 2023-04-05 ENCOUNTER — RA CDI HCC (OUTPATIENT)
Dept: OTHER | Facility: HOSPITAL | Age: 53
End: 2023-04-05

## 2023-04-05 NOTE — PROGRESS NOTES
[facility]  coding opportunities       Chart reviewed, no opportunity found: CHART REVIEWED, NO OPPORTUNITY FOUND        Patients Insurance        Commercial Insurance: [address] [place] [address]

## 2023-05-16 ENCOUNTER — APPOINTMENT (OUTPATIENT)
Dept: LAB | Facility: MEDICAL CENTER | Age: 53
End: 2023-05-16

## 2023-05-16 ENCOUNTER — OFFICE VISIT (OUTPATIENT)
Dept: FAMILY MEDICINE CLINIC | Facility: CLINIC | Age: 53
End: 2023-05-16

## 2023-05-16 VITALS
HEIGHT: 70 IN | TEMPERATURE: 97.6 F | DIASTOLIC BLOOD PRESSURE: 96 MMHG | BODY MASS INDEX: 35.3 KG/M2 | WEIGHT: 246.6 LBS | HEART RATE: 67 BPM | SYSTOLIC BLOOD PRESSURE: 126 MMHG | RESPIRATION RATE: 16 BRPM

## 2023-05-16 DIAGNOSIS — L03.031 CELLULITIS OF TOE OF RIGHT FOOT: Primary | ICD-10-CM

## 2023-05-16 DIAGNOSIS — L03.031 CELLULITIS OF TOE OF RIGHT FOOT: ICD-10-CM

## 2023-05-16 PROBLEM — L03.90 CELLULITIS: Status: ACTIVE | Noted: 2023-05-16

## 2023-05-16 LAB — URATE SERPL-MCNC: 6.3 MG/DL (ref 3.5–8.5)

## 2023-05-16 RX ORDER — SULFAMETHOXAZOLE AND TRIMETHOPRIM 800; 160 MG/1; MG/1
1 TABLET ORAL EVERY 12 HOURS SCHEDULED
Qty: 20 TABLET | Refills: 0 | Status: SHIPPED | OUTPATIENT
Start: 2023-05-16 | End: 2023-05-26

## 2023-05-16 RX ORDER — INDOMETHACIN 50 MG/1
50 CAPSULE ORAL 3 TIMES DAILY PRN
Qty: 30 CAPSULE | Refills: 0 | Status: SHIPPED | OUTPATIENT
Start: 2023-05-16

## 2023-05-16 NOTE — ASSESSMENT & PLAN NOTE
Bactrim DS as he is allergic to penicillins  Where the pain was initially was more towards the tip of the toe, so unlikely to be gout  We will check uric acid level  For the pain level that he has, which is an unrelenting discomfort, would recommend Indocin, 50 mg 3 times daily as needed  Reviewed about GI distress with this  Tylenol could certainly also be used if needed

## 2023-05-16 NOTE — PROGRESS NOTES
Name: Fabiola Echevarria      : 1970      MRN: [id number]  Encounter Provider: Matt Cortes MD  Encounter Date: 2023   Encounter department: [place] [address]     1  Cellulitis of toe of right foot  Assessment & Plan:  Bactrim DS as he is allergic to penicillins  Where the pain was initially was more towards the tip of the toe, so unlikely to be gout  We will check uric acid level  For the pain level that he has, which is an unrelenting discomfort, would recommend Indocin, 50 mg 3 times daily as needed  Reviewed about GI distress with this  Tylenol could certainly also be used if needed  Orders:  -     sulfamethoxazole-trimethoprim (Bactrim DS) 800-160 mg per tablet; Take 1 tablet by mouth every 12 (twelve) hours for 10 days  -     Uric acid; Future  -     indomethacin (INDOCIN) 50 mg capsule; Take 1 capsule (50 mg total) by mouth 3 (three) times a day as needed for mild pain or moderate pain        Depression Screening and Follow-up Plan: Patient was screened for depression during today's encounter  They screened negative with a PHQ-2 score of 0  Subjective      Chief Complaint   Patient presents with   • Toe Pain     Patient in office for possible left toe infection currents symptoms pain, swelling and redness, no hx of injury or insect bite ongoing since Friday  Swelling of right foot  Started Friday  Noted like something was rubbing, but no blisters  Now, swelling of 1st toe, and foot  Redness on the toe as well  Tried Z francesac, but no help  Review of Systems   Constitutional: Negative for chills and fever  HENT: Negative for ear pain and sore throat  Eyes: Negative for pain and visual disturbance  Respiratory: Negative for cough and shortness of breath  Cardiovascular: Negative for chest pain and palpitations  Gastrointestinal: Negative for abdominal pain and vomiting     Genitourinary: Negative for dysuria and "hematuria  Musculoskeletal: Negative for arthralgias and back pain  Left toe swelling, pain and red   Skin: Negative for color change and rash  Neurological: Negative for seizures and syncope  All other systems reviewed and are negative  Current Outpatient Medications on File Prior to Visit   Medication Sig   • escitalopram (LEXAPRO) 20 mg tablet TAKE 1 TABLET DAILY   • Multiple Vitamin (multivitamin) tablet Take 1 tablet by mouth daily   • rosuvastatin (CRESTOR) 10 MG tablet TAKE 1 TABLET DAILY   • [DISCONTINUED] Acetaminophen (TYLENOL 8 HOUR PO) Take by mouth As needed   • [DISCONTINUED] aspirin (ECOTRIN) 325 mg EC tablet Take 1 tablet (325 mg total) by mouth daily   • [DISCONTINUED] COVID-19 Antibody Test KIT Test 1 swab daily as needed (viral illness) (Patient not taking: Reported on 1/25/2023)   • [DISCONTINUED] diclofenac (VOLTAREN) 75 mg EC tablet Take 1 tablet (75 mg total) by mouth 2 (two) times a day as needed (pain) Take with food  (Patient not taking: Reported on 1/25/2023)   • [DISCONTINUED] oxyCODONE (ROXICODONE) 5 immediate release tablet Take 1 tablet (5 mg total) by mouth every 4 (four) hours as needed for moderate pain for up to 15 doses Max Daily Amount: 30 mg (Patient not taking: Reported on 1/25/2023)       Objective     /96 (BP Location: Right arm, Patient Position: Sitting, Cuff Size: Adult)   Pulse 67   Temp 97 6 °F (36 4 °C) (Temporal)   Resp 16   Ht 5' 10\" (1 778 m)   Wt 112 kg (246 lb 9 6 oz)   BMI 35 38 kg/m²     Physical Exam  Vitals and nursing note reviewed     Musculoskeletal:        Feet:        Tracy Duran MD  "

## 2023-05-16 NOTE — PATIENT INSTRUCTIONS
1  Cellulitis of toe of right foot  Assessment & Plan:  Bactrim DS as he is allergic to penicillins  Where the pain was initially was more towards the tip of the toe, so unlikely to be gout  We will check uric acid level  For the pain level that he has, which is an unrelenting discomfort, would recommend Indocin, 50 mg 3 times daily as needed  Reviewed about GI distress with this  Tylenol could certainly also be used if needed  Orders:  -     sulfamethoxazole-trimethoprim (Bactrim DS) 800-160 mg per tablet; Take 1 tablet by mouth every 12 (twelve) hours for 10 days  -     Uric acid; Future  -     indomethacin (INDOCIN) 50 mg capsule; Take 1 capsule (50 mg total) by mouth 3 (three) times a day as needed for mild pain or moderate pain        COVID 19 Instructions    Ioana Connor was advised to limit contact with others to essential tasks such as getting food, medications, and medical care  Proper handwashing reviewed, and Hand sanitzer when washing is not available  If the patient develops symptoms of COVID 19, the patient should call the office as soon as possible  For 5208-4485 Flu season, it is strongly recommended that Flu Vaccinations be obtained  Virtual Visits:  Mojgan: This works on smart phones (any phone with Internet browsing capability)  You should get a text message when the provider is ready to see you  Click on the link in the text message, and the call should start  You will need to type in your name, and allow camera and microphone access  This is HIPPA compliant, and secure  If you have not already done so, get immunized to COVID 19  We are committed to getting you vaccinated as soon as possible and will be closely following CDC and [facility]  guidelines as they are released and revised  Please refer to our COVID-19 vaccine webpage for the most up to date information on the vaccine and our distribution efforts      This site will also have the most up to date recommendations for COVID booster vaccine  Zeenat moore    Call [phone] ([phone]), option 7    OUR NEW LOCATION:    [facility]  [address], [address]  [place], [place], [address]  Fax: [phone]    Lab services and OB/GYN are at this location as well

## 2023-08-07 ENCOUNTER — APPOINTMENT (OUTPATIENT)
Dept: LAB | Facility: MEDICAL CENTER | Age: 53
End: 2023-08-07
Payer: COMMERCIAL

## 2023-08-07 DIAGNOSIS — Z12.5 PROSTATE CANCER SCREENING: ICD-10-CM

## 2023-08-07 DIAGNOSIS — E78.2 MIXED HYPERLIPIDEMIA: ICD-10-CM

## 2023-08-07 LAB
ALBUMIN SERPL BCP-MCNC: 4.1 G/DL (ref 3.5–5)
ALP SERPL-CCNC: 45 U/L (ref 46–116)
ALT SERPL W P-5'-P-CCNC: 27 U/L (ref 12–78)
ANION GAP SERPL CALCULATED.3IONS-SCNC: 3 MMOL/L
AST SERPL W P-5'-P-CCNC: 26 U/L (ref 5–45)
BILIRUB SERPL-MCNC: 0.98 MG/DL (ref 0.2–1)
BUN SERPL-MCNC: 16 MG/DL (ref 5–25)
CALCIUM SERPL-MCNC: 8.8 MG/DL (ref 8.3–10.1)
CHLORIDE SERPL-SCNC: 112 MMOL/L (ref 96–108)
CHOLEST SERPL-MCNC: 176 MG/DL
CO2 SERPL-SCNC: 26 MMOL/L (ref 21–32)
CREAT SERPL-MCNC: 1.04 MG/DL (ref 0.6–1.3)
GFR SERPL CREATININE-BSD FRML MDRD: 81 ML/MIN/1.73SQ M
GLUCOSE P FAST SERPL-MCNC: 104 MG/DL (ref 65–99)
HDLC SERPL-MCNC: 74 MG/DL
LDLC SERPL CALC-MCNC: 83 MG/DL (ref 0–100)
NONHDLC SERPL-MCNC: 102 MG/DL
POTASSIUM SERPL-SCNC: 4.8 MMOL/L (ref 3.5–5.3)
PROT SERPL-MCNC: 7.4 G/DL (ref 6.4–8.4)
PSA SERPL-MCNC: 1.37 NG/ML (ref 0–4)
SODIUM SERPL-SCNC: 141 MMOL/L (ref 135–147)
TRIGL SERPL-MCNC: 96 MG/DL

## 2023-08-07 PROCEDURE — G0103 PSA SCREENING: HCPCS

## 2023-08-07 PROCEDURE — 80053 COMPREHEN METABOLIC PANEL: CPT

## 2023-08-07 PROCEDURE — 80061 LIPID PANEL: CPT

## 2023-08-07 PROCEDURE — 36415 COLL VENOUS BLD VENIPUNCTURE: CPT

## 2023-08-18 ENCOUNTER — OFFICE VISIT (OUTPATIENT)
Dept: FAMILY MEDICINE CLINIC | Facility: CLINIC | Age: 53
End: 2023-08-18
Payer: COMMERCIAL

## 2023-08-18 VITALS
HEART RATE: 74 BPM | WEIGHT: 255.25 LBS | BODY MASS INDEX: 36.62 KG/M2 | OXYGEN SATURATION: 97 % | TEMPERATURE: 97.8 F | DIASTOLIC BLOOD PRESSURE: 76 MMHG | SYSTOLIC BLOOD PRESSURE: 118 MMHG

## 2023-08-18 DIAGNOSIS — E78.2 MIXED HYPERLIPIDEMIA: Primary | ICD-10-CM

## 2023-08-18 DIAGNOSIS — Z12.5 PROSTATE CANCER SCREENING: ICD-10-CM

## 2023-08-18 DIAGNOSIS — R73.9 HYPERGLYCEMIA: ICD-10-CM

## 2023-08-18 DIAGNOSIS — L03.031 CELLULITIS OF TOE OF RIGHT FOOT: ICD-10-CM

## 2023-08-18 DIAGNOSIS — E83.51 HYPOCALCEMIA: ICD-10-CM

## 2023-08-18 PROBLEM — L03.90 CELLULITIS: Status: RESOLVED | Noted: 2023-05-16 | Resolved: 2023-08-18

## 2023-08-18 PROCEDURE — 99214 OFFICE O/P EST MOD 30 MIN: CPT | Performed by: FAMILY MEDICINE

## 2023-08-18 NOTE — PROGRESS NOTES
Name: Lonnie Moreland      : 1970      MRN: [id number]  Encounter Provider: Salvatore Batista MD  Encounter Date: 2023   Encounter department: [place] [address] [place] [facility]     1. Mixed hyperlipidemia  Assessment & Plan:  Has been OK. LDL better than before. Continue Crestor. No changes. Orders:  -     Comprehensive metabolic panel; Future; Expected date: 2024  -     Lipid panel; Future; Expected date: 2024    2. Hyperglycemia  Assessment & Plan:  Stable. Limit carbs. Follow in 6 months. Orders:  -     Comprehensive metabolic panel; Future; Expected date: 2024    3. Hypocalcemia  Assessment & Plan:  Calcium level was normal.      4. Prostate cancer screening  Comments:  Stable. Check in 1 year. Reviewed about not doing the EKATERINA now. No symptoms. Orders:  -     PSA, Total Screen; Future; Expected date: 2024    5. Cellulitis of toe of right foot  Comments:  Noted before. It is better now. Recheck Uric Acid in 6 months. Orders:  -     Uric acid; Future; Expected date: 2024           Subjective      Chief Complaint   Patient presents with   • Follow-up     Discuss lab results. mgb       Here to follow-up on multiple issues. Currently on Crestor for cholesterol. Reviewed labs. Review of Systems   Constitutional: Negative. HENT: Negative. Eyes: Negative. Respiratory: Negative. Cardiovascular: Negative. Gastrointestinal: Negative. Endocrine: Negative. Genitourinary: Negative. Musculoskeletal: Negative. Skin: Negative. Allergic/Immunologic: Negative. Neurological: Negative. Hematological: Negative. Psychiatric/Behavioral: Negative.         Current Outpatient Medications on File Prior to Visit   Medication Sig   • Multiple Vitamin (multivitamin) tablet Take 1 tablet by mouth daily   • rosuvastatin (CRESTOR) 10 MG tablet TAKE 1 TABLET DAILY   • [DISCONTINUED] escitalopram (LEXAPRO) 20 mg tablet TAKE 1 TABLET DAILY (Patient not taking: Reported on 8/18/2023)   • [DISCONTINUED] indomethacin (INDOCIN) 50 mg capsule Take 1 capsule (50 mg total) by mouth 3 (three) times a day as needed for mild pain or moderate pain (Patient not taking: Reported on 8/18/2023)       Objective     Sodium 141, potassium 4.8, calcium 8.8. Blood sugar 104. AST 26, ALT 27. Creatinine 1.04, GFR 81. Total cholesterol 176, LDL 83, HDL 74, triglycerides 96. PSA 1.37. Prior was 1.2.    /76 (BP Location: Left arm, Patient Position: Sitting, Cuff Size: Large)   Pulse 74   Temp 97.8 °F (36.6 °C) (Temporal)   Wt 116 kg (255 lb 4 oz)   SpO2 97%   BMI 36.62 kg/m²     Physical Exam  Vitals and nursing note reviewed. Constitutional:       Appearance: He is well-developed. HENT:      Head: Normocephalic and atraumatic. Cardiovascular:      Rate and Rhythm: Normal rate and regular rhythm. Pulses:           Carotid pulses are 2+ on the right side and 2+ on the left side. Heart sounds: Normal heart sounds. No murmur heard. No friction rub. No gallop. Pulmonary:      Effort: Pulmonary effort is normal. No respiratory distress. Breath sounds: Normal breath sounds. No wheezing or rales. Musculoskeletal:      Cervical back: Normal range of motion and neck supple.        Jose Luis Anne MD

## 2023-08-18 NOTE — PATIENT INSTRUCTIONS
1. Mixed hyperlipidemia  Assessment & Plan:  Has been OK. LDL better than before. Continue Crestor. No changes. Orders:  -     Comprehensive metabolic panel; Future; Expected date: 02/18/2024  -     Lipid panel; Future; Expected date: 02/18/2024    2. Hyperglycemia  Assessment & Plan:  Stable. Limit carbs. Follow in 6 months. Orders:  -     Comprehensive metabolic panel; Future; Expected date: 02/18/2024    3. Hypocalcemia  Assessment & Plan:  Calcium level was normal.      4. Prostate cancer screening  Comments:  Stable. Check in 1 year. Reviewed about not doing the EKATERINA now. No symptoms. Orders:  -     PSA, Total Screen; Future; Expected date: 08/18/2024    5. Cellulitis of toe of right foot  Comments:  Noted before. It is better now. Recheck Uric Acid in 6 months. Orders:  -     Uric acid; Future; Expected date: 02/18/2024        COVID 19 Instructions    Sharri Greene was advised to limit contact with others to essential tasks such as getting food, medications, and medical care. Proper handwashing reviewed, and Hand sanitzer when washing is not available. If the patient develops symptoms of COVID 19, the patient should call the office as soon as possible. For 2631-8412 Flu season, it is strongly recommended that Flu Vaccinations be obtained. Virtual Visits:  Mojgan: This works on smart phones (any phone with Internet browsing capability). You should get a text message when the provider is ready to see you. Click on the link in the text message, and the call should start. You will need to type in your name, and allow camera and microphone access. This is HIPPA compliant, and secure. If you have not already done so, get immunized to COVID 19. We are committed to getting you vaccinated as soon as possible and will be closely following CDC and [facility] guidelines as they are released and revised.   Please refer to our COVID-19 vaccine webpage for the most up to date information on the vaccine and our distribution efforts. This site will also have the most up to date recommendations for COVID booster vaccine. Nava    Call [phone] ([phone]), option 7    OUR NEW LOCATION:    [facility]  [address] [facility], [address] [place] [address], [address] [facility]  Fax: [phone]    Lab services and OB/GYN are at this location as well.

## 2023-11-22 DIAGNOSIS — E78.2 MIXED HYPERLIPIDEMIA: ICD-10-CM

## 2023-11-22 RX ORDER — ROSUVASTATIN CALCIUM 10 MG/1
TABLET, COATED ORAL
Qty: 90 TABLET | Refills: 3 | Status: SHIPPED | OUTPATIENT
Start: 2023-11-22

## 2024-02-28 DIAGNOSIS — U07.1 COVID-19 VIRUS INFECTION: Primary | ICD-10-CM

## 2024-02-28 DIAGNOSIS — U07.1 COVID-19 VIRUS INFECTION: ICD-10-CM

## 2024-02-28 RX ORDER — NIRMATRELVIR AND RITONAVIR 300-100 MG
3 KIT ORAL 2 TIMES DAILY
Qty: 30 TABLET | Refills: 0 | Status: SHIPPED | OUTPATIENT
Start: 2024-02-28 | End: 2024-02-28 | Stop reason: SDUPTHER

## 2024-02-28 RX ORDER — NIRMATRELVIR AND RITONAVIR 300-100 MG
3 KIT ORAL 2 TIMES DAILY
Qty: 30 TABLET | Refills: 0 | Status: SHIPPED | OUTPATIENT
Start: 2024-02-28 | End: 2024-03-04

## 2024-03-27 ENCOUNTER — APPOINTMENT (OUTPATIENT)
Dept: LAB | Facility: MEDICAL CENTER | Age: 54
End: 2024-03-27
Payer: COMMERCIAL

## 2024-03-27 DIAGNOSIS — R73.9 HYPERGLYCEMIA: ICD-10-CM

## 2024-03-27 DIAGNOSIS — Z12.5 PROSTATE CANCER SCREENING: ICD-10-CM

## 2024-03-27 DIAGNOSIS — E78.2 MIXED HYPERLIPIDEMIA: ICD-10-CM

## 2024-03-27 DIAGNOSIS — L03.031 CELLULITIS OF TOE OF RIGHT FOOT: ICD-10-CM

## 2024-03-27 LAB
ALBUMIN SERPL BCP-MCNC: 4.3 G/DL (ref 3.5–5)
ALP SERPL-CCNC: 41 U/L (ref 34–104)
ALT SERPL W P-5'-P-CCNC: 25 U/L (ref 7–52)
ANION GAP SERPL CALCULATED.3IONS-SCNC: 10 MMOL/L (ref 4–13)
AST SERPL W P-5'-P-CCNC: 36 U/L (ref 13–39)
BILIRUB SERPL-MCNC: 1.06 MG/DL (ref 0.2–1)
BUN SERPL-MCNC: 11 MG/DL (ref 5–25)
CALCIUM SERPL-MCNC: 8.8 MG/DL (ref 8.4–10.2)
CHLORIDE SERPL-SCNC: 103 MMOL/L (ref 96–108)
CHOLEST SERPL-MCNC: 161 MG/DL
CO2 SERPL-SCNC: 27 MMOL/L (ref 21–32)
CREAT SERPL-MCNC: 0.92 MG/DL (ref 0.6–1.3)
GFR SERPL CREATININE-BSD FRML MDRD: 94 ML/MIN/1.73SQ M
GLUCOSE P FAST SERPL-MCNC: 114 MG/DL (ref 65–99)
HDLC SERPL-MCNC: 74 MG/DL
LDLC SERPL CALC-MCNC: 62 MG/DL (ref 0–100)
NONHDLC SERPL-MCNC: 87 MG/DL
POTASSIUM SERPL-SCNC: 4.6 MMOL/L (ref 3.5–5.3)
PROT SERPL-MCNC: 6.5 G/DL (ref 6.4–8.4)
SODIUM SERPL-SCNC: 140 MMOL/L (ref 135–147)
TRIGL SERPL-MCNC: 127 MG/DL
URATE SERPL-MCNC: 7.5 MG/DL (ref 3.5–8.5)

## 2024-03-27 PROCEDURE — 80053 COMPREHEN METABOLIC PANEL: CPT

## 2024-03-27 PROCEDURE — 80061 LIPID PANEL: CPT

## 2024-03-27 PROCEDURE — 36415 COLL VENOUS BLD VENIPUNCTURE: CPT

## 2024-03-27 PROCEDURE — 84550 ASSAY OF BLOOD/URIC ACID: CPT

## 2024-04-25 ENCOUNTER — OFFICE VISIT (OUTPATIENT)
Dept: FAMILY MEDICINE CLINIC | Facility: CLINIC | Age: 54
End: 2024-04-25
Payer: COMMERCIAL

## 2024-04-25 VITALS
HEIGHT: 70 IN | BODY MASS INDEX: 33.64 KG/M2 | OXYGEN SATURATION: 96 % | DIASTOLIC BLOOD PRESSURE: 68 MMHG | HEART RATE: 87 BPM | TEMPERATURE: 97.2 F | WEIGHT: 235 LBS | SYSTOLIC BLOOD PRESSURE: 122 MMHG

## 2024-04-25 DIAGNOSIS — E78.2 MIXED HYPERLIPIDEMIA: Primary | ICD-10-CM

## 2024-04-25 DIAGNOSIS — M10.9 ACUTE GOUT INVOLVING TOE, UNSPECIFIED CAUSE, UNSPECIFIED LATERALITY: ICD-10-CM

## 2024-04-25 DIAGNOSIS — R73.9 HYPERGLYCEMIA: ICD-10-CM

## 2024-04-25 DIAGNOSIS — R05.1 ACUTE COUGH: ICD-10-CM

## 2024-04-25 PROBLEM — S83.242A ACUTE MEDIAL MENISCUS TEAR OF LEFT KNEE: Status: RESOLVED | Noted: 2023-01-11 | Resolved: 2024-04-25

## 2024-04-25 PROCEDURE — 99214 OFFICE O/P EST MOD 30 MIN: CPT | Performed by: FAMILY MEDICINE

## 2024-04-25 RX ORDER — IPRATROPIUM BROMIDE 42 UG/1
2 SPRAY, METERED NASAL 4 TIMES DAILY
Qty: 15 ML | Refills: 3 | Status: SHIPPED | OUTPATIENT
Start: 2024-04-25

## 2024-04-25 RX ORDER — COLCHICINE 0.6 MG/1
TABLET ORAL
Qty: 30 TABLET | Refills: 5 | Status: SHIPPED | OUTPATIENT
Start: 2024-04-25

## 2024-04-25 NOTE — PROGRESS NOTES
Name: Gil Mcdonald      : 1970      MRN: [id number]  Encounter Provider: Rocky Price MD  Encounter Date: 2024   Encounter department: [facility] PRIMARY CARE    Assessment & Plan     1. Mixed hyperlipidemia  Assessment & Plan:  Cholesterol doing quite well.  Continue Crestor without change.  Check in 6 months.    Orders:  -     Comprehensive metabolic panel; Future; Expected date: 10/25/2024  -     Lipid panel; Future; Expected date: 10/25/2024    2. Hyperglycemia  Assessment & Plan:  Sugar is slightly elevated.  Limit carbohydrates a bit.  Increase exercise.    Orders:  -     Comprehensive metabolic panel; Future; Expected date: 10/25/2024    3. Acute gout involving toe, unspecified cause, unspecified laterality  Assessment & Plan:  Patient did have an episode of gout recently on vacation.  Uric acid currently is stable.  Not having problems right now.  Can give him prescription for colchicine that he could have on hand in case it would recur at some point.    Orders:  -     colchicine (COLCRYS) 0.6 mg tablet; Take 1 po q 1 hour PRN till pain relief or diarrhea  -     Uric acid; Future; Expected date: 10/25/2024    4. Acute cough  Comments:  Atrovent nasal spray, especially in the morning.  Could add later on if increased nasal discharge and coughing.  Orders:  -     ipratropium (ATROVENT) 0.06 % nasal spray; 2 sprays into each nostril 4 (four) times a day           Subjective      Chief Complaint   Patient presents with   • Follow-up     Patient present today for a follow up on his lab results.   • Cough     Pt complaints of persistent cough for the past week, and morning runny nose.       Patient is here to follow-up on multiple issues.    He has also had a persistent cough for the last week or so.  Some irritation in the morning.  No real postnasal drip throughout the rest of the day.  No face or tooth pain.  Cough comes and goes.  Not using any specific medication for it at  "this point.  Sometimes water will help.          Review of Systems   Constitutional: Negative.    HENT:  Positive for congestion, postnasal drip and rhinorrhea. Negative for dental problem, sinus pressure, sinus pain and sore throat.    Eyes: Negative.    Respiratory:  Positive for cough. Negative for shortness of breath and wheezing.    Cardiovascular: Negative.    Gastrointestinal: Negative.    Endocrine: Negative.    Genitourinary: Negative.    Musculoskeletal: Negative.    Skin: Negative.    Allergic/Immunologic: Negative.    Neurological: Negative.    Hematological: Negative.    Psychiatric/Behavioral: Negative.         Current Outpatient Medications on File Prior to Visit   Medication Sig   • Multiple Vitamin (multivitamin) tablet Take 1 tablet by mouth daily   • rosuvastatin (CRESTOR) 10 MG tablet TAKE 1 TABLET DAILY       Objective     Uric acid 7.5.  Sodium 140, potassium 4.6, calcium 8.8.  Blood sugar 114.  Creatinine 0.92, GFR 94.  AST 36, ALT 25.  Total cholesterol 161, LDL 62, HDL 74, triglycerides 127.    /68 (BP Location: Left arm, Patient Position: Sitting, Cuff Size: Large)   Pulse 87   Temp (!) 97.2 °F (36.2 °C) (Tympanic)   Ht 5' 10\" (1.778 m)   Wt 107 kg (235 lb)   SpO2 96%   BMI 33.72 kg/m²     Physical Exam  Vitals and nursing note reviewed.   Constitutional:       Appearance: Normal appearance. He is well-developed.   HENT:      Head: Normocephalic and atraumatic.   Cardiovascular:      Rate and Rhythm: Normal rate and regular rhythm.      Pulses:           Carotid pulses are 2+ on the right side and 2+ on the left side.     Heart sounds: Normal heart sounds. No murmur heard.     No friction rub. No gallop.   Pulmonary:      Effort: Pulmonary effort is normal. No respiratory distress.      Breath sounds: Normal breath sounds. No wheezing or rales.   Musculoskeletal:      Cervical back: Normal range of motion and neck supple.   Neurological:      Mental Status: He is alert. "       Rocky Price MD

## 2024-04-25 NOTE — ASSESSMENT & PLAN NOTE
Patient did have an episode of gout recently on vacation.  Uric acid currently is stable.  Not having problems right now.  Can give him prescription for colchicine that he could have on hand in case it would recur at some point.

## 2024-04-25 NOTE — PATIENT INSTRUCTIONS
1. Mixed hyperlipidemia  Assessment & Plan:  Cholesterol doing quite well.  Continue Crestor without change.  Check in 6 months.    Orders:  -     Comprehensive metabolic panel; Future; Expected date: 10/25/2024  -     Lipid panel; Future; Expected date: 10/25/2024    2. Hyperglycemia  Assessment & Plan:  Sugar is slightly elevated.  Limit carbohydrates a bit.  Increase exercise.    Orders:  -     Comprehensive metabolic panel; Future; Expected date: 10/25/2024    3. Acute gout involving toe, unspecified cause, unspecified laterality  Assessment & Plan:  Patient did have an episode of gout recently on vacation.  Uric acid currently is stable.  Not having problems right now.  Can give him prescription for colchicine that he could have on hand in case it would recur at some point.    Orders:  -     colchicine (COLCRYS) 0.6 mg tablet; Take 1 po q 1 hour PRN till pain relief or diarrhea  -     Uric acid; Future; Expected date: 10/25/2024    4. Acute cough  Comments:  Atrovent nasal spray, especially in the morning.  Could add later on if increased nasal discharge and coughing.  Orders:  -     ipratropium (ATROVENT) 0.06 % nasal spray; 2 sprays into each nostril 4 (four) times a day        COVID 19 Instructions    Gil Mcdonald was advised to limit contact with others to essential tasks such as getting food, medications, and medical care.    Proper handwashing reviewed, and Hand sanitzer when washing is not available.    If the patient develops symptoms of COVID 19, the patient should call the office as soon as possible.    It is strongly recommended that Flu Vaccinations be obtained.      Virtual Visits:  Mojgan: This works on smart phones (any phone with Internet browsing capability).  You should get a text message when the provider is ready to see you.  Click on the link in the text message, and the call should start.  You will need to type in your name, and allow camera and microphone access.  This is HIPPA  compliant, and secure.      If you have not already done so, get immunized to COVID 19.      We are committed to getting you vaccinated as soon as possible and will be closely following [facility] and [facility] guidelines as they are released and revised.  Please refer to our COVID-19 vaccine webpage for the most up to date information on the vaccine and our distribution efforts.    This site will also have the most up to date recommendations for COVID booster vaccine.    [email or web address]    Call [phone] ([phone]), option 7    You can also visit https://www.vaccines.gov/ to find vaccines in your area.    OUR LOCATION:    [facility]  [address] [place] [address], [facility] [address]  [place], [place], [address] [phone]  Fax: [phone]    Lab services, Rheumatology, and OB/GYN are at this location as well.

## 2024-06-13 DIAGNOSIS — M25.561 RECURRENT PAIN OF RIGHT KNEE: Primary | ICD-10-CM

## 2024-06-14 ENCOUNTER — APPOINTMENT (OUTPATIENT)
Dept: RADIOLOGY | Facility: MEDICAL CENTER | Age: 54
End: 2024-06-14
Payer: COMMERCIAL

## 2024-06-14 DIAGNOSIS — M25.561 RECURRENT PAIN OF RIGHT KNEE: Primary | ICD-10-CM

## 2024-06-14 DIAGNOSIS — M25.561 RECURRENT PAIN OF RIGHT KNEE: ICD-10-CM

## 2024-06-14 PROCEDURE — 73564 X-RAY EXAM KNEE 4 OR MORE: CPT

## 2024-06-19 ENCOUNTER — OFFICE VISIT (OUTPATIENT)
Dept: OBGYN CLINIC | Facility: MEDICAL CENTER | Age: 54
End: 2024-06-19
Payer: COMMERCIAL

## 2024-06-19 VITALS
WEIGHT: 216 LBS | HEIGHT: 70 IN | DIASTOLIC BLOOD PRESSURE: 87 MMHG | HEART RATE: 77 BPM | SYSTOLIC BLOOD PRESSURE: 124 MMHG | BODY MASS INDEX: 30.92 KG/M2

## 2024-06-19 DIAGNOSIS — M23.91 INTERNAL DERANGEMENT OF RIGHT KNEE: Primary | ICD-10-CM

## 2024-06-19 DIAGNOSIS — M25.561 RECURRENT PAIN OF RIGHT KNEE: ICD-10-CM

## 2024-06-19 PROCEDURE — 20610 DRAIN/INJ JOINT/BURSA W/O US: CPT | Performed by: ORTHOPAEDIC SURGERY

## 2024-06-19 PROCEDURE — 99214 OFFICE O/P EST MOD 30 MIN: CPT | Performed by: ORTHOPAEDIC SURGERY

## 2024-06-19 RX ORDER — BETAMETHASONE SODIUM PHOSPHATE AND BETAMETHASONE ACETATE 3; 3 MG/ML; MG/ML
6 INJECTION, SUSPENSION INTRA-ARTICULAR; INTRALESIONAL; INTRAMUSCULAR; SOFT TISSUE
Status: COMPLETED | OUTPATIENT
Start: 2024-06-19 | End: 2024-06-19

## 2024-06-19 RX ORDER — LIDOCAINE HYDROCHLORIDE 10 MG/ML
4 INJECTION, SOLUTION INFILTRATION; PERINEURAL
Status: COMPLETED | OUTPATIENT
Start: 2024-06-19 | End: 2024-06-19

## 2024-06-19 RX ADMIN — BETAMETHASONE SODIUM PHOSPHATE AND BETAMETHASONE ACETATE 6 MG: 3; 3 INJECTION, SUSPENSION INTRA-ARTICULAR; INTRALESIONAL; INTRAMUSCULAR; SOFT TISSUE at 07:00

## 2024-06-19 RX ADMIN — LIDOCAINE HYDROCHLORIDE 4 ML: 10 INJECTION, SOLUTION INFILTRATION; PERINEURAL at 07:00
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
## 2024-07-22 ENCOUNTER — TELEPHONE (OUTPATIENT)
Dept: OBGYN CLINIC | Facility: HOSPITAL | Age: 54
End: 2024-07-22

## 2024-07-22 DIAGNOSIS — M23.91 INTERNAL DERANGEMENT OF RIGHT KNEE: Primary | ICD-10-CM

## 2024-07-22 NOTE — TELEPHONE ENCOUNTER
Caller: Patient    Doctor: Leslie    Reason for call: Patient was in for cortisone shot but did not really help. He would like to know if you could but in an MRI script for him for his rt knee so he can get that before his next appt. Thank you.    Call back#: [phone]

## 2024-07-22 NOTE — TELEPHONE ENCOUNTER
I placed an order for a right knee MRI. Please call the patient to make him aware. He may call central scheduling at [phone] to schedule.

## 2024-08-02 ENCOUNTER — HOSPITAL ENCOUNTER (OUTPATIENT)
Facility: MEDICAL CENTER | Age: 54
Discharge: HOME | End: 2024-08-02
Payer: COMMERCIAL

## 2024-08-02 DIAGNOSIS — M23.91 INTERNAL DERANGEMENT OF RIGHT KNEE: ICD-10-CM

## 2024-08-02 PROCEDURE — 73721 MRI JNT OF LWR EXTRE W/O DYE: CPT

## 2024-08-14 ENCOUNTER — OFFICE VISIT (OUTPATIENT)
Dept: OBGYN CLINIC | Facility: MEDICAL CENTER | Age: 54
End: 2024-08-14
Payer: COMMERCIAL

## 2024-08-14 VITALS
HEART RATE: 61 BPM | SYSTOLIC BLOOD PRESSURE: 123 MMHG | DIASTOLIC BLOOD PRESSURE: 82 MMHG | HEIGHT: 70 IN | BODY MASS INDEX: 28.63 KG/M2 | WEIGHT: 200 LBS

## 2024-08-14 DIAGNOSIS — M17.11 PRIMARY OSTEOARTHRITIS OF RIGHT KNEE: ICD-10-CM

## 2024-08-14 DIAGNOSIS — M23.203 DEGENERATIVE TEAR OF MEDIAL MENISCUS OF RIGHT KNEE: Primary | ICD-10-CM

## 2024-08-14 DIAGNOSIS — S83.242A OTHER TEAR OF MEDIAL MENISCUS, CURRENT INJURY, LEFT KNEE, INITIAL ENCOUNTER: ICD-10-CM

## 2024-08-14 PROCEDURE — 99214 OFFICE O/P EST MOD 30 MIN: CPT | Performed by: ORTHOPAEDIC SURGERY
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
## 2024-08-15 DIAGNOSIS — M17.11 PRIMARY OSTEOARTHRITIS OF RIGHT KNEE: Primary | ICD-10-CM

## 2024-08-15 RX ORDER — MELOXICAM 15 MG/1
15 TABLET ORAL DAILY
Qty: 30 TABLET | Refills: 0 | Status: SHIPPED | OUTPATIENT
Start: 2024-08-15 | End: 2024-09-14

## 2024-09-10 ENCOUNTER — OFFICE VISIT (OUTPATIENT)
Age: 54
End: 2024-09-10
Payer: COMMERCIAL

## 2024-09-10 VITALS
HEIGHT: 70 IN | WEIGHT: 198.8 LBS | SYSTOLIC BLOOD PRESSURE: 134 MMHG | BODY MASS INDEX: 28.46 KG/M2 | DIASTOLIC BLOOD PRESSURE: 84 MMHG

## 2024-09-10 DIAGNOSIS — M23.203 DEGENERATIVE TEAR OF MEDIAL MENISCUS OF RIGHT KNEE: ICD-10-CM

## 2024-09-10 DIAGNOSIS — M17.11 PRIMARY OSTEOARTHRITIS OF RIGHT KNEE: Primary | ICD-10-CM

## 2024-09-10 PROCEDURE — 99214 OFFICE O/P EST MOD 30 MIN: CPT | Performed by: STUDENT IN AN ORGANIZED HEALTH CARE EDUCATION/TRAINING PROGRAM

## 2024-09-10 RX ORDER — MELOXICAM 15 MG/1
15 TABLET ORAL DAILY
Qty: 30 TABLET | Refills: 1 | Status: SHIPPED | OUTPATIENT
Start: 2024-09-10 | End: 2024-11-09
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
## 2024-09-12 ENCOUNTER — TELEPHONE (OUTPATIENT)
Dept: OBGYN CLINIC | Facility: HOSPITAL | Age: 54
End: 2024-09-12

## 2024-09-12 NOTE — TELEPHONE ENCOUNTER
Caller: Patient    Doctor: ha    Reason for call: Patient was in to see you yesterday and has talked to his wife and has decided he wants to proceed with the rt knee surgery. Please call patient. Thank you.    Call back#: [phone]

## 2024-10-15 ENCOUNTER — PREP FOR PROCEDURE (OUTPATIENT)
Age: 54
End: 2024-10-15

## 2024-10-15 ENCOUNTER — OFFICE VISIT (OUTPATIENT)
Age: 54
End: 2024-10-15
Payer: COMMERCIAL

## 2024-10-15 VITALS — HEIGHT: 70 IN | WEIGHT: 196.8 LBS | BODY MASS INDEX: 28.18 KG/M2

## 2024-10-15 DIAGNOSIS — M17.11 PRIMARY OSTEOARTHRITIS OF RIGHT KNEE: Primary | ICD-10-CM

## 2024-10-15 PROCEDURE — 99215 OFFICE O/P EST HI 40 MIN: CPT | Performed by: STUDENT IN AN ORGANIZED HEALTH CARE EDUCATION/TRAINING PROGRAM

## 2024-10-15 RX ORDER — CHLORHEXIDINE GLUCONATE ORAL RINSE 1.2 MG/ML
15 SOLUTION DENTAL ONCE
OUTPATIENT
Start: 2024-10-15 | End: 2024-10-15

## 2024-10-15 RX ORDER — ASCORBIC ACID 500 MG
500 TABLET ORAL 2 TIMES DAILY
Qty: 60 TABLET | Refills: 1 | Status: SHIPPED | OUTPATIENT
Start: 2024-10-15

## 2024-10-15 RX ORDER — CHLORHEXIDINE GLUCONATE 40 MG/ML
SOLUTION TOPICAL DAILY PRN
OUTPATIENT
Start: 2024-10-15

## 2024-10-15 RX ORDER — SODIUM CHLORIDE, SODIUM LACTATE, POTASSIUM CHLORIDE, CALCIUM CHLORIDE 600; 310; 30; 20 MG/100ML; MG/100ML; MG/100ML; MG/100ML
125 INJECTION, SOLUTION INTRAVENOUS CONTINUOUS
OUTPATIENT
Start: 2024-10-15

## 2024-10-15 RX ORDER — ACETAMINOPHEN 325 MG/1
975 TABLET ORAL ONCE
OUTPATIENT
Start: 2024-10-15 | End: 2024-10-15

## 2024-10-15 RX ORDER — FOLIC ACID 1 MG/1
1 TABLET ORAL DAILY
Qty: 30 TABLET | Refills: 1 | Status: SHIPPED | OUTPATIENT
Start: 2024-10-15

## 2024-10-15 RX ORDER — MULTIVIT-MIN/IRON FUM/FOLIC AC 7.5 MG-4
1 TABLET ORAL DAILY
Qty: 30 TABLET | Refills: 1 | Status: SHIPPED | OUTPATIENT
Start: 2024-10-15
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
## 2024-10-17 DIAGNOSIS — R73.9 HYPERGLYCEMIA: Primary | ICD-10-CM

## 2024-10-21 ENCOUNTER — LAB (OUTPATIENT)
Dept: LAB | Facility: MEDICAL CENTER | Age: 54
End: 2024-10-21
Payer: COMMERCIAL

## 2024-10-21 ENCOUNTER — LAB REQUISITION (OUTPATIENT)
Dept: LAB | Facility: HOSPITAL | Age: 54
End: 2024-10-21
Payer: COMMERCIAL

## 2024-10-21 ENCOUNTER — APPOINTMENT (OUTPATIENT)
Dept: LAB | Facility: MEDICAL CENTER | Age: 54
End: 2024-10-21
Payer: COMMERCIAL

## 2024-10-21 DIAGNOSIS — R73.9 HYPERGLYCEMIA: ICD-10-CM

## 2024-10-21 DIAGNOSIS — E78.2 MIXED HYPERLIPIDEMIA: ICD-10-CM

## 2024-10-21 DIAGNOSIS — M17.11 PRIMARY OSTEOARTHRITIS OF RIGHT KNEE: ICD-10-CM

## 2024-10-21 DIAGNOSIS — M17.11 UNILATERAL PRIMARY OSTEOARTHRITIS, RIGHT KNEE: ICD-10-CM

## 2024-10-21 DIAGNOSIS — M10.9 ACUTE GOUT INVOLVING TOE, UNSPECIFIED CAUSE, UNSPECIFIED LATERALITY: ICD-10-CM

## 2024-10-21 LAB
ABO GROUP BLD: NORMAL
ALBUMIN SERPL BCG-MCNC: 4.4 G/DL (ref 3.5–5)
ALP SERPL-CCNC: 36 U/L (ref 34–104)
ALT SERPL W P-5'-P-CCNC: 25 U/L (ref 7–52)
ANION GAP SERPL CALCULATED.3IONS-SCNC: 13 MMOL/L (ref 4–13)
APTT PPP: 24 SECONDS (ref 23–34)
AST SERPL W P-5'-P-CCNC: 35 U/L (ref 13–39)
ATRIAL RATE: 59 BPM
BASOPHILS # BLD AUTO: 0.05 THOUSANDS/ΜL (ref 0–0.1)
BASOPHILS NFR BLD AUTO: 1 % (ref 0–1)
BILIRUB SERPL-MCNC: 1.09 MG/DL (ref 0.2–1)
BLD GP AB SCN SERPL QL: NEGATIVE
BUN SERPL-MCNC: 17 MG/DL (ref 5–25)
CALCIUM SERPL-MCNC: 9.1 MG/DL (ref 8.4–10.2)
CHLORIDE SERPL-SCNC: 102 MMOL/L (ref 96–108)
CHOLEST SERPL-MCNC: 173 MG/DL
CO2 SERPL-SCNC: 26 MMOL/L (ref 21–32)
CREAT SERPL-MCNC: 0.72 MG/DL (ref 0.6–1.3)
EOSINOPHIL # BLD AUTO: 0.15 THOUSAND/ΜL (ref 0–0.61)
EOSINOPHIL NFR BLD AUTO: 3 % (ref 0–6)
ERYTHROCYTE [DISTWIDTH] IN BLOOD BY AUTOMATED COUNT: 13.1 % (ref 11.6–15.1)
EST. AVERAGE GLUCOSE BLD GHB EST-MCNC: 111 MG/DL
GFR SERPL CREATININE-BSD FRML MDRD: 105 ML/MIN/1.73SQ M
GLUCOSE P FAST SERPL-MCNC: 116 MG/DL (ref 65–99)
HBA1C MFR BLD: 5.5 %
HCT VFR BLD AUTO: 42.8 % (ref 36.5–49.3)
HDLC SERPL-MCNC: 85 MG/DL
HGB BLD-MCNC: 13.8 G/DL (ref 12–17)
IMM GRANULOCYTES # BLD AUTO: 0.02 THOUSAND/UL (ref 0–0.2)
IMM GRANULOCYTES NFR BLD AUTO: 0 % (ref 0–2)
INR PPP: 1.04 (ref 0.85–1.19)
LDLC SERPL CALC-MCNC: 72 MG/DL (ref 0–100)
LYMPHOCYTES # BLD AUTO: 1.21 THOUSANDS/ΜL (ref 0.6–4.47)
LYMPHOCYTES NFR BLD AUTO: 21 % (ref 14–44)
MCH RBC QN AUTO: 31.2 PG (ref 26.8–34.3)
MCHC RBC AUTO-ENTMCNC: 32.2 G/DL (ref 31.4–37.4)
MCV RBC AUTO: 97 FL (ref 82–98)
MONOCYTES # BLD AUTO: 0.56 THOUSAND/ΜL (ref 0.17–1.22)
MONOCYTES NFR BLD AUTO: 10 % (ref 4–12)
NEUTROPHILS # BLD AUTO: 3.83 THOUSANDS/ΜL (ref 1.85–7.62)
NEUTS SEG NFR BLD AUTO: 65 % (ref 43–75)
NONHDLC SERPL-MCNC: 88 MG/DL
NRBC BLD AUTO-RTO: 0 /100 WBCS
P AXIS: 34 DEGREES
PLATELET # BLD AUTO: 229 THOUSANDS/UL (ref 149–390)
PMV BLD AUTO: 10.8 FL (ref 8.9–12.7)
POTASSIUM SERPL-SCNC: 4.5 MMOL/L (ref 3.5–5.3)
PR INTERVAL: 166 MS
PROT SERPL-MCNC: 7 G/DL (ref 6.4–8.4)
PROTHROMBIN TIME: 13.9 SECONDS (ref 12.3–15)
PSA SERPL-MCNC: 1.22 NG/ML (ref 0–4)
QRS AXIS: 27 DEGREES
QRSD INTERVAL: 84 MS
QT INTERVAL: 406 MS
QTC INTERVAL: 401 MS
RBC # BLD AUTO: 4.43 MILLION/UL (ref 3.88–5.62)
RH BLD: NEGATIVE
SODIUM SERPL-SCNC: 141 MMOL/L (ref 135–147)
SPECIMEN EXPIRATION DATE: NORMAL
T WAVE AXIS: 39 DEGREES
TRIGL SERPL-MCNC: 78 MG/DL
URATE SERPL-MCNC: 5.9 MG/DL (ref 3.5–8.5)
VENTRICULAR RATE: 59 BPM
WBC # BLD AUTO: 5.82 THOUSAND/UL (ref 4.31–10.16)

## 2024-10-21 PROCEDURE — 80053 COMPREHEN METABOLIC PANEL: CPT

## 2024-10-21 PROCEDURE — 86900 BLOOD TYPING SEROLOGIC ABO: CPT | Performed by: PHYSICIAN ASSISTANT

## 2024-10-21 PROCEDURE — 83036 HEMOGLOBIN GLYCOSYLATED A1C: CPT

## 2024-10-21 PROCEDURE — 84550 ASSAY OF BLOOD/URIC ACID: CPT

## 2024-10-21 PROCEDURE — 86850 RBC ANTIBODY SCREEN: CPT | Performed by: PHYSICIAN ASSISTANT

## 2024-10-21 PROCEDURE — 93010 ELECTROCARDIOGRAM REPORT: CPT | Performed by: INTERNAL MEDICINE

## 2024-10-21 PROCEDURE — 85730 THROMBOPLASTIN TIME PARTIAL: CPT

## 2024-10-21 PROCEDURE — 85610 PROTHROMBIN TIME: CPT

## 2024-10-21 PROCEDURE — 80061 LIPID PANEL: CPT

## 2024-10-21 PROCEDURE — 36415 COLL VENOUS BLD VENIPUNCTURE: CPT

## 2024-10-21 PROCEDURE — 85025 COMPLETE CBC W/AUTO DIFF WBC: CPT

## 2024-10-21 PROCEDURE — 86901 BLOOD TYPING SEROLOGIC RH(D): CPT | Performed by: PHYSICIAN ASSISTANT

## 2024-10-21 NOTE — RESULT ENCOUNTER NOTE
Tests were not normal, and should follow at  your scheduled Office visit. Please call about this, if [employer] message is not available or not read by patient.

## 2024-10-22 ENCOUNTER — TELEPHONE (OUTPATIENT)
Dept: OBGYN CLINIC | Facility: HOSPITAL | Age: 54
End: 2024-10-22

## 2024-10-23 ENCOUNTER — RA CDI HCC (OUTPATIENT)
Dept: OTHER | Facility: HOSPITAL | Age: 54
End: 2024-10-23

## 2024-10-23 NOTE — TELEPHONE ENCOUNTER
Preoperative Elective Admission Assessment- spoke to patient.     Living Situation:    Who does pt live with: spouse  What kind of home: multi-level  How do they enter the home: front  How many levels in home: 2 level   # of steps to enter home: 1 to enter   # of steps to second floor: 10-12 to 2nd floor.   Are there handrails: Yes  Are there landings: No  Sleeping arrangement: first/entry floor  Where is Bathroom: First floor bath, full bath (walk in shower- with seat and bars).      First Floor Setup:   Is there a bathroom: Yes  Where would pt sleep: bed     DME: grab bars and rolling walker (borrowing from mother, instructed to bring DOS).     We discussed clearing pathways in the home and making sure there is accessibly to use the walker, for example, removing throw rugs.      Patient's Current Level of Function: Ambulates: Independently and ADLs: Independent    Post-op Caregiver: spouse   Currently receive any HHC/aides/community supports: No     Post-op Transport: spouse  To/from hospital: spouse  To/from PT 2-3x/week: spouse or mother   Uses community transport now: No     Outpatient Physical Therapy Site:  Site: [place]   pre and post-op appts scheduled? Yes     Medication Management: self  Preferred Pharmacy for Post-op Medications: [id number] - [place] - [address] [[id number]]   Blood Management Vitamin Regimen: Pt confirms taking as prescribed  Post-op anticoagulant: to be determined by surgical team postoperatively     DC Plan: Pt plans to be discharged home    Barriers to DC identified preoperatively: none identified    BMI: 28.24    Patient Education:  Pt educated on post-op pain, early mobilization (POD0), LOS goals, OP PT goals, and preoperative bathing. Patient educated that our goal is to appropriately discharge patient based off their post-op function while striving to maintain maximal independence. The goal is to discharge patient to home and for them to attend outpatient physical  therapy.    Assigned to care team? Yes

## 2024-10-24 ENCOUNTER — OFFICE VISIT (OUTPATIENT)
Dept: FAMILY MEDICINE CLINIC | Facility: CLINIC | Age: 54
End: 2024-10-24
Payer: COMMERCIAL

## 2024-10-24 VITALS
OXYGEN SATURATION: 100 % | HEIGHT: 70 IN | TEMPERATURE: 97.3 F | SYSTOLIC BLOOD PRESSURE: 94 MMHG | HEART RATE: 69 BPM | WEIGHT: 193 LBS | BODY MASS INDEX: 27.63 KG/M2 | DIASTOLIC BLOOD PRESSURE: 68 MMHG

## 2024-10-24 DIAGNOSIS — R73.9 HYPERGLYCEMIA: ICD-10-CM

## 2024-10-24 DIAGNOSIS — E78.2 MIXED HYPERLIPIDEMIA: ICD-10-CM

## 2024-10-24 DIAGNOSIS — I49.1 PAC (PREMATURE ATRIAL CONTRACTION): ICD-10-CM

## 2024-10-24 DIAGNOSIS — M17.11 PRIMARY OSTEOARTHRITIS OF RIGHT KNEE: ICD-10-CM

## 2024-10-24 DIAGNOSIS — Z12.5 PROSTATE CANCER SCREENING: ICD-10-CM

## 2024-10-24 DIAGNOSIS — M1A.9XX0 CHRONIC GOUT INVOLVING TOE WITHOUT TOPHUS, UNSPECIFIED CAUSE, UNSPECIFIED LATERALITY: ICD-10-CM

## 2024-10-24 DIAGNOSIS — Z01.818 PRE-OP EXAMINATION: Primary | ICD-10-CM

## 2024-10-24 PROCEDURE — 99214 OFFICE O/P EST MOD 30 MIN: CPT | Performed by: FAMILY MEDICINE

## 2024-10-24 NOTE — LETTER
2024     Gil Flores, DO  [facility]  [address] [place] [id number]    Patient: Gil Mcdonald   YOB: 1970   Date of Visit: 10/24/2024       Dear Dr. Flores:    Thank you for referring Gil Mcdonald to me for evaluation. Below are my notes for this consultation.    I reviewed the preop labs, as well as the EKG.  Patient appears at low risk for the planned surgery.  Please see my documentation from today.    If you have questions, please do not hesitate to call me. I look forward to following your patient along with you.         Sincerely,        Rocky Price MD        CC: JAMES Peck CRNP Todd Reichert Holbrook, MD  10/24/2024  9:38 AM  Sign when Signing Visit  Ambulatory Visit  Name: Gil Mcdonald      : 1970      MRN: [id number]  Encounter Provider: Rocky Price MD  Encounter Date: 10/24/2024   [facility]    Assessment & Plan  Pre-op examination  Patient appears to be at reasonable risk for the upcoming surgery.  Reviewed RCRI, as well as his METs.  Based on that, I feel that he would be reasonable risk for the planned surgery.       Mixed hyperlipidemia  Cholesterol is doing fantastic at this point.  Continue Crestor.  Check in 6 months.    Orders:  •  Comprehensive metabolic panel; Future  •  Lipid panel; Future    Hyperglycemia  Blood sugar was minimal elevation.  A1c was fantastic.  No changes.    Orders:  •  Comprehensive metabolic panel; Future    Chronic gout involving toe without tophus, unspecified cause, unspecified laterality  History of gout.  Last uric acid was excellent.  Uses colchicine as needed.  No other medications.  Does understand dietary triggers.         Primary osteoarthritis of right knee  Patient is planning on having right knee replacement in the near future, plan currently is for partial.  May need to secondarily change to total knee  replacement.  That decision will be made by the orthopedist at the time.  Patient appears to be reasonable risk for either procedure.    May proceed at this point.         PAC (premature atrial contraction)  EKG performed preop, reviewed result.  No abnormalities.  No concerns.         Prostate cancer screening    Orders:  •  PSA, Total Screen; Future         1. Pre-op examination  2. Mixed hyperlipidemia  Assessment & Plan:  Cholesterol is doing fantastic at this point.  Continue Crestor.  Check in 6 months.         Orders:  -     Comprehensive metabolic panel; Future; Expected date: 04/24/2025  -     Lipid panel; Future; Expected date: 04/24/2025  3. Hyperglycemia  Assessment & Plan:  Blood sugar was minimal elevation.  A1c was fantastic.  No changes.         Orders:  -     Comprehensive metabolic panel; Future; Expected date: 04/24/2025  4. Chronic gout involving toe without tophus, unspecified cause, unspecified laterality  Assessment & Plan:  History of gout.  Last uric acid was excellent.  Uses colchicine as needed.  No other medications.  Does understand dietary triggers.         5. Primary osteoarthritis of right knee  Assessment & Plan:  Patient is planning on having right knee replacement in the near future, plan currently is for partial.  May need to secondarily change to total knee replacement.  That decision will be made by the orthopedist at the time.  Patient appears to be reasonable risk for either procedure.    May proceed at this point.         6. PAC (premature atrial contraction)  Assessment & Plan:  EKG performed preop, reviewed result.  No abnormalities.  No concerns.         7. Prostate cancer screening  -     PSA, Total Screen; Future; Expected date: 10/24/2025      Chief Complaint   Patient presents with   • Follow-up     Follow up to chronic conditions and review lab results.          History of Present Illness    Patient is here today for follow-up exam.  Also, has had some issues with  regard to his right knee.  Went to orthopedics.  They recommended partial knee replacement.  That will be happening in the near future.  Dr. Flores with [facility].    Patient did have preop blood work done.  Reviewed.  Also reviewed his preop EKG today.    Hyperlipidemia: Reviewed labs.  Currently on Crestor.    Hyperglycemia: Has had elevated sugars previously.  The A1c has never been elevated, however.  Reviewed that currently.    Gout: History of gout.  Not on long-term treatment.  Has had elevated uric acid previously, but understands dietary risks.  That seems to be the trigger for him.  If he pays attention to diet, he has much less problems.  He does have colchicine, but has not needed to use that in a bit.    PACs: Noted previously.  Again, prior EKGs did not show significant abnormalities.  Reviewed recent preop EKG that was done.    Prostate cancer screening: Reviewed PSA.  Reviewed digital rectal exam is not currently recommended by urology for routine.              Review of Systems   Constitutional: Negative.    HENT: Negative.     Eyes: Negative.    Respiratory: Negative.     Cardiovascular: Negative.    Gastrointestinal: Negative.    Endocrine: Negative.    Genitourinary: Negative.    Musculoskeletal: Negative.    Skin: Negative.    Allergic/Immunologic: Negative.    Neurological: Negative.    Hematological: Negative.    Psychiatric/Behavioral: Negative.           Functional capacity: Shovel snow                          6 Mets   Pick the highest level patient can comfortably perform   4 mets or greater for surgery    RCRI  High Risk surgery?         1 Point  CAD History:         1 Point   MI; Positive Stress Test; CP due to Mi;  Nitrate Usage to control Angina; Pathologic Q wave on EKG  CHF Active:         1 Point   Pulm Edema; Paroxysmal Nocturnal Dyspnea;  Bibasilar Rales (crackles);S3; CHF on CXR  Cerebrovascular Disease (TIA or CVA):     1 Point  DM on Insulin:        1 Point  Serum Creat >2.0  "mg/dl:       1 Point          Total Points: 1     Scorin: Class I, Very Low Risk (0.4%)     1: Class II, Low risk (0.9%)     2: Class III Moderate (6.6%)     3: Class IV High (>11%)    Objective    BP 94/68   Pulse 69   Temp (!) 97.3 °F (36.3 °C)   Ht 5' 10\" (1.778 m)   Wt 87.5 kg (193 lb)   SpO2 100%   BMI 27.69 kg/m²     A1c 5.5.  PTT 24, INR 1.04.  Type and screen a negative  White count 5.82, hemoglobin 13.8, hematocrit 42.8, platelets 229.  Uric acid 5.9.  Total cholesterol 173, LDL 72, HDL 85, triglycerides 78.  Sodium 141, potassium 4.5, calcium 9.1.  Blood sugar 116.  Creatinine 0.72, .  AST 35, ALT 25.  PSA 1.219.    Physical Exam  Vitals and nursing note reviewed.   Constitutional:       General: He is not in acute distress.     Appearance: He is well-developed. He is not diaphoretic.   HENT:      Head: Normocephalic and atraumatic.      Right Ear: Hearing, tympanic membrane, ear canal and external ear normal.      Left Ear: Hearing, tympanic membrane, ear canal and external ear normal.      Nose: Nose normal.      Right Sinus: No maxillary sinus tenderness or frontal sinus tenderness.      Left Sinus: No maxillary sinus tenderness or frontal sinus tenderness.      Mouth/Throat:      Pharynx: Uvula midline. No oropharyngeal exudate.   Eyes:      General: Lids are everted, no foreign bodies appreciated.      Conjunctiva/sclera: Conjunctivae normal.      Pupils: Pupils are equal, round, and reactive to light.   Neck:      Thyroid: No thyromegaly.      Vascular: No carotid bruit.      Trachea: No tracheal deviation.   Cardiovascular:      Rate and Rhythm: Normal rate and regular rhythm.      Pulses:           Carotid pulses are 2+ on the right side and 2+ on the left side.     Heart sounds: Normal heart sounds. No murmur heard.     No friction rub. No gallop.   Pulmonary:      Effort: Pulmonary effort is normal. No respiratory distress.      Breath sounds: Normal breath sounds. No wheezing or " rales.   Abdominal:      General: Bowel sounds are normal. There is no distension.      Palpations: Abdomen is soft.      Tenderness: There is no abdominal tenderness.   Musculoskeletal:      Cervical back: Normal range of motion and neck supple.   Lymphadenopathy:      Cervical: No cervical adenopathy.   Skin:     General: Skin is warm and dry.   Neurological:      Mental Status: He is alert and oriented to person, place, and time.      Coordination: Coordination normal.   Psychiatric:         Behavior: Behavior normal.         Thought Content: Thought content normal.         Judgment: Judgment normal.         Pre-Surgery Instructions:   Medication Instructions   • ascorbic acid (VITAMIN C) 500 MG tablet Stop taking 3 days prior to surgery   • Cholecalciferol (VITAMIN D3) 1,000 units tablet per anesthesia guidelines    • colchicine (COLCRYS) 0.6 mg tablet per anesthesia guidelines    • folic acid (FOLVITE) 1 mg tablet per anesthesia guidelines    • meloxicam (Mobic) 15 mg tablet Stop taking 1 week prior to surgery   • Multiple Vitamin (multivitamin) tablet per anesthesia guidelines    • Multiple Vitamins-Minerals (multivitamin with minerals) tablet per anesthesia guidelines    • rosuvastatin (CRESTOR) 10 MG tablet per anesthesia guidelines

## 2024-10-24 NOTE — ASSESSMENT & PLAN NOTE
Patient is planning on having right knee replacement in the near future, plan currently is for partial.  May need to secondarily change to total knee replacement.  That decision will be made by the orthopedist at the time.  Patient appears to be reasonable risk for either procedure.    May proceed at this point.

## 2024-10-24 NOTE — PATIENT INSTRUCTIONS
Pre-Surgery Instructions:   Medication Instructions    ascorbic acid (VITAMIN C) 500 MG tablet Stop taking 3 days prior to surgery    Cholecalciferol (VITAMIN D3) 1,000 units tablet per anesthesia guidelines     colchicine (COLCRYS) 0.6 mg tablet per anesthesia guidelines     folic acid (FOLVITE) 1 mg tablet per anesthesia guidelines     meloxicam (Mobic) 15 mg tablet Stop taking 1 week prior to surgery    Multiple Vitamin (multivitamin) tablet per anesthesia guidelines     Multiple Vitamins-Minerals (multivitamin with minerals) tablet per anesthesia guidelines     rosuvastatin (CRESTOR) 10 MG tablet per anesthesia guidelines

## 2024-10-24 NOTE — ASSESSMENT & PLAN NOTE
History of gout.  Last uric acid was excellent.  Uses colchicine as needed.  No other medications.  Does understand dietary triggers.

## 2024-10-24 NOTE — ASSESSMENT & PLAN NOTE
Cholesterol is doing fantastic at this point.  Continue Crestor.  Check in 6 months.    Orders:    Comprehensive metabolic panel; Future    Lipid panel; Future

## 2024-10-24 NOTE — PROGRESS NOTES
Ambulatory Visit  Name: Gil Mcdonald      : 1970      MRN: [id number]  Encounter Provider: Rocky Price MD  Encounter Date: 10/24/2024   Encounter department: [facility] PRIMARY CARE    Assessment & Plan  Pre-op examination  Patient appears to be at reasonable risk for the upcoming surgery.  Reviewed RCRI, as well as his METs.  Based on that, I feel that he would be reasonable risk for the planned surgery.       Mixed hyperlipidemia  Cholesterol is doing fantastic at this point.  Continue Crestor.  Check in 6 months.    Orders:    Comprehensive metabolic panel; Future    Lipid panel; Future    Hyperglycemia  Blood sugar was minimal elevation.  A1c was fantastic.  No changes.    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  Last uric acid was excellent.  Uses colchicine as needed.  No other medications.  Does understand dietary triggers.         Primary osteoarthritis of right knee  Patient is planning on having right knee replacement in the near future, plan currently is for partial.  May need to secondarily change to total knee replacement.  That decision will be made by the orthopedist at the time.  Patient appears to be reasonable risk for either procedure.    May proceed at this point.         PAC (premature atrial contraction)  EKG performed preop, reviewed result.  No abnormalities.  No concerns.         Prostate cancer screening    Orders:    PSA, Total Screen; Future         1. Pre-op examination  2. Mixed hyperlipidemia  Assessment & Plan:  Cholesterol is doing fantastic at this point.  Continue Crestor.  Check in 6 months.         Orders:  -     Comprehensive metabolic panel; Future; Expected date: 2025  -     Lipid panel; Future; Expected date: 2025  3. Hyperglycemia  Assessment & Plan:  Blood sugar was minimal elevation.  A1c was fantastic.  No  changes.         Orders:  -     Comprehensive metabolic panel; Future; Expected date: 04/24/2025  4. Chronic gout involving toe without tophus, unspecified cause, unspecified laterality  Assessment & Plan:  History of gout.  Last uric acid was excellent.  Uses colchicine as needed.  No other medications.  Does understand dietary triggers.         5. Primary osteoarthritis of right knee  Assessment & Plan:  Patient is planning on having right knee replacement in the near future, plan currently is for partial.  May need to secondarily change to total knee replacement.  That decision will be made by the orthopedist at the time.  Patient appears to be reasonable risk for either procedure.    May proceed at this point.         6. PAC (premature atrial contraction)  Assessment & Plan:  EKG performed preop, reviewed result.  No abnormalities.  No concerns.         7. Prostate cancer screening  -     PSA, Total Screen; Future; Expected date: 10/24/2025      Chief Complaint   Patient presents with    Follow-up     Follow up to chronic conditions and review lab results.          History of Present Illness     Patient is here today for follow-up exam.  Also, has had some issues with regard to his right knee.  Went to orthopedics.  They recommended partial knee replacement.  That will be happening in the near future.  Dr. Flores with [facility].    Patient did have preop blood work done.  Reviewed.  Also reviewed his preop EKG today.    Hyperlipidemia: Reviewed labs.  Currently on Crestor.    Hyperglycemia: Has had elevated sugars previously.  The A1c has never been elevated, however.  Reviewed that currently.    Gout: History of gout.  Not on long-term treatment.  Has had elevated uric acid previously, but understands dietary risks.  That seems to be the trigger for him.  If he pays attention to diet, he has much less problems.  He does have colchicine, but has not needed to use that in a bit.    PACs: Noted previously.   "Again, prior EKGs did not show significant abnormalities.  Reviewed recent preop EKG that was done.    Prostate cancer screening: Reviewed PSA.  Reviewed digital rectal exam is not currently recommended by urology for routine.              Review of Systems   Constitutional: Negative.    HENT: Negative.     Eyes: Negative.    Respiratory: Negative.     Cardiovascular: Negative.    Gastrointestinal: Negative.    Endocrine: Negative.    Genitourinary: Negative.    Musculoskeletal: Negative.    Skin: Negative.    Allergic/Immunologic: Negative.    Neurological: Negative.    Hematological: Negative.    Psychiatric/Behavioral: Negative.           Functional capacity: Shovel snow                          6 Mets   Pick the highest level patient can comfortably perform   4 mets or greater for surgery    RCRI  High Risk surgery?         1 Point  CAD History:         1 Point   MI; Positive Stress Test; CP due to Mi;  Nitrate Usage to control Angina; Pathologic Q wave on EKG  CHF Active:         1 Point   Pulm Edema; Paroxysmal Nocturnal Dyspnea;  Bibasilar Rales (crackles);S3; CHF on CXR  Cerebrovascular Disease (TIA or CVA):     1 Point  DM on Insulin:        1 Point  Serum Creat >2.0 mg/dl:       1 Point          Total Points: 1     Scorin: Class I, Very Low Risk (0.4%)     1: Class II, Low risk (0.9%)     2: Class III Moderate (6.6%)     3: Class IV High (>11%)    Objective     BP 94/68   Pulse 69   Temp (!) 97.3 °F (36.3 °C)   Ht 5' 10\" (1.778 m)   Wt 87.5 kg (193 lb)   SpO2 100%   BMI 27.69 kg/m²     A1c 5.5.  PTT 24, INR 1.04.  Type and screen a negative  White count 5.82, hemoglobin 13.8, hematocrit 42.8, platelets 229.  Uric acid 5.9.  Total cholesterol 173, LDL 72, HDL 85, triglycerides 78.  Sodium 141, potassium 4.5, calcium 9.1.  Blood sugar 116.  Creatinine 0.72, .  AST 35, ALT 25.  PSA 1.219.    Physical Exam  Vitals and nursing note reviewed.   Constitutional:       General: He is not in acute " distress.     Appearance: He is well-developed. He is not diaphoretic.   HENT:      Head: Normocephalic and atraumatic.      Right Ear: Hearing, tympanic membrane, ear canal and external ear normal.      Left Ear: Hearing, tympanic membrane, ear canal and external ear normal.      Nose: Nose normal.      Right Sinus: No maxillary sinus tenderness or frontal sinus tenderness.      Left Sinus: No maxillary sinus tenderness or frontal sinus tenderness.      Mouth/Throat:      Pharynx: Uvula midline. No oropharyngeal exudate.   Eyes:      General: Lids are everted, no foreign bodies appreciated.      Conjunctiva/sclera: Conjunctivae normal.      Pupils: Pupils are equal, round, and reactive to light.   Neck:      Thyroid: No thyromegaly.      Vascular: No carotid bruit.      Trachea: No tracheal deviation.   Cardiovascular:      Rate and Rhythm: Normal rate and regular rhythm.      Pulses:           Carotid pulses are 2+ on the right side and 2+ on the left side.     Heart sounds: Normal heart sounds. No murmur heard.     No friction rub. No gallop.   Pulmonary:      Effort: Pulmonary effort is normal. No respiratory distress.      Breath sounds: Normal breath sounds. No wheezing or rales.   Abdominal:      General: Bowel sounds are normal. There is no distension.      Palpations: Abdomen is soft.      Tenderness: There is no abdominal tenderness.   Musculoskeletal:      Cervical back: Normal range of motion and neck supple.   Lymphadenopathy:      Cervical: No cervical adenopathy.   Skin:     General: Skin is warm and dry.   Neurological:      Mental Status: He is alert and oriented to person, place, and time.      Coordination: Coordination normal.   Psychiatric:         Behavior: Behavior normal.         Thought Content: Thought content normal.         Judgment: Judgment normal.         Pre-Surgery Instructions:   Medication Instructions    ascorbic acid (VITAMIN C) 500 MG tablet Stop taking 3 days prior to surgery     Cholecalciferol (VITAMIN D3) 1,000 units tablet per anesthesia guidelines     colchicine (COLCRYS) 0.6 mg tablet per anesthesia guidelines     folic acid (FOLVITE) 1 mg tablet per anesthesia guidelines     meloxicam (Mobic) 15 mg tablet Stop taking 1 week prior to surgery    Multiple Vitamin (multivitamin) tablet per anesthesia guidelines     Multiple Vitamins-Minerals (multivitamin with minerals) tablet per anesthesia guidelines     rosuvastatin (CRESTOR) 10 MG tablet per anesthesia guidelines

## 2024-10-24 NOTE — H&P (VIEW-ONLY)
Ambulatory Visit  Name: Gil Mcdonald      : 1970      MRN: [id number]  Encounter Provider: Rocky Price MD  Encounter Date: 10/24/2024   Encounter department: [facility] PRIMARY CARE    Assessment & Plan  Pre-op examination  Patient appears to be at reasonable risk for the upcoming surgery.  Reviewed RCRI, as well as his METs.  Based on that, I feel that he would be reasonable risk for the planned surgery.       Mixed hyperlipidemia  Cholesterol is doing fantastic at this point.  Continue Crestor.  Check in 6 months.    Orders:    Comprehensive metabolic panel; Future    Lipid panel; Future    Hyperglycemia  Blood sugar was minimal elevation.  A1c was fantastic.  No changes.    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  Last uric acid was excellent.  Uses colchicine as needed.  No other medications.  Does understand dietary triggers.         Primary osteoarthritis of right knee  Patient is planning on having right knee replacement in the near future, plan currently is for partial.  May need to secondarily change to total knee replacement.  That decision will be made by the orthopedist at the time.  Patient appears to be reasonable risk for either procedure.    May proceed at this point.         PAC (premature atrial contraction)  EKG performed preop, reviewed result.  No abnormalities.  No concerns.         Prostate cancer screening    Orders:    PSA, Total Screen; Future         1. Pre-op examination  2. Mixed hyperlipidemia  Assessment & Plan:  Cholesterol is doing fantastic at this point.  Continue Crestor.  Check in 6 months.         Orders:  -     Comprehensive metabolic panel; Future; Expected date: 2025  -     Lipid panel; Future; Expected date: 2025  3. Hyperglycemia  Assessment & Plan:  Blood sugar was minimal elevation.  A1c was fantastic.  No  changes.         Orders:  -     Comprehensive metabolic panel; Future; Expected date: 04/24/2025  4. Chronic gout involving toe without tophus, unspecified cause, unspecified laterality  Assessment & Plan:  History of gout.  Last uric acid was excellent.  Uses colchicine as needed.  No other medications.  Does understand dietary triggers.         5. Primary osteoarthritis of right knee  Assessment & Plan:  Patient is planning on having right knee replacement in the near future, plan currently is for partial.  May need to secondarily change to total knee replacement.  That decision will be made by the orthopedist at the time.  Patient appears to be reasonable risk for either procedure.    May proceed at this point.         6. PAC (premature atrial contraction)  Assessment & Plan:  EKG performed preop, reviewed result.  No abnormalities.  No concerns.         7. Prostate cancer screening  -     PSA, Total Screen; Future; Expected date: 10/24/2025      Chief Complaint   Patient presents with    Follow-up     Follow up to chronic conditions and review lab results.          History of Present Illness     Patient is here today for follow-up exam.  Also, has had some issues with regard to his right knee.  Went to orthopedics.  They recommended partial knee replacement.  That will be happening in the near future.  Dr. Flores with [facility].    Patient did have preop blood work done.  Reviewed.  Also reviewed his preop EKG today.    Hyperlipidemia: Reviewed labs.  Currently on Crestor.    Hyperglycemia: Has had elevated sugars previously.  The A1c has never been elevated, however.  Reviewed that currently.    Gout: History of gout.  Not on long-term treatment.  Has had elevated uric acid previously, but understands dietary risks.  That seems to be the trigger for him.  If he pays attention to diet, he has much less problems.  He does have colchicine, but has not needed to use that in a bit.    PACs: Noted previously.   "Again, prior EKGs did not show significant abnormalities.  Reviewed recent preop EKG that was done.    Prostate cancer screening: Reviewed PSA.  Reviewed digital rectal exam is not currently recommended by urology for routine.              Review of Systems   Constitutional: Negative.    HENT: Negative.     Eyes: Negative.    Respiratory: Negative.     Cardiovascular: Negative.    Gastrointestinal: Negative.    Endocrine: Negative.    Genitourinary: Negative.    Musculoskeletal: Negative.    Skin: Negative.    Allergic/Immunologic: Negative.    Neurological: Negative.    Hematological: Negative.    Psychiatric/Behavioral: Negative.           Functional capacity: Shovel snow                          6 Mets   Pick the highest level patient can comfortably perform   4 mets or greater for surgery    RCRI  High Risk surgery?         1 Point  CAD History:         1 Point   MI; Positive Stress Test; CP due to Mi;  Nitrate Usage to control Angina; Pathologic Q wave on EKG  CHF Active:         1 Point   Pulm Edema; Paroxysmal Nocturnal Dyspnea;  Bibasilar Rales (crackles);S3; CHF on CXR  Cerebrovascular Disease (TIA or CVA):     1 Point  DM on Insulin:        1 Point  Serum Creat >2.0 mg/dl:       1 Point          Total Points: 1     Scorin: Class I, Very Low Risk (0.4%)     1: Class II, Low risk (0.9%)     2: Class III Moderate (6.6%)     3: Class IV High (>11%)    Objective     BP 94/68   Pulse 69   Temp (!) 97.3 °F (36.3 °C)   Ht 5' 10\" (1.778 m)   Wt 87.5 kg (193 lb)   SpO2 100%   BMI 27.69 kg/m²     A1c 5.5.  PTT 24, INR 1.04.  Type and screen a negative  White count 5.82, hemoglobin 13.8, hematocrit 42.8, platelets 229.  Uric acid 5.9.  Total cholesterol 173, LDL 72, HDL 85, triglycerides 78.  Sodium 141, potassium 4.5, calcium 9.1.  Blood sugar 116.  Creatinine 0.72, .  AST 35, ALT 25.  PSA 1.219.    Physical Exam  Vitals and nursing note reviewed.   Constitutional:       General: He is not in acute " distress.     Appearance: He is well-developed. He is not diaphoretic.   HENT:      Head: Normocephalic and atraumatic.      Right Ear: Hearing, tympanic membrane, ear canal and external ear normal.      Left Ear: Hearing, tympanic membrane, ear canal and external ear normal.      Nose: Nose normal.      Right Sinus: No maxillary sinus tenderness or frontal sinus tenderness.      Left Sinus: No maxillary sinus tenderness or frontal sinus tenderness.      Mouth/Throat:      Pharynx: Uvula midline. No oropharyngeal exudate.   Eyes:      General: Lids are everted, no foreign bodies appreciated.      Conjunctiva/sclera: Conjunctivae normal.      Pupils: Pupils are equal, round, and reactive to light.   Neck:      Thyroid: No thyromegaly.      Vascular: No carotid bruit.      Trachea: No tracheal deviation.   Cardiovascular:      Rate and Rhythm: Normal rate and regular rhythm.      Pulses:           Carotid pulses are 2+ on the right side and 2+ on the left side.     Heart sounds: Normal heart sounds. No murmur heard.     No friction rub. No gallop.   Pulmonary:      Effort: Pulmonary effort is normal. No respiratory distress.      Breath sounds: Normal breath sounds. No wheezing or rales.   Abdominal:      General: Bowel sounds are normal. There is no distension.      Palpations: Abdomen is soft.      Tenderness: There is no abdominal tenderness.   Musculoskeletal:      Cervical back: Normal range of motion and neck supple.   Lymphadenopathy:      Cervical: No cervical adenopathy.   Skin:     General: Skin is warm and dry.   Neurological:      Mental Status: He is alert and oriented to person, place, and time.      Coordination: Coordination normal.   Psychiatric:         Behavior: Behavior normal.         Thought Content: Thought content normal.         Judgment: Judgment normal.         Pre-Surgery Instructions:   Medication Instructions    ascorbic acid (VITAMIN C) 500 MG tablet Stop taking 3 days prior to surgery     Cholecalciferol (VITAMIN D3) 1,000 units tablet per anesthesia guidelines     colchicine (COLCRYS) 0.6 mg tablet per anesthesia guidelines     folic acid (FOLVITE) 1 mg tablet per anesthesia guidelines     meloxicam (Mobic) 15 mg tablet Stop taking 1 week prior to surgery    Multiple Vitamin (multivitamin) tablet per anesthesia guidelines     Multiple Vitamins-Minerals (multivitamin with minerals) tablet per anesthesia guidelines     rosuvastatin (CRESTOR) 10 MG tablet per anesthesia guidelines

## 2024-10-24 NOTE — ASSESSMENT & PLAN NOTE
Blood sugar was minimal elevation.  A1c was fantastic.  No changes.    Orders:    Comprehensive metabolic panel; Future

## 2024-10-24 NOTE — Clinical Note
Morning. Not sure if this physical would take the place of the 1 that was scheduled for Sanjay, but at this point he appears to be quite reasonable for the planned surgery. Reviewed RCRI, as well as his METS. Should be quite reasonable for him to proceed.  I did ask him to hold the meloxicam for approximately 1 week prior to surgery.  If he would not need the preop from the [facility], could you let him know to cancel?  Thanks again.

## 2024-10-28 ENCOUNTER — TELEPHONE (OUTPATIENT)
Dept: OBGYN CLINIC | Facility: HOSPITAL | Age: 54
End: 2024-10-28

## 2024-10-28 ENCOUNTER — TELEPHONE (OUTPATIENT)
Dept: FAMILY MEDICINE CLINIC | Facility: CLINIC | Age: 54
End: 2024-10-28

## 2024-10-28 NOTE — PRE-PROCEDURE INSTRUCTIONS
Pre-Surgery Instructions:   Medication Instructions    ascorbic acid (VITAMIN C) 500 MG tablet Hold day of surgery.    Cholecalciferol (VITAMIN D3) 1,000 units tablet Hold day of surgery.    colchicine (COLCRYS) 0.6 mg tablet Uses PRN- OK to take day of surgery    folic acid (FOLVITE) 1 mg tablet Hold day of surgery.    meloxicam (Mobic) 15 mg tablet Stop taking 3 days prior to surgery.    Multiple Vitamin (multivitamin) tablet Hold day of surgery.    rosuvastatin (CRESTOR) 10 MG tablet Take day of surgery.    Medication instructions for day surgery reviewed. Please use only a sip of water to take your instructed medications. Avoid all over the counter vitamins, supplements and NSAIDS for one week prior to surgery per anesthesia guidelines. Tylenol is ok to take as needed.     You will receive a call one business day prior to surgery with an arrival time and hospital directions. If your surgery is scheduled on a Monday, the hospital will be calling you on the Friday prior to your surgery. If you have not heard from anyone by 8pm, please call the hospital supervisor through the hospital  at [phone]. ([place] [phone] or [place] [phone]).    Do not eat or drink anything after midnight the night before your surgery, including candy, mints, lifesavers, or chewing gum. Do not drink alcohol 24hrs before your surgery. Try not to smoke at least 24hrs before your surgery.       Follow the pre surgery showering instructions as listed in the “My Surgical Experience Booklet” or otherwise provided by your surgeon's office. Do not use a blade to shave the surgical area 1 week before surgery. It is okay to use a clean electric clippers up to 24 hours before surgery. Do not apply any lotions, creams, including makeup, cologne, deodorant, or perfumes after showering on the day of your surgery. Do not use dry shampoo, hair spray, hair gel, or any type of hair products.     No contact lenses, eye make-up, or  artificial eyelashes. Remove nail polish, including gel polish, and any artificial, gel, or acrylic nails if possible. Remove all jewelry including rings and body piercing jewelry.     Wear causal clothing that is easy to take on and off. Consider your type of surgery.    Keep any valuables, jewelry, piercings at home. Please bring any specially ordered equipment (sling, braces) if indicated.    Arrange for a responsible person to drive you to and from the hospital on the day of your surgery. Please confirm the visitor policy for the day of your procedure when you receive your phone call with an arrival time.     Call the surgeon's office with any new illnesses, exposures, or additional questions prior to surgery.    Please reference your “My Surgical Experience Booklet” for additional information to prepare for your upcoming surgery.

## 2024-10-28 NOTE — TELEPHONE ENCOUNTER
Mark     Pt states his primary reviewed preop lab and EKG. States he has cleared him for surgery.     Note from primary for your review in letters.       Please advise if Preop appt with [facility] can be cancelled.       Phone:   [phone]

## 2024-10-28 NOTE — TELEPHONE ENCOUNTER
----- Message from DELLA Bass sent at 10/28/2024  3:49 PM EDT -----  I reviewed his labs/EKG/weight and he is good to go. I'll let the schedulers know that they can use the PCP clearance for surgery.  ----- Message -----  From: Teresa Mcdonald PA-C  Sent: 10/28/2024   9:10 AM EDT  To: DELLA Bass    Good Day ,   The attached pt ( my  ;-) saw his PCP for his 6 month chronic condition f/u and PCP did a pre op as well. I just wanted to make sure the completed pre op is acceptable for pts upcoming knee surgery and that he can cancel his apt with you for pre op this Thursday?  Thank you!  Teresa

## 2024-10-31 ENCOUNTER — ANESTHESIA EVENT (OUTPATIENT)
Age: 54
End: 2024-10-31

## 2024-10-31 ENCOUNTER — ANESTHESIA (OUTPATIENT)
Age: 54
End: 2024-10-31

## 2024-11-02 DIAGNOSIS — M17.11 PRIMARY OSTEOARTHRITIS OF RIGHT KNEE: ICD-10-CM

## 2024-11-04 RX ORDER — MELOXICAM 15 MG/1
15 TABLET ORAL DAILY
Qty: 30 TABLET | Refills: 5 | Status: SHIPPED | OUTPATIENT
Start: 2024-11-04 | End: 2024-11-14

## 2024-11-06 ENCOUNTER — EVALUATION (OUTPATIENT)
Dept: PHYSICAL THERAPY | Facility: CLINIC | Age: 54
End: 2024-11-06
Payer: COMMERCIAL

## 2024-11-06 DIAGNOSIS — M17.11 PRIMARY OSTEOARTHRITIS OF RIGHT KNEE: Primary | ICD-10-CM

## 2024-11-06 PROCEDURE — 97110 THERAPEUTIC EXERCISES: CPT | Performed by: PHYSICAL THERAPIST

## 2024-11-06 PROCEDURE — 97161 PT EVAL LOW COMPLEX 20 MIN: CPT | Performed by: PHYSICAL THERAPIST

## 2024-11-06 NOTE — PROGRESS NOTES
PT Evaluation     Today's date: 2024  Patient name: Gil Mcdonald  : 1970  MRN: [id number]  Referring provider: Jennifer Jeffries PA-C  Dx:   Encounter Diagnosis     ICD-10-CM    1. Primary osteoarthritis of right knee  M17.11 Ambulatory referral to Physical Therapy                     Assessment  Impairments: abnormal gait, abnormal or restricted ROM, abnormal movement, activity intolerance, impaired balance, impaired physical strength, pain with function and weight-bearing intolerance    Assessment details: Gil Mcdonald is a  54 y.o. male presenting to PT for 1 pre-operative visit for a right TKA.  Risk Assessment and Prediction Tool results reviewed. Patient reported functional outcome scores reviewed. Discussed DOS and patient's questions were answered to patient's satisfaction. Mobility/ROM results per above. Strength results per above. Balance/Gait (including Timed Up & Go) per above. Virtual Home Assessment was reviewed with patient. Home Preparation Checklist was reviewed with patient including identification of care partner and encouragement of single level set-up. Post-operative pain management expectations discussed to the patient's satisfaction. Post-operative gait training for level ground, stairs, and car transfers was performed. Patient demonstrated competence with immediate post-operative home exercise program.  Expectation is that patient will be d/c to home with outpatient PT and will benefit from PT to address gait/strength dysfunction along with knee Range of Motion deficits to return to Prior level of functioning.    Understanding of Dx/Px/POC: excellent     Prognosis: excellent    Goals  Short Term Goals: to be achieved by 4 weeks  1) Patient will be independent with initial HEP  2) Decrease pain at worst by 2 points on NPRS  3) Increase knee flexion PROM to 100 degrees  4) Increase knee extension PROM to < or equal to +10 degrees  5) Increase LE strength by 1/2 MMT grade in  all deficient planes  6) Patient to negotiate steps with a reciprocal pattern with use of handrail    Long Term Goals: to be achieved by discharge  1) Increase FOTO score > or equal to expected outcome  2) Patient to be independent with comprehensive HEP  3) Abolish pain for improved quality of life  4) Achieve full knee extension ROM to improve a/iadls  5) Increase knee flexion ROM to > or equal to 120 degrees  6) Patient to negotiate steps with a reciprocal pattern without use of handrail  7) Patient to report no sleep interruption secondary to pain    Plan  Patient would benefit from: skilled PT    Planned therapy interventions: joint mobilization, manual therapy, neuromuscular re-education, patient education, strengthening, stretching, therapeutic activities, therapeutic exercise, home exercise program, gait training, functional ROM exercises, balance/weight bearing training and ADL training    Frequency: 2x week  Duration in weeks: 8  Treatment plan discussed with: patient        Subjective Evaluation    History of Present Illness  Mechanism of injury: Patient reports to outpatient PT for a pre-op appointment regarding a scheduled unilateral compartment knee replacement scheduled for 2024.  Patient describes the pain as achy which is worse with squatting, walking on even surfaces and stair navigation, walking down hills and improved with rest.  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  Patients goals for PT are to decrease the pain and return to PLOF including pickle ball.            Recurrent probem    Patient Goals  Patient goals for therapy: increased strength, independence with ADLs/IADLs, return to sport/leisure activities, increased motion and decreased pain    Pain  Current pain ratin  At best pain ratin  At worst pain ratin  Quality: dull ache and sharp  Relieving factors: rest and  relaxation  Aggravating factors: standing, walking and stair climbing  Progression: worsening    Social Support  Steps to enter house: yes (2 steps)  Stairs in house: yes   Lives in: multiple-level home  Lives with: spouse    Employment status: working  Hand dominance: right      Diagnostic Tests  X-ray: abnormal  MRI studies: abnormal        Objective     Tenderness     Right Knee   Tenderness in the medial joint line. No tenderness in the lateral joint line.     Active Range of Motion     Right Knee   Flexion: 130 degrees   Extension: 0 degrees     Passive Range of Motion     Right Knee   Flexion: 135 degrees with pain  Extension: 0 degrees     Strength/Myotome Testing     Left Knee   Flexion: 5  Extension: 5    Right Knee   Flexion: 4+  Extension: 4+  Quadriceps contraction: good    Ambulation     Ambulation: Level Surfaces     Additional Level Surfaces Ambulation Details  Gait assessment: Slow gait with mild reduction in weight bearing without an assistive device    Functional tests:    TUG = 10.98 seconds without AD  5XSTS: 14.43 seconds without UE push off             Precautions:   Patient Active Problem List   Diagnosis    Hyperdynamic circulation    Hyperlipidemia    Anxiety    PAC (premature atrial contraction)    Hyperglycemia    Gout    Primary osteoarthritis of right knee           Manuals 11/6            R knee PROM             R patellar mobs             R scar mobs                          Neuro Re-Ed                                                                                                        Ther Ex             Recumbent bike rocking for ROM             Pre-op HEP review performed            Quad sets             Heel slides             SAQ             LAQ             Leg press                                                                 Ther Activity             Stair navigation             Sit to stand training             Gait training                          Modalities

## 2024-11-12 NOTE — DISCHARGE INSTR - AVS FIRST PAGE
Dr. Flores Knee Replacement    What to Expect/Activity  It is normal to have some discomfort in your knee for several days to weeks.  You are weight bearing as tolerated to your operative leg with assist devices.  Please use crutches/walker when ambulating until your follow-up  Swelling and discomfort in the knee is normal for several days after surgery. For the first 2-3 days, use ice around the knee to help. Use for 20-30 minutes every 1-2 hours for 48 hours, while awake. You may continue beyond 48 hours as needed.  Place one or two pillows underneath your calf, not your knee, to reduce swelling.  Physical therapy on your own at home should start as soon as possible (see below). Please perform heel slides and extension exercises on your own as well (see diagram).  Please use incentive spirometer 10 times per hour while awake (see diagram).    Dressing/Wound Care/Bathing  You may remove your toe-to-groin dressing 24 hours after surgery. There will be a surgical dressing over your incision that stays in place until follow-up unless water gets under the bandage and then it should be removed.   You may start showering 24 hours after surgery, the surgical dressing will remain in place. Please pat the dressing dry. If you notice the dressing appears saturated or is starting to come off, please replace with dry dressing.  You can keep the dressing in place until follow-up in the office.   Do not place any creams, ointments or gels on or around the incision.  No baths, swimming or submerging until cleared by Dr. Flores    Pain Management/Medications  You may resume your usual medications.  Please take the following medications:  Anti-coagulation (blood clot prevention) - aspirin 81mg twice daily for 4 weeks  Pain medication:  Narcotic: Take as directed  NSAID/Anti-inflammatory: Take as directed  Tylenol 1000mg every 8 hours  Zofran (ondasetron) - 4mg every 8 hours as needed for nausea  Stool softeners (senna/colace) -  take daily to prevent constipation as narcotic pain medication causes constipation  Antibiotic - take as directed if prescribed   If you have questions or pain concerns, please contact the office. Pain medication cannot remove all post-operative pain.    Follow up/Call if:  The findings of your surgery will be explained to you and your family immediately after surgery. However, in the post-operative period, during recovery from anesthesia you may not fully remember or fully understand what was said. This will be again gone over when you return for your post-op appointment.  Please contact Dr. Flores's office if you experience the following:  Excessive bleeding (bleeding through your dressing)  Fever greater than 101 degrees F after 48 hours (low grade fevers the day or two after surgery are normal)  Persistent nausea or vomiting  Decreased sensation or discoloration of the operative limb  Pain or swelling that is getting worse and not better with medication    Dr. Flores's Office Contact: [phone]

## 2024-11-13 ENCOUNTER — TELEPHONE (OUTPATIENT)
Dept: OBGYN CLINIC | Facility: HOSPITAL | Age: 54
End: 2024-11-13

## 2024-11-13 ENCOUNTER — ANESTHESIA EVENT (OUTPATIENT)
Age: 54
End: 2024-11-13
Payer: COMMERCIAL

## 2024-11-14 ENCOUNTER — HOSPITAL ENCOUNTER (OUTPATIENT)
Age: 54
Setting detail: OUTPATIENT SURGERY
Discharge: HOME | End: 2024-11-14
Attending: STUDENT IN AN ORGANIZED HEALTH CARE EDUCATION/TRAINING PROGRAM | Admitting: STUDENT IN AN ORGANIZED HEALTH CARE EDUCATION/TRAINING PROGRAM
Payer: COMMERCIAL

## 2024-11-14 ENCOUNTER — ANESTHESIA (OUTPATIENT)
Age: 54
End: 2024-11-14
Payer: COMMERCIAL

## 2024-11-14 ENCOUNTER — APPOINTMENT (OUTPATIENT)
Age: 54
End: 2024-11-14
Payer: COMMERCIAL

## 2024-11-14 VITALS
HEART RATE: 59 BPM | HEIGHT: 70 IN | BODY MASS INDEX: 27.8 KG/M2 | WEIGHT: 194.2 LBS | OXYGEN SATURATION: 99 % | TEMPERATURE: 97.6 F | SYSTOLIC BLOOD PRESSURE: 115 MMHG | DIASTOLIC BLOOD PRESSURE: 84 MMHG | RESPIRATION RATE: 9 BRPM

## 2024-11-14 DIAGNOSIS — M17.11 PRIMARY OSTEOARTHRITIS OF RIGHT KNEE: Primary | ICD-10-CM

## 2024-11-14 PROBLEM — I10 ESSENTIAL HYPERTENSION: Status: ACTIVE | Noted: 2021-03-08

## 2024-11-14 LAB — GLUCOSE SERPL-MCNC: 66 MG/DL (ref 65–140)

## 2024-11-14 PROCEDURE — 82948 REAGENT STRIP/BLOOD GLUCOSE: CPT

## 2024-11-14 PROCEDURE — 97162 PT EVAL MOD COMPLEX 30 MIN: CPT | Performed by: PHYSICAL THERAPIST

## 2024-11-14 PROCEDURE — 73560 X-RAY EXAM OF KNEE 1 OR 2: CPT

## 2024-11-14 PROCEDURE — 97166 OT EVAL MOD COMPLEX 45 MIN: CPT

## 2024-11-14 PROCEDURE — C9290 INJ, BUPIVACAINE LIPOSOME: HCPCS | Performed by: ANESTHESIOLOGY

## 2024-11-14 PROCEDURE — C1776 JOINT DEVICE (IMPLANTABLE): HCPCS | Performed by: STUDENT IN AN ORGANIZED HEALTH CARE EDUCATION/TRAINING PROGRAM

## 2024-11-14 PROCEDURE — 27446 REVISION OF KNEE JOINT: CPT | Performed by: PHYSICIAN ASSISTANT

## 2024-11-14 PROCEDURE — 27446 REVISION OF KNEE JOINT: CPT | Performed by: STUDENT IN AN ORGANIZED HEALTH CARE EDUCATION/TRAINING PROGRAM

## 2024-11-14 PROCEDURE — C1713 ANCHOR/SCREW BN/BN,TIS/BN: HCPCS | Performed by: STUDENT IN AN ORGANIZED HEALTH CARE EDUCATION/TRAINING PROGRAM

## 2024-11-14 DEVICE — TWIN PEG FEMORAL
Type: IMPLANTABLE DEVICE | Site: KNEE | Status: FUNCTIONAL
Brand: OXFORD PARTIAL KNEE SYSTEM

## 2024-11-14 DEVICE — ANATOMIC MENISCAL BEARINGRIGHT MEDIAL
Type: IMPLANTABLE DEVICE | Site: KNEE | Status: FUNCTIONAL
Brand: OXFORD PARTIAL KNEE SYSTEM

## 2024-11-14 DEVICE — IMPLANTABLE DEVICE
Type: IMPLANTABLE DEVICE | Site: KNEE | Status: FUNCTIONAL
Brand: OXFORD PARTIAL KNEE SYSTEM

## 2024-11-14 DEVICE — SMARTSET HIGH PERFORMANCE MV MEDIUM VISCOSITY BONE CEMENT 40G
Type: IMPLANTABLE DEVICE | Site: KNEE | Status: FUNCTIONAL
Brand: SMARTSET

## 2024-11-14 RX ORDER — ACETAMINOPHEN 325 MG/1
975 TABLET ORAL EVERY 8 HOURS
Status: DISCONTINUED | OUTPATIENT
Start: 2024-11-14 | End: 2024-11-14 | Stop reason: HOSPADM

## 2024-11-14 RX ORDER — FENTANYL CITRATE/PF 50 MCG/ML
25 SYRINGE (ML) INJECTION
Status: DISCONTINUED | OUTPATIENT
Start: 2024-11-14 | End: 2024-11-14 | Stop reason: HOSPADM

## 2024-11-14 RX ORDER — ONDANSETRON 4 MG/1
4 TABLET, ORALLY DISINTEGRATING ORAL EVERY 6 HOURS PRN
Qty: 20 TABLET | Refills: 0 | Status: SHIPPED | OUTPATIENT
Start: 2024-11-14

## 2024-11-14 RX ORDER — ACETAMINOPHEN 500 MG
1000 TABLET ORAL EVERY 8 HOURS
Qty: 60 TABLET | Refills: 0 | Status: SHIPPED | OUTPATIENT
Start: 2024-11-14

## 2024-11-14 RX ORDER — LIDOCAINE HYDROCHLORIDE 10 MG/ML
INJECTION, SOLUTION EPIDURAL; INFILTRATION; INTRACAUDAL; PERINEURAL AS NEEDED
Status: DISCONTINUED | OUTPATIENT
Start: 2024-11-14 | End: 2024-11-14

## 2024-11-14 RX ORDER — AMOXICILLIN 250 MG
1 CAPSULE ORAL DAILY
Qty: 30 TABLET | Refills: 0 | Status: SHIPPED | OUTPATIENT
Start: 2024-11-14

## 2024-11-14 RX ORDER — PROPOFOL 10 MG/ML
INJECTION, EMULSION INTRAVENOUS CONTINUOUS PRN
Status: DISCONTINUED | OUTPATIENT
Start: 2024-11-14 | End: 2024-11-14

## 2024-11-14 RX ORDER — SODIUM CHLORIDE, SODIUM LACTATE, POTASSIUM CHLORIDE, CALCIUM CHLORIDE 600; 310; 30; 20 MG/100ML; MG/100ML; MG/100ML; MG/100ML
100 INJECTION, SOLUTION INTRAVENOUS CONTINUOUS
Status: DISCONTINUED | OUTPATIENT
Start: 2024-11-14 | End: 2024-11-14 | Stop reason: HOSPADM

## 2024-11-14 RX ORDER — DEXAMETHASONE SODIUM PHOSPHATE 10 MG/ML
INJECTION, SOLUTION INTRAMUSCULAR; INTRAVENOUS AS NEEDED
Status: DISCONTINUED | OUTPATIENT
Start: 2024-11-14 | End: 2024-11-14

## 2024-11-14 RX ORDER — HYDROMORPHONE HCL/PF 1 MG/ML
0.4 SYRINGE (ML) INJECTION
Status: DISCONTINUED | OUTPATIENT
Start: 2024-11-14 | End: 2024-11-14 | Stop reason: HOSPADM

## 2024-11-14 RX ORDER — CALCIUM CARBONATE 500 MG/1
1000 TABLET, CHEWABLE ORAL DAILY PRN
Status: DISCONTINUED | OUTPATIENT
Start: 2024-11-14 | End: 2024-11-14 | Stop reason: HOSPADM

## 2024-11-14 RX ORDER — OXYCODONE HYDROCHLORIDE 5 MG/1
5 TABLET ORAL EVERY 4 HOURS PRN
Qty: 42 TABLET | Refills: 0 | Status: SHIPPED | OUTPATIENT
Start: 2024-11-14 | End: 2024-11-24

## 2024-11-14 RX ORDER — BUPIVACAINE HYDROCHLORIDE 5 MG/ML
INJECTION, SOLUTION EPIDURAL; INTRACAUDAL
Status: COMPLETED | OUTPATIENT
Start: 2024-11-14 | End: 2024-11-14

## 2024-11-14 RX ORDER — CEFAZOLIN SODIUM 2 G/50ML
2000 SOLUTION INTRAVENOUS EVERY 8 HOURS
Status: DISCONTINUED | OUTPATIENT
Start: 2024-11-14 | End: 2024-11-14 | Stop reason: HOSPADM

## 2024-11-14 RX ORDER — SIMETHICONE 80 MG
80 TABLET,CHEWABLE ORAL 4 TIMES DAILY PRN
Status: DISCONTINUED | OUTPATIENT
Start: 2024-11-14 | End: 2024-11-14 | Stop reason: HOSPADM

## 2024-11-14 RX ORDER — OXYCODONE HYDROCHLORIDE 10 MG/1
10 TABLET ORAL EVERY 4 HOURS PRN
Status: DISCONTINUED | OUTPATIENT
Start: 2024-11-14 | End: 2024-11-14 | Stop reason: HOSPADM

## 2024-11-14 RX ORDER — TRANEXAMIC ACID 10 MG/ML
1000 INJECTION, SOLUTION INTRAVENOUS ONCE
Status: COMPLETED | OUTPATIENT
Start: 2024-11-14 | End: 2024-11-14

## 2024-11-14 RX ORDER — ONDANSETRON 2 MG/ML
INJECTION INTRAMUSCULAR; INTRAVENOUS AS NEEDED
Status: DISCONTINUED | OUTPATIENT
Start: 2024-11-14 | End: 2024-11-14

## 2024-11-14 RX ORDER — SENNOSIDES 8.6 MG
1 TABLET ORAL DAILY
Status: DISCONTINUED | OUTPATIENT
Start: 2024-11-14 | End: 2024-11-14 | Stop reason: HOSPADM

## 2024-11-14 RX ORDER — CHLORHEXIDINE GLUCONATE ORAL RINSE 1.2 MG/ML
15 SOLUTION DENTAL ONCE
Status: COMPLETED | OUTPATIENT
Start: 2024-11-14 | End: 2024-11-14

## 2024-11-14 RX ORDER — MORPHINE SULFATE 10 MG/ML
2 INJECTION, SOLUTION INTRAMUSCULAR; INTRAVENOUS EVERY 2 HOUR PRN
Status: DISCONTINUED | OUTPATIENT
Start: 2024-11-14 | End: 2024-11-14 | Stop reason: HOSPADM

## 2024-11-14 RX ORDER — DOCUSATE SODIUM 100 MG/1
100 CAPSULE, LIQUID FILLED ORAL 2 TIMES DAILY
Status: DISCONTINUED | OUTPATIENT
Start: 2024-11-14 | End: 2024-11-14 | Stop reason: HOSPADM

## 2024-11-14 RX ORDER — CELECOXIB 200 MG/1
200 CAPSULE ORAL 2 TIMES DAILY
Qty: 60 CAPSULE | Refills: 0 | Status: SHIPPED | OUTPATIENT
Start: 2024-11-14 | End: 2024-12-05

## 2024-11-14 RX ORDER — ONDANSETRON 2 MG/ML
4 INJECTION INTRAMUSCULAR; INTRAVENOUS ONCE AS NEEDED
Status: DISCONTINUED | OUTPATIENT
Start: 2024-11-14 | End: 2024-11-14 | Stop reason: HOSPADM

## 2024-11-14 RX ORDER — ONDANSETRON 2 MG/ML
4 INJECTION INTRAMUSCULAR; INTRAVENOUS EVERY 6 HOURS PRN
Status: DISCONTINUED | OUTPATIENT
Start: 2024-11-14 | End: 2024-11-14 | Stop reason: HOSPADM

## 2024-11-14 RX ORDER — OXYCODONE HYDROCHLORIDE 5 MG/1
5 TABLET ORAL EVERY 4 HOURS PRN
Status: DISCONTINUED | OUTPATIENT
Start: 2024-11-14 | End: 2024-11-14 | Stop reason: HOSPADM

## 2024-11-14 RX ORDER — ASCORBIC ACID 500 MG
500 TABLET ORAL 2 TIMES DAILY
Status: DISCONTINUED | OUTPATIENT
Start: 2024-11-14 | End: 2024-11-14 | Stop reason: HOSPADM

## 2024-11-14 RX ORDER — MAGNESIUM HYDROXIDE 1200 MG/15ML
LIQUID ORAL AS NEEDED
Status: DISCONTINUED | OUTPATIENT
Start: 2024-11-14 | End: 2024-11-14 | Stop reason: HOSPADM

## 2024-11-14 RX ORDER — GABAPENTIN 300 MG/1
300 CAPSULE ORAL
Status: DISCONTINUED | OUTPATIENT
Start: 2024-11-14 | End: 2024-11-14 | Stop reason: HOSPADM

## 2024-11-14 RX ORDER — MIDAZOLAM HYDROCHLORIDE 2 MG/2ML
2 INJECTION, SOLUTION INTRAMUSCULAR; INTRAVENOUS ONCE
Status: COMPLETED | OUTPATIENT
Start: 2024-11-14 | End: 2024-11-14

## 2024-11-14 RX ORDER — ACETAMINOPHEN 325 MG/1
975 TABLET ORAL ONCE
Status: COMPLETED | OUTPATIENT
Start: 2024-11-14 | End: 2024-11-14

## 2024-11-14 RX ORDER — ASPIRIN 81 MG/1
81 TABLET ORAL 2 TIMES DAILY
Qty: 60 TABLET | Refills: 0 | Status: SHIPPED | OUTPATIENT
Start: 2024-11-14 | End: 2024-12-05

## 2024-11-14 RX ORDER — CEFADROXIL 500 MG/1
500 CAPSULE ORAL EVERY 12 HOURS SCHEDULED
Qty: 10 CAPSULE | Refills: 0 | Status: SHIPPED | OUTPATIENT
Start: 2024-11-14 | End: 2024-11-19

## 2024-11-14 RX ORDER — ACETAMINOPHEN 325 MG/1
650 TABLET ORAL EVERY 4 HOURS PRN
Status: DISCONTINUED | OUTPATIENT
Start: 2024-11-14 | End: 2024-11-14 | Stop reason: HOSPADM

## 2024-11-14 RX ORDER — CEFAZOLIN SODIUM 2 G/50ML
2000 SOLUTION INTRAVENOUS ONCE
Status: COMPLETED | OUTPATIENT
Start: 2024-11-14 | End: 2024-11-14

## 2024-11-14 RX ORDER — PRAVASTATIN SODIUM 80 MG/1
80 TABLET ORAL
Status: CANCELLED | OUTPATIENT
Start: 2024-11-14

## 2024-11-14 RX ORDER — SODIUM CHLORIDE, SODIUM LACTATE, POTASSIUM CHLORIDE, CALCIUM CHLORIDE 600; 310; 30; 20 MG/100ML; MG/100ML; MG/100ML; MG/100ML
125 INJECTION, SOLUTION INTRAVENOUS CONTINUOUS
Status: DISCONTINUED | OUTPATIENT
Start: 2024-11-14 | End: 2024-11-14 | Stop reason: HOSPADM

## 2024-11-14 RX ORDER — ASPIRIN 81 MG/1
81 TABLET ORAL 2 TIMES DAILY
Status: DISCONTINUED | OUTPATIENT
Start: 2024-11-14 | End: 2024-11-14 | Stop reason: HOSPADM

## 2024-11-14 RX ORDER — FOLIC ACID 1 MG/1
1 TABLET ORAL DAILY
Status: DISCONTINUED | OUTPATIENT
Start: 2024-11-14 | End: 2024-11-14 | Stop reason: HOSPADM

## 2024-11-14 RX ORDER — CHLORHEXIDINE GLUCONATE 40 MG/ML
SOLUTION TOPICAL DAILY PRN
Status: DISCONTINUED | OUTPATIENT
Start: 2024-11-14 | End: 2024-11-14 | Stop reason: HOSPADM

## 2024-11-14 RX ORDER — PANTOPRAZOLE SODIUM 40 MG/1
40 TABLET, DELAYED RELEASE ORAL
Status: DISCONTINUED | OUTPATIENT
Start: 2024-11-15 | End: 2024-11-14 | Stop reason: HOSPADM

## 2024-11-14 RX ADMIN — PROPOFOL 80 MCG/KG/MIN: 10 INJECTION, EMULSION INTRAVENOUS at 13:43

## 2024-11-14 RX ADMIN — ONDANSETRON 4 MG: 2 INJECTION INTRAMUSCULAR; INTRAVENOUS at 13:48

## 2024-11-14 RX ADMIN — TRANEXAMIC ACID 1000 MG: 10 INJECTION, SOLUTION INTRAVENOUS at 13:41

## 2024-11-14 RX ADMIN — ACETAMINOPHEN 325MG 975 MG: 325 TABLET ORAL at 11:22

## 2024-11-14 RX ADMIN — BUPIVACAINE HYDROCHLORIDE 10 ML: 5 INJECTION, SOLUTION EPIDURAL; INTRACAUDAL; PERINEURAL at 12:50

## 2024-11-14 RX ADMIN — DEXAMETHASONE SODIUM PHOSPHATE 10 MG: 10 INJECTION INTRAMUSCULAR; INTRAVENOUS at 13:48

## 2024-11-14 RX ADMIN — BUPIVACAINE 20 ML: 13.3 INJECTION, SUSPENSION, LIPOSOMAL INFILTRATION at 12:50

## 2024-11-14 RX ADMIN — SODIUM CHLORIDE, SODIUM LACTATE, POTASSIUM CHLORIDE, AND CALCIUM CHLORIDE 100 ML/HR: .6; .31; .03; .02 INJECTION, SOLUTION INTRAVENOUS at 15:23

## 2024-11-14 RX ADMIN — MEPIVACAINE HYDROCHLORIDE 3 ML: 15 INJECTION, SOLUTION EPIDURAL; INFILTRATION at 13:35

## 2024-11-14 RX ADMIN — LIDOCAINE HYDROCHLORIDE 5 ML: 10 SOLUTION INTRAVENOUS at 13:39

## 2024-11-14 RX ADMIN — SODIUM CHLORIDE, SODIUM LACTATE, POTASSIUM CHLORIDE, AND CALCIUM CHLORIDE 125 ML/HR: .6; .31; .03; .02 INJECTION, SOLUTION INTRAVENOUS at 11:20

## 2024-11-14 RX ADMIN — CHLORHEXIDINE GLUCONATE 15 ML: 1.2 RINSE ORAL at 11:23

## 2024-11-14 RX ADMIN — CEFAZOLIN SODIUM 2000 MG: 2 SOLUTION INTRAVENOUS at 13:30

## 2024-11-14 RX ADMIN — MIDAZOLAM 2 MG: 1 INJECTION INTRAMUSCULAR; INTRAVENOUS at 12:50

## 2024-11-14 NOTE — OP NOTE
OPERATIVE REPORT  PATIENT NAME: Gil Mcdonald  : 1970  MRN: [id number]  Pt Location:  [facility] OR ROOM 05    Surgery Date: 2024    Surgeons and Role:     * Gil Flores, DO - Primary     * Jennifer Jeffries PA-C - Assisting     Preop Diagnosis:  Primary osteoarthritis of right knee [M17.11]    Post-Op Diagnosis Codes:     * Primary osteoarthritis of right knee [M17.11]    Procedure(s):  Right - ARTHROPLASTY KNEE UNICOMPARTMENTAL SAME DAY    Specimens:  * No specimens in log *    Estimated Blood Loss:   25 cc    Drains:  * No LDAs found *    Anesthesia Type:   Spinal      Operative Indications:  Primary osteoarthritis of right knee [M17.11]    55 y/o male with right knee pain secondary to severe medial compartment right knee osteoarthritis. He has failed extensive non-operative management. On exam his pain is localized to the medial joint line with varus alignment and good stability of his ACL. Xrays and MRI of the right knee reviewed showing that his arthritis is localized to the medial compartment and his ACL appears to be intact. The patient has elected to proceed with Right medial UKA vs TKA. Risks and benefits of surgery to include but not limited to bleeding, infection, damage to surrounding structures, hardware failure, instability, fracture, dislocation, need for further surgery, continued pain, stiffness, blood clots, stroke, and heart attack was discussed with the patient.     Operative Findings:  Grade IV changes MFC  Grade IV changes anteromedial tibia  No discernable wear PFJ or lateral compartment  ACL/PCL intact  Small cartilage pieces floating within synovial fluid from macerated anterior MFC    Implant Name Type Inv. Item Serial No.  Lot No. LRB No. Used Action   CEMENT BONE SMART SET GRAY MED VISC - [id number]  CEMENT BONE SMART SET GRAY MED VISC  DEPUY [id number] Right 1 Implanted   COMPONENT FEMORAL CMNT TWN PEG LRG - [id number]  COMPONENT FEMORAL CMNT TWN PEG LRG  [employer] [id number] Right 1 Implanted   INSERT TIBIAL ARTC SZ D  - [id number]  INSERT TIBIAL ARTC SZ D RM  [employer] [id number] Right 1 Implanted   INSERT TIBIAL BRNG LRG SZ 6 RT [place] - [id number]  INSERT TIBIAL BRNG LRG SZ 6 RT [place]  [employer] [id number] Right 1 Implanted     Complications:   None    Knee Technique: Suture (direct) Repair  Knee Approach: Medial Parapatellar, mini    Chronic Narcotic Use:  No      Procedure and Technique:  Patient was seen in the preoperative holding area.  Informed consent was confirmed and all questions were answered. Operative site was confirmed and marked. Patient was taken to the operating room and transferred to the operating room table. Anesthesia was performed. The patient was then placed supine and all bony prominences were well-padded. Right lower extremity was prepped and draped in usual sterile fashion with chlorhexidine scrub.  Patient was given perioperative antibiotics prior to incision and SCDs were placed on the non-operative leg.  A formal time-out was performed identifying the patient and confirmed operative site.  The knee was exsanguinated and a pneumatic tourniquet was inflated at 250 mmHg.  A slightly medial midline incision was performed from 1 fingerbreadth above the patella to the tibial tubercle.  This incision was carried down through skin and subcutaneous tissues to the level of the extensor mechanism.  A small medial skin flap was created.  A mini medial parapatellar approach was performed into the knee being careful to avoid the patellar tendon and intact cartilage. The anterior horn of the medial meniscus was released.  The medial peel was performed to the mid coronal line. At this time the lateral and patellofemoral joints were carefully inspected for wear and found to have no significant wear precluding unicompartmental arthroplasty. Peripheral osteophytes were removed at this time from the medial compartment. The femur was sized to a large per the  patient's demographics and intraoperative measurement. The extramedullary tibial guide was placed perpendicular to the mechanical axis and with appropriate slope. The size 3 G clamp was used to secure the cutting guide. Retractors were placed to protect the ACL and MCL. The tibia was cut with sagittal saw being careful to avoid the ACL and reciprocating saw being careful to avoid the MCL. The tibial cut was loosened with a straight osteotome and removed. The tibial was inspected and confirmed to have isolated anteromedial wear with intact posterior cartilage. The tibia was sized to a D with no posterior or medial overhang. At this time the femoral canal drill was used followed by opening awl and IM yaima. The AP axis of the MFC was marked with a surgical marker. The large size femoral cutting guide was placed and attached to the IM yaima. The alignment was confirmed to be along the AP axis. The proximal and distal holes were drilled. The guide was removed and the size zero spiguet was placed. The distal femur was reamed. The trial was placed. The flexion gap was appropriate with a size 6. The extension gap was then assessed and found to be a 4. At the time the 2 spiguet was placed and the distal femur was reamed again. The nibler was then used to remove peripheral bone. The flexion and extension gaps were then assessed and found to be symmetric with a size 6 insert. At this time the final femoral prep with the rhino reamer and posterior osteotome was completed. At this time the tibia was prepared the appropriate size baseplate was pinned in place. The toothbrush blade was used to cut the trough. This was then cleaned of any residual bone. At this time the knee was again trialed with the above implants and found to have excellent stability with impingement of the bearing along the lateral tibial tray, no bearing spit-out and appropriate alignment. At this time all trials were removed and the knee was copiously irrigated.  The distal femur was prepped with the small finishing drill for cement interdigitation. The bone surfaces were dried. The tibial was cemented in place extruding the cement anteriorly. The tibia was then cleared of all excess cement. The femur was then cemented into place. The was then held at 45 degrees with axial pressure with the appropriate sized spacer block in place until cement was cured. The knee was then trialed with the above bearing and found to have excellent stability. The real size 6 bearing was implanted and the knee was taken through ROM. The tourniquet was released at 37 minutes and all bleeders were coagulated.  The knee was irrigated with the remainder of the 3 L of normal saline solution.  The joint local was injected in the posterior capsule and around the tissues of the knee.  The knee was taken through a final range of motion and found to have excellent stability and patellar tracking.  All instrument and sponge counts were correct x2.  The arthrotomy was closed with #1 Stratafix suture.  Subcutaneous tissues were closed with 2-0 Vicryl.  The skin was closed with 3-0 Stratafix followed by Dermabond.  A sterile Mepilex was placed along with a thigh foot Ace wrap.  Patient was awoken from anesthesia and taken to recovery room in stable condition.     I was present for all critical portions of the procedure., A qualified resident physician was not available., and A physician assistant was required during the procedure for retraction, tissue handling, dissection and suturing.    Patient Disposition:  PACU     54M s/p R medial UKA 11/14  - multi-modal pain control  - ancef pre-op, duricef x 24 hrs as planned same day discharge   - DVT ppx: aspirin 81 mg BID x 4 weeks  - PT/OT  - WBAT  - ROM as tolerated  - f/u 10-14 days       SIGNATURE: Gil Flores,   DATE: November 14, 2024  TIME: 2:59 PM

## 2024-11-14 NOTE — ANESTHESIA PROCEDURE NOTES
Spinal Block    Patient location during procedure: OR  Start time: 11/14/2024 1:35 PM  Reason for block: procedure for pain and at surgeon's request  Staffing  Performed by: Karissa Cullen CRNA  Authorized by: Stepan Stern MD    Preanesthetic Checklist  Completed: patient identified, IV checked, site marked, risks and benefits discussed, surgical consent, monitors and equipment checked, pre-op evaluation and timeout performed  Spinal Block  Patient position: sitting  Prep: ChloraPrep and site prepped and draped  Patient monitoring: frequent blood pressure checks, continuous pulse ox and heart rate  Approach: midline  Location: L3-4  Needle  Needle type: Pencan   Needle gauge: 24 G  Needle length: 4 in  Assessment  Sensory level: T4  Injection Assessment:  negative aspiration for heme, no paresthesia on injection and positive aspiration for clear CSF.  Post-procedure:  site cleaned

## 2024-11-14 NOTE — ANESTHESIA POSTPROCEDURE EVALUATION
Post-Op Assessment Note    CV Status:  Stable      Multimodal analgesia used between 6 hours prior to anesthesia start to PACU discharge    Mental Status:  Alert   Hydration Status:  Stable   PONV Controlled:  None   Airway Patency:  Patent     Post Op Vitals Reviewed: Yes    No anethesia notable event occurred.    Staff: Anesthesiologist           Last Filed PACU Vitals:  Vitals Value Taken Time   Temp     Pulse 56 11/14/24 1537   /65 11/14/24 1530   Resp 17 11/14/24 1537   SpO2 99 % 11/14/24 1537   Vitals shown include unfiled device data.    Modified Leesa:  Activity: 1 (11/14/2024  3:30 PM)  Respiration: 2 (11/14/2024  3:30 PM)  Circulation: 2 (11/14/2024  3:30 PM)  Consciousness: 2 (11/14/2024  3:30 PM)  Oxygen Saturation: 2 (11/14/2024  3:30 PM)  Modified Leesa Score: 9 (11/14/2024  3:30 PM)         Negative

## 2024-11-14 NOTE — PLAN OF CARE
Problem: PHYSICAL THERAPY ADULT  Goal: Performs mobility at highest level of function for planned discharge setting.  See evaluation for individualized goals.  Description: Treatment/Interventions: Functional transfer training, LE strengthening/ROM, Elevations, Therapeutic exercise, Endurance training, Patient/family training, Bed mobility, Gait training, Spoke to nursing, OT, Family  Equipment Recommended: Walker (pt owns)       See flowsheet documentation for full assessment, interventions and recommendations.  Note: Prognosis: Good  Problem List: Decreased strength, Decreased range of motion, Decreased mobility, Impaired balance, Decreased endurance, Orthopedic restrictions, Pain  Assessment: Pt is a 54 y.o. male who presented [facility] on [facility]/14/2024 s/p R Unicompartmental Arthroplasty done by Dr. Flores. Precautions include WBAT. Pt  has a past medical history of Acute medial meniscus tear of left knee (01/11/2023), Clotting disorder (HCC) (4/13/21), Concussion, Concussion (1993), plastic surgery, Hyperlipidemia, Hypertension, Hypocalcemia (05/29/2019), Infectious viral hepatitis, Motion sickness, PONV (postoperative nausea and vomiting), Teeth missing, and Umbilical hernia.. Pt greeted in recliner chair for PT evaluation on 11/14/24. Pt referred to PT for functional mobility evaluation & D/C planning w/ orders of activity beginning POD #0 and activity as tolerated. PTA, pt reports being I w/o AD and I w/ IADLs. Personal factors affecting pt at time of IE include: lives in 2 story house and stairs to enter home. Please find objective findings from PT assessment regarding body systems outlined above with impairments and limitations including weakness, decreased ROM, impaired balance, decreased endurance, gait deviations, pain, decreased activity tolerance, decreased functional mobility tolerance, fall risk, and orthopedic restrictions.  Please see flow sheet above for objective findings and level of assistance  required for safe completion of functional tasks. Pt was able to perform sit<>stand with RW and S. Pt able to ambulate 60'x2 with RW and S. Performed 2 steps with RW and S, cues needed for LE sequencing, but good carryover. Pt demonstrated dec endurance and tolerance to activity. Denies reports of dizziness or SOB t/o session. Patient was left in recliner chair  with call bell and all needs within reach. Pt was educated on fall precautions and reinforced w/ good understanding. Based on pt presentation and impaired function, pt would benefit from level III, (minimal resource intensity) at D/C. From PT/mobility standpoint, pt would benefit from OPPT to address deficits as defined above and maximize level of independence and return pt to PLOF. HEP given and reviewed with patient, no questions at this time. CM to follow. Nsg staff to continue to mobilized pt (OOB in chair for all meals & ambulate in room/unit) as tolerated to prevent further decline in function. Nsg notified. Co-eval performed with OT to complete this evaluation for the pts best interest given pts medical complexity and functional level.  Barriers to Discharge: None     Rehab Resource Intensity Level, PT: III (Minimum Resource Intensity)    See flowsheet documentation for full assessment.

## 2024-11-14 NOTE — ANESTHESIA POSTPROCEDURE EVALUATION
Post-Op Assessment Note    CV Status:  Stable  Pain Score: 0    Pain management: adequate       Mental Status:  Awake and sleepy   Hydration Status:  Euvolemic   PONV Controlled:  Controlled   Airway Patency:  Patent     Post Op Vitals Reviewed: Yes    No anethesia notable event occurred.    Staff: CRNA           Last Filed PACU Vitals:  Vitals Value Taken Time   Temp     Pulse 60    /60    Resp 18    SpO2 100 on 4LNC        Modified Leesa:  No data recorded

## 2024-11-14 NOTE — INTERVAL H&P NOTE
H&P reviewed. After examining the patient I find no changes in the patients condition since the H&P had been written. Plan for right medial UKA vs TKA.

## 2024-11-14 NOTE — ANESTHESIA PREPROCEDURE EVALUATION
Procedure:  ARTHROPLASTY KNEE UNICOMPARTMENTAL vs TKA- SAME DAY (Right: Knee)     - denies any chest pain, palpitations, shortness of breath, syncope, lightheadedness, seizures   - denies any recent infectious symptoms such as fevers, chills, cough   - denies taking any anticoagulation medications or any issues with bleeding, bruising, clotting    EKG (10/21/24):  sinus bradycardia, HR 59bpm, Qtc 401    Relevant Problems   ANESTHESIA (within normal limits)      CARDIO   (+) Essential hypertension   (+) Hyperlipidemia   (+) PAC (premature atrial contraction)      ENDO (within normal limits)      GI/HEPATIC (within normal limits)      /RENAL (within normal limits)      GYN (within normal limits)      HEMATOLOGY (within normal limits)      MUSCULOSKELETAL   (+) Gout   (+) Primary osteoarthritis of right knee      NEURO/PSYCH   (+) Anxiety      PULMONARY (within normal limits)      Lab Results   Component Value Date    WBC 5.82 10/21/2024    HGB 13.8 10/21/2024    HCT 42.8 10/21/2024    MCV 97 10/21/2024     10/21/2024     Lab Results   Component Value Date     06/10/2014    SODIUM 141 10/21/2024    K 4.5 10/21/2024     10/21/2024    CO2 26 10/21/2024    ANIONGAP 11 06/10/2014    AGAP 13 10/21/2024    BUN 17 10/21/2024    CREATININE 0.72 10/21/2024    GLUC 104 11/24/2021    GLUF 116 (H) 10/21/2024    CALCIUM 9.1 10/21/2024    AST 35 10/21/2024    ALT 25 10/21/2024    ALKPHOS 36 10/21/2024    PROT 7.4 06/10/2014    TP 7.0 10/21/2024    BILITOT 0.6 06/10/2014    TBILI 1.09 (H) 10/21/2024    EGFR 105 10/21/2024     Lab Results   Component Value Date    PTT 24 10/21/2024     Lab Results   Component Value Date    INR 1.04 10/21/2024    INR 1.14 04/14/2021    PROTIME 13.9 10/21/2024    PROTIME 14.4 04/14/2021       Physical Exam    Airway    Mallampati score: II  TM Distance: >3 FB  Neck ROM: full     Dental       Cardiovascular  Rhythm: regular, Rate: normal, Cardiovascular exam  normal    Pulmonary  Pulmonary exam normal Breath sounds clear to auscultation    Other Findings        Anesthesia Plan  ASA Score- 2     Anesthesia Type- spinal with ASA Monitors.         Additional Monitors:     Airway Plan:     Comment: Spinal with IV sedation, GA/LMA as back up, preoperative adductor canal nerve block for postoperative analgesia.       Plan Factors-Exercise tolerance (METS): >4 METS.    Chart reviewed. EKG reviewed.  Existing labs reviewed. Patient summary reviewed.    Patient is not a current smoker.  Patient did not smoke on day of surgery.    Obstructive sleep apnea risk education given perioperatively.        Induction- intravenous.    Postoperative Plan- Plan for postoperative opioid use.         Informed Consent- Anesthetic plan and risks discussed with patient.  I personally reviewed this patient with the CRNA. Discussed and agreed on the Anesthesia Plan with the CRNA..
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
## 2024-11-14 NOTE — ANESTHESIA PROCEDURE NOTES
Peripheral Block    Patient location during procedure: holding area  Start time: 11/14/2024 12:50 PM  Reason for block: at surgeon's request and post-op pain management  Staffing  Performed by: Stepan Stern MD  Authorized by: Stepan Stern MD    Preanesthetic Checklist  Completed: patient identified, IV checked, site marked, risks and benefits discussed, surgical consent, monitors and equipment checked, pre-op evaluation and timeout performed  Peripheral Block  Patient position: supine  Prep: ChloraPrep  Patient monitoring: frequent blood pressure checks, continuous pulse oximetry and heart rate  Block type: Adductor Canal  Laterality: right  Injection technique: single-shot  Procedures: ultrasound guided, Ultrasound guidance required for the procedure to increase accuracy and safety of medication placement and decrease risk of complications.  Ultrasound permanent image saved  bupivacaine (PF) (MARCAINE) 0.5 % injection 20 mL - Perineural   10 mL - 11/14/2024 12:50:00 PM  bupivacaine liposomal (EXPAREL) 1.3 % injection 20 mL - Perineural   20 mL - 11/14/2024 12:50:00 PM  Needle  Needle type: Stimuplex   Needle gauge: 20 G  Needle length: 4 in  Needle localization: anatomical landmarks and ultrasound guidance  Assessment  Injection assessment: incremental injection, frequent aspiration, injected with ease, negative aspiration, negative for heart rate change, no paresthesia on injection, no symptoms of intraneural/intravenous injection and needle tip visualized at all times  Paresthesia pain: none  Post-procedure:  site cleaned  patient tolerated the procedure well with no immediate complications

## 2024-11-14 NOTE — PLAN OF CARE
Problem: OCCUPATIONAL THERAPY ADULT  Goal: Performs self-care activities at highest level of function for planned discharge setting.  See evaluation for individualized goals.  Description: Treatment Interventions: ADL retraining, Functional transfer training, Endurance training, Patient/family training, Equipment evaluation/education, Compensatory technique education, Continued evaluation, Energy conservation, Activityengagement          See flowsheet documentation for full assessment, interventions and recommendations.   Note: Limitation: Decreased ADL status, Decreased endurance, Decreased self-care trans, Decreased high-level ADLs  Prognosis: Good  Assessment: Pt is a 54 y.o. male seen for OT evaluation s/p adm to [facility] on 11/14/2024 w/ primary osteoarthritis of right knee. Comorbidities affecting pt’s functional performance include a significant PMH of hyperlipidemia, HTN, hypocalcemia, PONV, clotting disorder, PAC, hyperglycemia, gout. Pt with active OT orders and activity orders for Activity beginning POD #0. WBAT RLE. Pt lives in a multilevel house with 1 DB with spouse. Pt has first floor full bath walk-in shower with seat and grab bars. At baseline, pt was independent with all ADLs/IADLs. Pt completed don of underwear and pants seated in chair with supervision, then standing with use of armrests/RW for stability to then pull pants over waist. Pt completed sit to stand with supervision. Verbal cues for proper technique. Pt completed functional mobility functional household distance with use of RW and supervision. Pt educated on ADLs/IADLs, car transfers, and LE management for independence at home. Upon evaluation, pt currently requires S for UB ADLs, S for LB ADLs, S for toileting, unable to assess for bed mobility, S for functional mobility, and S for transfers 2* the following deficits impacting occupational performance: weakness, decreased strength , decreased balance, decreased activity  tolerance, increased pain, and orthopedic restrictions. These impairments, as well at pt’s personal factors of: DB home environment, steps within home environment, difficulty performing ADLs, difficulty performing IADLs, difficulty performing transfers/mobility, WBS, fall risk , and new use of AD for functional transfers/mobility limit pt’s ability to safely engage in all baseline areas of occupation. Based on the aforementioned OT evaluation, functional performance deficits, and assessments, pt has been identified as a moderate complexity evaluation. Pt to continue to benefit from continued acute OT services during hospital stay to address defined deficits and to maximize level of functional independence in the following Occupational Performance areas: grooming, bathing/shower, toilet hygiene, dressing, health maintenance, functional mobility, community mobility, clothing management, cleaning, meal prep, household maintenance, and job performance/volunteering. From OT standpoint, recommend No post-acute rehabilitation needs upon D/C. OT will continue to follow pt 3-5x/wk.     Rehab Resource Intensity Level, OT: No post-acute rehabilitation needs
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
## 2024-11-15 ENCOUNTER — TELEPHONE (OUTPATIENT)
Dept: OBGYN CLINIC | Facility: HOSPITAL | Age: 54
End: 2024-11-15

## 2024-11-15 NOTE — PROGRESS NOTES
PT Evaluation     Today's date: 2024  Patient name: Gil Mcdonald  : 1970  MRN: [id number]  Referring provider: Jennifer Jeffries PA-C  Dx:   Encounter Diagnosis     ICD-10-CM    1. Primary osteoarthritis of right knee  M17.11                        Assessment  Impairments: abnormal gait, abnormal or restricted ROM, abnormal movement, activity intolerance, impaired balance, impaired physical strength, pain with function and weight-bearing intolerance    Assessment details: Gil Mcdonald is a 54 y.o. male presenting to physical therapy s/p R Unicompartmental Arthroplasty (medial compartment) on 2024 and presents with pain, decreased range of motion, decreased strength, gait/balance dysfunction, and decreased activity tolerance.  Assessment reveals strength, ROM and functional impairment consisted with 4 days post-operative.  Secondary to these impairments, patient has increased difficulty performing ADL's, household chores, and  work related tasks.  Gil would benefit from skilled PT to address these issues and maximize function.  Thank you for the referral.       Understanding of Dx/Px/POC: excellent     Prognosis: excellent    Goals  Short Term Goals: to be achieved by 3 weeks  1) Patient will be independent with initial HEP  2) Decrease pain at worst by 2 points on NPRS  3) Increase L knee PROM to WNL  4) Increase LE strength by 1/2 MMT grade in all deficient planes  5) Patient to negotiate steps with a reciprocal pattern with use of handrail    Long Term Goals: to be achieved by discharge (6 weeks)  1) Increase FOTO score > or equal to expected outcome  2) Patient to be independent with comprehensive HEP  3) Abolish pain for improved quality of life  4) Increase L knee AROM to WNL  5) Patient to negotiate steps with a reciprocal pattern without use of handrail  6) Patient to report no sleep interruption secondary to pain    Plan  Patient would benefit from: skilled PT    Planned therapy  interventions: joint mobilization, manual therapy, neuromuscular re-education, patient education, strengthening, stretching, therapeutic activities, therapeutic exercise, home exercise program, gait training, functional ROM exercises, balance/weight bearing training and ADL training    Frequency: 2x week  Duration in weeks: 6  Treatment plan discussed with: patient        Subjective Evaluation    History of Present Illness  Mechanism of injury: Patient reports to outpatient PT for a pre-op appointment regarding a scheduled unilateral compartment knee replacement scheduled for 2024.  Patient describes the pain as achy which is worse with squatting, walking on even surfaces and stair navigation, walking down hills and improved with rest.  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  Patients goals for PT are to decrease the pain and return to PLOF including pickle ball.      2024: Patient returns to outpatient PT s/p R Unicompartmental Arthroplasty (medial compartment) on 2024.  Patient was d/c'd home without complications.  RTW scheduled for .  Patient describes his post-op pain as achy and tight which is worse with weight bearing activities, laying down and improved with rest, ice and medications.           Recurrent probem    Patient Goals  Patient goals for therapy: increased strength, independence with ADLs/IADLs, return to sport/leisure activities, increased motion and decreased pain    Pain  Current pain rating: 3  At best pain rating: 3  At worst pain ratin  Quality: dull ache and sharp  Relieving factors: rest and relaxation  Aggravating factors: standing, walking and stair climbing  Progression: worsening    Social Support  Steps to enter house: yes (2 steps)  Stairs in house: yes   Lives in: multiple-level home  Lives with: spouse    Employment status: working  Hand dominance:  "right      Diagnostic Tests  X-ray: abnormal  MRI studies: abnormal        Objective     Neurological Testing     Sensation     Knee     Right Knee   Intact: light touch   Diminished: light touch     Comments   Right light touch: medial to incision.     Additional Neurological Details  2+ pitting edema    (+) high risk for DVT per [place] Criteria     Active Range of Motion     Right Knee   Flexion: 110 degrees with pain  Extension: 3 degrees with pain    Passive Range of Motion     Right Knee   Flexion: 118 degrees with pain  Extension: 2 degrees with pain    Strength/Myotome Testing     Left Knee   Flexion: 5  Extension: 5    Right Knee   Flexion: 4+  Extension: 4+  Quadriceps contraction: good    Ambulation     Ambulation: Level Surfaces     Additional Level Surfaces Ambulation Details  Gait assessment: (PRE-op) Slow gait with mild reduction in weight bearing without an assistive device. (POST-op) Slow gait speed with reduction in heel strike but adequate step lengths that are equal using a SPC    Functional tests:    Pre-op:  TUG = 10.98 seconds without AD  5XSTS: 14.43 seconds without UE push off     Post-op:  TUG = 12.96 seconds without UE push off and SPC  5XSTS = 15.63 seconds without UE push off             Precautions:   Patient Active Problem List   Diagnosis    Hyperdynamic circulation    Hyperlipidemia    Anxiety    PAC (premature atrial contraction)    Hyperglycemia    Gout    Primary osteoarthritis of right knee    Essential hypertension           Manuals 11/6 11/18           R knee PROM             R patellar mobs             R scar mobs                          Neuro Re-Ed             Tandem walking             Biodex maze                                                                              Ther Ex             Recumbent bike rocking for ROM             Pre-op HEP review performed            Quad sets  2x10 x5\"           Heel slides  X10 active           SAQ             LAQ  3x10           Leg " press             Mini squats  2x10           HR/TR  3x10 ea.                                     Ther Activity             Stair navigation             Sit to stand training             Gait training                          Modalities

## 2024-11-15 NOTE — TELEPHONE ENCOUNTER
"Patient contacted for a postoperative follow up assessment. Patient reports doing  \"not too bad.\" Patient states current pain level of a  3-4/10  when sitting and and is about the same with movement, using the cane. Patient reports \"a little worse\" in swelling and dressing is clean, dry and intact. Patient is icing the site we discussed postoperative swelling, continuing to ICE areas of pain/swelling, especially after increased activity over the next few weeks. We discussed periods of REST and Elevation while icing between activity sessions.   He has OP PT on Monday.     We reviewed patients AVS medication list. Patient is taking Tylenol 1000mg every 8 hours, Celebrex 200mg BID, Oxycodone 5mg PRN, ASA 81mg BID, Duricef BID, and Senokot daily. Patient has not yet had a BM, and we discussed OTC Miralax if needed over the weekend.  He has Zofran as needed for nausea, denies using thus far.      Patient denies nausea, vomiting, abdominal pain, chest pain, shortness of breath, fever, dizziness and calf pain. Patient does not have any other questions or concerns at this time. Pt was encouraged to call with any questions, concerns or issues.    "

## 2024-11-18 ENCOUNTER — OFFICE VISIT (OUTPATIENT)
Dept: PHYSICAL THERAPY | Facility: CLINIC | Age: 54
End: 2024-11-18
Payer: COMMERCIAL

## 2024-11-18 DIAGNOSIS — M17.11 PRIMARY OSTEOARTHRITIS OF RIGHT KNEE: Primary | ICD-10-CM

## 2024-11-18 DIAGNOSIS — E78.2 MIXED HYPERLIPIDEMIA: ICD-10-CM

## 2024-11-18 PROCEDURE — 97164 PT RE-EVAL EST PLAN CARE: CPT | Performed by: PHYSICAL THERAPIST

## 2024-11-18 PROCEDURE — 97110 THERAPEUTIC EXERCISES: CPT | Performed by: PHYSICAL THERAPIST

## 2024-11-19 RX ORDER — ROSUVASTATIN CALCIUM 10 MG/1
10 TABLET, COATED ORAL DAILY
Qty: 90 TABLET | Refills: 1 | Status: SHIPPED | OUTPATIENT
Start: 2024-11-19

## 2024-11-22 ENCOUNTER — OFFICE VISIT (OUTPATIENT)
Dept: PHYSICAL THERAPY | Facility: CLINIC | Age: 54
End: 2024-11-22
Payer: COMMERCIAL

## 2024-11-22 DIAGNOSIS — M17.11 PRIMARY OSTEOARTHRITIS OF RIGHT KNEE: Primary | ICD-10-CM

## 2024-11-22 PROCEDURE — 97140 MANUAL THERAPY 1/> REGIONS: CPT | Performed by: PHYSICAL THERAPIST

## 2024-11-22 PROCEDURE — 97530 THERAPEUTIC ACTIVITIES: CPT | Performed by: PHYSICAL THERAPIST

## 2024-11-22 PROCEDURE — 97110 THERAPEUTIC EXERCISES: CPT | Performed by: PHYSICAL THERAPIST

## 2024-11-22 NOTE — PROGRESS NOTES
"Daily Note     Today's date: 2024  Patient name: Gil Mcdonald  : 1970  MRN: [id number]  Referring provider: Jennifer Jeffries PA-C  Dx:   Encounter Diagnosis     ICD-10-CM    1. Primary osteoarthritis of right knee  M17.11                      Subjective: Pt reports the knee is just stiff.       Objective: See treatment diary below      Assessment: Pt currently ambulating with SPC. Significant RLE swelling therefore provided pt with tubigrip this session to assist with LE edema. Educated pt on knee flexion ROM and protection of healing current incision at this time. Mild quad lag with SLR flexion with cues to improve quadricep activation and improved carryover. Educated pt on proper gait mechanics without AD.       Plan: Continue per plan of care.      Precautions:   Patient Active Problem List   Diagnosis    Hyperdynamic circulation    Hyperlipidemia    Anxiety    PAC (premature atrial contraction)    Hyperglycemia    Gout    Primary osteoarthritis of right knee    Essential hypertension           Manuals           R knee PROM   AD          R patellar mobs   AD          R scar mobs             Tubigrip    AD          Neuro Re-Ed             Tandem walking             Biodex maze                                                                              Ther Ex             Recumbent bike rocking for ROM   5' L0   full          Pre-op HEP review performed            Quad sets  2x10 x5\" 5\" 3x10           Heel slides  X10 active HEP          SLR flexion    2x10           LAQ  3x10 1#  3x10          Leg press   95#  3x10          Mini squats  2x10 3x10           HR/TR  3x10 ea. HEP          Standing hip abduction    OTB 2x10 ea.                        Ther Activity             Stair navigation   6\" 2x10           Sit to stand training   2x10 from chair           Gait training   5' w/o AD                       Modalities                                            "

## 2024-11-24 ENCOUNTER — HOSPITAL ENCOUNTER (EMERGENCY)
Facility: HOSPITAL | Age: 54
Discharge: HOME | End: 2024-11-24
Attending: EMERGENCY MEDICINE
Payer: COMMERCIAL

## 2024-11-24 ENCOUNTER — APPOINTMENT (EMERGENCY)
Dept: RADIOLOGY | Facility: HOSPITAL | Age: 54
End: 2024-11-24
Payer: COMMERCIAL

## 2024-11-24 VITALS
OXYGEN SATURATION: 100 % | BODY MASS INDEX: 30.08 KG/M2 | TEMPERATURE: 97.9 F | RESPIRATION RATE: 16 BRPM | WEIGHT: 209.66 LBS | DIASTOLIC BLOOD PRESSURE: 82 MMHG | HEART RATE: 61 BPM | SYSTOLIC BLOOD PRESSURE: 124 MMHG

## 2024-11-24 DIAGNOSIS — I10 ESSENTIAL HYPERTENSION: ICD-10-CM

## 2024-11-24 DIAGNOSIS — I47.10 PAROXYSMAL SUPRAVENTRICULAR TACHYCARDIA (HCC): Primary | ICD-10-CM

## 2024-11-24 DIAGNOSIS — R09.89 HYPERDYNAMIC CIRCULATION: ICD-10-CM

## 2024-11-24 DIAGNOSIS — I49.1 PAC (PREMATURE ATRIAL CONTRACTION): ICD-10-CM

## 2024-11-24 DIAGNOSIS — F41.9 ANXIETY: ICD-10-CM

## 2024-11-24 DIAGNOSIS — R73.9 HYPERGLYCEMIA: ICD-10-CM

## 2024-11-24 DIAGNOSIS — M17.11 PRIMARY OSTEOARTHRITIS OF RIGHT KNEE: ICD-10-CM

## 2024-11-24 DIAGNOSIS — I49.9 ARRHYTHMIA: Primary | ICD-10-CM

## 2024-11-24 LAB
2HR DELTA HS TROPONIN: 33 NG/L
ALBUMIN SERPL BCG-MCNC: 3.9 G/DL (ref 3.5–5)
ALP SERPL-CCNC: 41 U/L (ref 34–104)
ALT SERPL W P-5'-P-CCNC: 31 U/L (ref 7–52)
ANION GAP SERPL CALCULATED.3IONS-SCNC: 6 MMOL/L (ref 4–13)
APTT PPP: 25 SECONDS (ref 23–34)
AST SERPL W P-5'-P-CCNC: 69 U/L (ref 13–39)
ATRIAL RATE: 72 BPM
ATRIAL RATE: 78 BPM
BASOPHILS # BLD AUTO: 0.03 THOUSANDS/ΜL (ref 0–0.1)
BASOPHILS NFR BLD AUTO: 0 % (ref 0–1)
BILIRUB DIRECT SERPL-MCNC: 0.1 MG/DL (ref 0–0.2)
BILIRUB SERPL-MCNC: 0.76 MG/DL (ref 0.2–1)
BUN SERPL-MCNC: 28 MG/DL (ref 5–25)
CALCIUM SERPL-MCNC: 9.4 MG/DL (ref 8.4–10.2)
CARDIAC TROPONIN I PNL SERPL HS: 14 NG/L (ref ?–50)
CARDIAC TROPONIN I PNL SERPL HS: 47 NG/L (ref ?–50)
CHLORIDE SERPL-SCNC: 104 MMOL/L (ref 96–108)
CO2 SERPL-SCNC: 28 MMOL/L (ref 21–32)
CREAT SERPL-MCNC: 0.65 MG/DL (ref 0.6–1.3)
EOSINOPHIL # BLD AUTO: 0.32 THOUSAND/ΜL (ref 0–0.61)
EOSINOPHIL NFR BLD AUTO: 5 % (ref 0–6)
ERYTHROCYTE [DISTWIDTH] IN BLOOD BY AUTOMATED COUNT: 12.3 % (ref 11.6–15.1)
GFR SERPL CREATININE-BSD FRML MDRD: 110 ML/MIN/1.73SQ M
GLUCOSE SERPL-MCNC: 86 MG/DL (ref 65–140)
HCT VFR BLD AUTO: 41.6 % (ref 36.5–49.3)
HGB BLD-MCNC: 13.7 G/DL (ref 12–17)
IMM GRANULOCYTES # BLD AUTO: 0.03 THOUSAND/UL (ref 0–0.2)
IMM GRANULOCYTES NFR BLD AUTO: 0 % (ref 0–2)
INR PPP: 1.02 (ref 0.85–1.19)
LYMPHOCYTES # BLD AUTO: 1.4 THOUSANDS/ΜL (ref 0.6–4.47)
LYMPHOCYTES NFR BLD AUTO: 20 % (ref 14–44)
MCH RBC QN AUTO: 30.6 PG (ref 26.8–34.3)
MCHC RBC AUTO-ENTMCNC: 32.9 G/DL (ref 31.4–37.4)
MCV RBC AUTO: 93 FL (ref 82–98)
MONOCYTES # BLD AUTO: 0.63 THOUSAND/ΜL (ref 0.17–1.22)
MONOCYTES NFR BLD AUTO: 9 % (ref 4–12)
NEUTROPHILS # BLD AUTO: 4.59 THOUSANDS/ΜL (ref 1.85–7.62)
NEUTS SEG NFR BLD AUTO: 66 % (ref 43–75)
NRBC BLD AUTO-RTO: 0 /100 WBCS
P AXIS: 18 DEGREES
PLATELET # BLD AUTO: 287 THOUSANDS/UL (ref 149–390)
PMV BLD AUTO: 9.6 FL (ref 8.9–12.7)
POTASSIUM SERPL-SCNC: 5 MMOL/L (ref 3.5–5.3)
PR INTERVAL: 170 MS
PROT SERPL-MCNC: 6.7 G/DL (ref 6.4–8.4)
PROTHROMBIN TIME: 13.6 SECONDS (ref 12.3–15)
QRS AXIS: 25 DEGREES
QRS AXIS: 29 DEGREES
QRSD INTERVAL: 74 MS
QRSD INTERVAL: 76 MS
QT INTERVAL: 370 MS
QT INTERVAL: 412 MS
QTC INTERVAL: 416 MS
QTC INTERVAL: 451 MS
RBC # BLD AUTO: 4.47 MILLION/UL (ref 3.88–5.62)
SODIUM SERPL-SCNC: 138 MMOL/L (ref 135–147)
T WAVE AXIS: 14 DEGREES
T WAVE AXIS: 20 DEGREES
TSH SERPL DL<=0.05 MIU/L-ACNC: 1.44 UIU/ML (ref 0.45–4.5)
VENTRICULAR RATE: 72 BPM
VENTRICULAR RATE: 76 BPM
WBC # BLD AUTO: 7 THOUSAND/UL (ref 4.31–10.16)

## 2024-11-24 PROCEDURE — 96361 HYDRATE IV INFUSION ADD-ON: CPT

## 2024-11-24 PROCEDURE — 85730 THROMBOPLASTIN TIME PARTIAL: CPT | Performed by: EMERGENCY MEDICINE

## 2024-11-24 PROCEDURE — 36415 COLL VENOUS BLD VENIPUNCTURE: CPT | Performed by: EMERGENCY MEDICINE

## 2024-11-24 PROCEDURE — 99244 OFF/OP CNSLTJ NEW/EST MOD 40: CPT | Performed by: INTERNAL MEDICINE

## 2024-11-24 PROCEDURE — 93005 ELECTROCARDIOGRAM TRACING: CPT

## 2024-11-24 PROCEDURE — 84443 ASSAY THYROID STIM HORMONE: CPT | Performed by: EMERGENCY MEDICINE

## 2024-11-24 PROCEDURE — 93010 ELECTROCARDIOGRAM REPORT: CPT | Performed by: INTERNAL MEDICINE

## 2024-11-24 PROCEDURE — 85025 COMPLETE CBC W/AUTO DIFF WBC: CPT | Performed by: EMERGENCY MEDICINE

## 2024-11-24 PROCEDURE — 99285 EMERGENCY DEPT VISIT HI MDM: CPT

## 2024-11-24 PROCEDURE — 99284 EMERGENCY DEPT VISIT MOD MDM: CPT | Performed by: EMERGENCY MEDICINE

## 2024-11-24 PROCEDURE — 71046 X-RAY EXAM CHEST 2 VIEWS: CPT

## 2024-11-24 PROCEDURE — 85610 PROTHROMBIN TIME: CPT | Performed by: EMERGENCY MEDICINE

## 2024-11-24 PROCEDURE — 80048 BASIC METABOLIC PNL TOTAL CA: CPT | Performed by: EMERGENCY MEDICINE

## 2024-11-24 PROCEDURE — 80076 HEPATIC FUNCTION PANEL: CPT | Performed by: EMERGENCY MEDICINE

## 2024-11-24 PROCEDURE — 96360 HYDRATION IV INFUSION INIT: CPT

## 2024-11-24 PROCEDURE — 84484 ASSAY OF TROPONIN QUANT: CPT | Performed by: EMERGENCY MEDICINE

## 2024-11-24 RX ORDER — DILTIAZEM HYDROCHLORIDE 30 MG/1
30 TABLET, FILM COATED ORAL AS NEEDED
Qty: 120 TABLET | Refills: 0 | Status: SHIPPED | OUTPATIENT
Start: 2024-11-24

## 2024-11-24 RX ORDER — METOPROLOL TARTRATE 25 MG/1
12.5 TABLET, FILM COATED ORAL 2 TIMES DAILY
Qty: 30 TABLET | Refills: 0 | Status: SHIPPED | OUTPATIENT
Start: 2024-11-24 | End: 2024-12-24

## 2024-11-24 RX ADMIN — SODIUM CHLORIDE 1000 ML: 0.9 INJECTION, SOLUTION INTRAVENOUS at 10:37

## 2024-11-24 RX ADMIN — Medication 12.5 MG: at 12:49

## 2024-11-24 NOTE — DISCHARGE INSTRUCTIONS
Take the metoprolol twice daily.    Take the Diltiazem if you have tachycardia, if it does not improve, return to the ED for further evaluation and management.    Follow up with cardiology for further management.

## 2024-11-24 NOTE — CONSULTS
Consultation - Cardiology   Name: Gil Mcdonald 54 y.o. male I MRN: [id number]  Unit/Bed#: ED-30 I Date of Admission: 11/24/2024   Date of Service: 11/24/2024 I Hospital Day: 0   Consult to cardiology  Consult performed by: Aakash Aguero MD  Consult ordered by: Vj Goldman MD      Physician Requesting Evaluation: Vj Goldman MD   Reason for Evaluation / Principal Problem: Tachycardia, palpitations    Assessment & Plan  Paroxysmal supraventricular tachycardia (HCC)            Patient has presented with symptomatic palpitation episode which has been recorded on his EKG by EMS.  It demonstrates narrow complex regular tachycardia with one-to-one conduction.  It is a short RP tachycardia and morphology suggests AV gabriel reentry tachycardia although cannot exclude AV reentry tachycardia.  He gives no history suggestive of angina.  His blood pressure is stable.  His thyroid function is normal.  His caffeine intake is not significant.  His events seem to occur very sporadically and not frequently.  Had detailed discussion with patient and his wife regarding possible diagnoses.      Recommend the following:  -- Will prescribe metoprolol tartrate at a dose of 12.5 mg twice daily to be taken daily.  -- Recommend also getting with him diltiazem short acting 30 mg to be taken if he experiences another bout of tachycardia.  -- There is no indication for anticoagulation for this condition.  -- Advised to keep a diary of symptoms.  -- Will arrange for outpatient extended Holter monitor and an echocardiogram.  -- If he has another episode of tachycardia then would definitely recommend electrophysiologist consultation and consideration for EP study and ablation.    I have discussed the above management plan in detail with the primary service.     History of Present Illness   Gil Mcdonald is a 54 y.o. male who presents with palpitations.  His prior medical history significant for:  1.  History of palpitations --  premature atrial contractions  2.  Dyslipidemia  3.  Anxiety  4.  Gout  5.  Osteoarthritis of the right knee, status post right knee arthroplasty 11/14/2024.    He presented to emergency room  by EMS after having episode of sudden onset palpitations and lightheadedness at home.  He mentions he was in his usual state of health.  He recently underwent right total knee replacement on 11/14/2024 and has been exercising and going for physical therapy.  This morning around 630-7 AM he was walking on his treadmill and he had done about 2 miles of walking when he suddenly felt palpitations, lightheadedness and shortness of breath.  He was able to get off the treadmill and sat down.  He was attended to by his wife who is a medicine advanced practitioner.  He was noted to be tachycardic and appeared pale.  Symptoms lasted approximately an hour when EMS was called.  By the EMS he was noted to be tachycardic.  ECG showed narrow complex regular tachycardia.  As he was brought into the ambulance his cardiac rhythm changed to normal sinus rhythm.  Full EMS report is pending at this time.  He was brought to the emergency room where he was noted to have heart rate in 70s blood pressure was 120/89.  Prior to today's events he was in his usual state of health and undergoing normal recovery from his recent surgery.  He is a very active person and has athletic lifestyle.  He did half marathon recently prior to undergoing the knee surgery.    He does have history of similar palpitation episodes.  Previous event was in March 2024 while he was having COVID-19 illness.  Prior to that previous event was in summer 2023.  He mentions that he has been having on and off similar symptoms for approximately 8 years.He had extensive working he was previously seen by Dr. Francisco Baeza in 2017 and underwent extensive workup including echocardiogram, Holter test and exercise nuclear stress test.  The results are summarized below.  His Holter was  significant for predominant sinus rhythm with average heart rate 79 bpm.  He had trivial ectopy in the form of 10 supraventricular ectopic beats.    He has never been a smoker.  He reports drinking alcohol regularly but not excessively.  Reports having last alcoholic drink last night which was Rum and coke.  Reports having about 1 to 2 cups of coffee a day.    He works for [employer].    His family history is significant for his father having pulmonary hypertension.  There is no family history of premature CAD or sudden cardiac death.    His home cardiac medications include aspirin 81 mg daily rosuvastatin 10 mg daily      Exercise nuclear stress test 5/9/2017:  -  Stress results: Duration of exercise was 10 min. Target heart rate was achieved. There was normal resting blood pressure with a hypertensive response to stress. There was no chest pain during stress.  -  ECG conclusions: The stress ECG was negative for ischemia.  -  Perfusion imaging: There were no perfusion defects.  -  Gated SPECT: The calculated left ventricular ejection fraction was 51 %. Left ventricular ejection fraction was within normal limits by visual estimate. There was no left ventricular regional abnormality.  IMPRESSIONS: Normal study after maximal exercise without reproduction of symptoms. Myocardial perfusion imaging was normal at rest and with stress. Left ventricular systolic function was normal.    24-hour Holter monitor May 2017: Predominant sinus rhythm with average heart rate 76.  Heart rate 54 maximal heart rate 126.  No significant dysrhythmia.  There were 10 premature atrial contractions and 1 atrial run with 3 beats.      Echocardiogram 5/9/2017: Normal LV size and function, EF 70%, normal diastolic function, normal RV size and function, normal left and right atrial size, no significant valvular stenosis or regurgitation, no obvious pulmonary hypertension no pericardial effusion.    Review of Systems   Constitutional:   Positive for activity change and fatigue.   HENT: Negative.     Respiratory:  Positive for shortness of breath.    Cardiovascular:  Positive for palpitations.   Gastrointestinal: Negative.    Endocrine: Negative.    Genitourinary: Negative.    Musculoskeletal:  Positive for joint swelling.   Skin: Negative.    Allergic/Immunologic: Negative.    Neurological:  Positive for dizziness and light-headedness.   Hematological: Negative.    Psychiatric/Behavioral: Negative.       I have reviewed the patient's PMH, PSH, Social History, Family History, Meds, and Allergies    Objective :  Temp:  [97.9 °F (36.6 °C)] 97.9 °F (36.6 °C)  HR:  [73] 73  BP: (120)/(89) 120/89  Resp:  [16] 16  SpO2:  [98 %] 98 %  O2 Device: None (Room air)  Orthostatic Blood Pressures      Flowsheet Row Most Recent Value   Blood Pressure 120/89 filed at 11/24/2024 1019   Patient Position - Orthostatic VS Sitting filed at 11/24/2024 1019          First Weight: Weight - Scale: 95.1 kg (209 lb 10.5 oz) (11/24/24 1019)  Vitals:    11/24/24 1019   Weight: 95.1 kg (209 lb 10.5 oz)       Physical Exam  Constitutional:       Appearance: Normal appearance. He is obese.   HENT:      Head: Normocephalic and atraumatic.      Nose: Nose normal.   Cardiovascular:      Rate and Rhythm: Normal rate and regular rhythm.      Pulses: Normal pulses.      Heart sounds: Normal heart sounds. No murmur heard.  Pulmonary:      Effort: Pulmonary effort is normal.      Breath sounds: Normal breath sounds.   Abdominal:      Palpations: Abdomen is soft.   Musculoskeletal:         General: Swelling present.      Cervical back: Neck supple.      Comments: Postsurgical changes in the right knee region.  This surgical dressing in right knee relating to recent knee replacement surgery.   Skin:     General: Skin is warm.   Neurological:      Mental Status: He is alert and oriented to person, place, and time. Mental status is at baseline.           Lab Results: I have reviewed the  following results:CBC/BMP:   .     11/24/24  1035   WBC 7.00   HGB 13.7   HCT 41.6      SODIUM 138   K 5.0      CO2 28   BUN 28*   CREATININE 0.65   GLUC 86      Results from last 7 days   Lab Units 11/24/24  1035   WBC Thousand/uL 7.00   HEMOGLOBIN g/dL 13.7   HEMATOCRIT % 41.6   PLATELETS Thousands/uL 287     Results from last 7 days   Lab Units 11/24/24  1035   POTASSIUM mmol/L 5.0   CHLORIDE mmol/L 104   CO2 mmol/L 28   BUN mg/dL 28*   CREATININE mg/dL 0.65   CALCIUM mg/dL 9.4     Results from last 7 days   Lab Units 11/24/24  1035   INR  1.02   PTT seconds 25     Lab Results   Component Value Date    HGBA1C 5.5 10/21/2024     Lab Results   Component Value Date    TROPONINI <0.02 04/14/2021       Imaging Results Review: I personally reviewed the following image studies/reports in PACS and discussed pertinent findings with Radiology: chest xray. My interpretation of the radiology images/reports is: No acute abnormality.  No evidence of cardiomegaly or acute cardiopulmonary disease.  Other Study Results Review: EKG was reviewed.     VTE Prophylaxis: Reason for no pharmacologic prophylaxis not indicated.
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
## 2024-11-24 NOTE — ASSESSMENT & PLAN NOTE
Patient has presented with symptomatic palpitation episode which has been recorded on his EKG by EMS.  It demonstrates narrow complex regular tachycardia with one-to-one conduction.  It is a short RP tachycardia and morphology suggests AV gabriel reentry tachycardia although cannot exclude AV reentry tachycardia.  He gives no history suggestive of angina.  His blood pressure is stable.  His thyroid function is normal.  His caffeine intake is not significant.  His events seem to occur very sporadically and not frequently.  Had detailed discussion with patient and his wife regarding possible diagnoses.      Recommend the following:  -- Will prescribe metoprolol tartrate at a dose of 12.5 mg twice daily to be taken daily.  -- Recommend also getting with him diltiazem short acting 30 mg to be taken if he experiences another bout of tachycardia.  -- There is no indication for anticoagulation for this condition.  -- Advised to keep a diary of symptoms.  -- Will arrange for outpatient extended Holter monitor and an echocardiogram.  -- If he has another episode of tachycardia then would definitely recommend electrophysiologist consultation and consideration for EP study and ablation.

## 2024-11-25 ENCOUNTER — TELEPHONE (OUTPATIENT)
Dept: CARDIOLOGY CLINIC | Facility: CLINIC | Age: 54
End: 2024-11-25

## 2024-11-25 NOTE — TELEPHONE ENCOUNTER
Placed call to patient. No answer. Left detailed message with addyo instructions. Ok per communication consent.     Zio registered.

## 2024-11-25 NOTE — TELEPHONE ENCOUNTER
----- Message from Aakash Aguero MD sent at 11/24/2024  1:05 PM EST -----  Patient seen in emergency room for supraventricular tachycardia.    Would like him to have a 2-week extended Holter monitor which I will order, followed by office visit with me.  Can be done as a pullover (patient's wife is provider in [facility] and and his advanced practitioner working with Dr. Brambila).  Thanks.    Aakash Aguero MD

## 2024-11-26 ENCOUNTER — TELEPHONE (OUTPATIENT)
Dept: CARDIOLOGY CLINIC | Facility: CLINIC | Age: 54
End: 2024-11-26

## 2024-11-26 ENCOUNTER — OFFICE VISIT (OUTPATIENT)
Dept: PHYSICAL THERAPY | Facility: CLINIC | Age: 54
End: 2024-11-26
Payer: COMMERCIAL

## 2024-11-26 DIAGNOSIS — M17.11 PRIMARY OSTEOARTHRITIS OF RIGHT KNEE: Primary | ICD-10-CM

## 2024-11-26 PROCEDURE — 97110 THERAPEUTIC EXERCISES: CPT | Performed by: PHYSICAL THERAPIST

## 2024-11-26 PROCEDURE — 97140 MANUAL THERAPY 1/> REGIONS: CPT | Performed by: PHYSICAL THERAPIST

## 2024-11-26 PROCEDURE — 97530 THERAPEUTIC ACTIVITIES: CPT | Performed by: PHYSICAL THERAPIST

## 2024-11-26 NOTE — PROGRESS NOTES
"Daily Note     Today's date: 2024  Patient name: Gil Mcdonald  : 1970  MRN: [id number]  Referring provider: Jennifer Jeffries PA-C  Dx:   Encounter Diagnosis     ICD-10-CM    1. Primary osteoarthritis of right knee  M17.11                        Subjective: Patient states that joint stiffness remains but overall is doing well with minimal increased soreness from exercise progressions last visit.       Objective: See treatment diary below      R knee PROM: 0-127 degrees    Assessment: cues required to improve heel strike with ambulation.  With 6\" steps, patient demonstrates fair concentric strength ascending the steps but limits eccentric control via contralateral toe contact.  Knee PROM/AROM progressing very well and correlating with functional gain.       Plan: Continue per plan of care.      Precautions:   Patient Active Problem List   Diagnosis    Hyperdynamic circulation    Hyperlipidemia    Anxiety    PAC (premature atrial contraction)    Hyperglycemia    Gout    Primary osteoarthritis of right knee    Essential hypertension       s/p R Unicompartmental Arthroplasty (medial compartment) on 2024    Manuals          R knee PROM   AD 8'         R patellar mobs   AD G4         R scar mobs             Tubigrip    AD          Neuro Re-Ed             Tandem walking             Biodex maze                                                                              Ther Ex             Recumbent bike rocking for ROM   5' L0   full L1 5'         Pre-op HEP review performed            Quad sets  2x10 x5\" 5\" 3x10  2x10 x5\" with over pressure         Heel slides  X10 active HEP 10 x5\"         SLR flexion    2x10  2x10         LAQ  3x10 1#  3x10 3# 3x10         Leg press - single leg   95#  3x10 35# 3x10         Mini squats  2x10 3x10  Biofeedback 3x10         HR/TR  3x10 ea. HEP          Standing hip abduction    OTB 2x10 ea.  2x10 B/L no hands or band                      Ther " "Activity             Stair navigation   6\" 2x10  6\" 2x10         Sit to stand training   2x10 from chair           Gait training   5' w/o AD 5' no AD                      Modalities                                            "

## 2024-11-26 NOTE — TELEPHONE ENCOUNTER
Called pt advised him to call me when he sends back his ZIO so I can set up a pullover for Dr easley he agreed

## 2024-11-29 ENCOUNTER — OFFICE VISIT (OUTPATIENT)
Dept: PHYSICAL THERAPY | Facility: CLINIC | Age: 54
End: 2024-11-29
Payer: COMMERCIAL

## 2024-11-29 DIAGNOSIS — M17.11 PRIMARY OSTEOARTHRITIS OF RIGHT KNEE: Primary | ICD-10-CM

## 2024-11-29 PROCEDURE — 97110 THERAPEUTIC EXERCISES: CPT | Performed by: PHYSICAL THERAPIST

## 2024-11-29 PROCEDURE — 97530 THERAPEUTIC ACTIVITIES: CPT | Performed by: PHYSICAL THERAPIST

## 2024-11-29 PROCEDURE — 97140 MANUAL THERAPY 1/> REGIONS: CPT | Performed by: PHYSICAL THERAPIST

## 2024-11-29 NOTE — PROGRESS NOTES
"Daily Note     Today's date: 2024  Patient name: Gil Mcdonald  : 1970  MRN: [id number]  Referring provider: Jennifer Jeffries PA-C  Dx:   Encounter Diagnosis     ICD-10-CM    1. Primary osteoarthritis of right knee  M17.11                          Subjective: Patient reports some soreness from prolonged standing yesterday otherwise is doing well.        Objective: See treatment diary below      R knee PROM: 0-132 degrees    Assessment: Patient continues to make significant functional mobility gains.  Improving stair navigation with only mild eccentric loss of control while descending but resolved contralateral vault ascending.  Extension deficit remains with ambulation but able to achieve full extension passively.  D/C'd knee flexion PROM since this ROM is WNL.      Plan: Continue per plan of care.      Precautions:   Patient Active Problem List   Diagnosis    Hyperdynamic circulation    Hyperlipidemia    Anxiety    PAC (premature atrial contraction)    Hyperglycemia    Gout    Primary osteoarthritis of right knee    Essential hypertension       s/p R Unicompartmental Arthroplasty (medial compartment) on 2024    Manuals         R knee PROM   AD 8' 8'        R patellar mobs   AD G4 G4        R scar mobs             Tubigrip    AD          Neuro Re-Ed             Tandem walking             Biodex maze                                                                              Ther Ex             Recumbent bike rocking for ROM   5' L0   full L1 5' L1 5'        Quad sets  2x10 x5\" 5\" 3x10  2x10 x5\" with over pressure 2x10 x5\"        Heel slides  X10 active HEP 10 x5\" DC        SLR flexion    2x10  2x10 2x10        LAQ  3x10 1#  3x10 3# 3x10 4# 3x10        Leg press - single leg   95#  3x10 35# 3x10 45# 3x10        Mini squats  2x10 3x10  Biofeedback 3x10 2x10 biofeedback        Standing hip abduction    OTB 2x10 ea.  2x10 B/L no hands or band 2x10 B/L no hands      " "  TKE at [place]     15# 2x10 x5\"                                  Ther Activity             Stair navigation   6\" 2x10  6\" 2x10 FF x2        Sit to stand training   2x10 from chair   2x10 chair 5#        Gait training   5' w/o AD 5' no AD 5' no AD                     Modalities                                            "

## 2024-12-02 ENCOUNTER — OFFICE VISIT (OUTPATIENT)
Dept: PHYSICAL THERAPY | Facility: CLINIC | Age: 54
End: 2024-12-02
Payer: COMMERCIAL

## 2024-12-02 DIAGNOSIS — M17.11 PRIMARY OSTEOARTHRITIS OF RIGHT KNEE: Primary | ICD-10-CM

## 2024-12-02 PROCEDURE — 97140 MANUAL THERAPY 1/> REGIONS: CPT | Performed by: PHYSICAL THERAPIST

## 2024-12-02 PROCEDURE — 97530 THERAPEUTIC ACTIVITIES: CPT | Performed by: PHYSICAL THERAPIST

## 2024-12-02 PROCEDURE — 97110 THERAPEUTIC EXERCISES: CPT | Performed by: PHYSICAL THERAPIST

## 2024-12-02 NOTE — PROGRESS NOTES
"Daily Note     Today's date: 2024  Patient name: Gil Mcdonald  : 1970  MRN: [id number]  Referring provider: Jennifer Jeffries PA-C  Dx:   Encounter Diagnosis     ICD-10-CM    1. Primary osteoarthritis of right knee  M17.11                          Subjective: Patient reports continued general soreness but overall is tolerating his exercises well.  Remains compliant with his HEP.       Objective: See treatment diary below      R knee PROM: 0-132 degrees    Assessment: Patients gait making steady progress regarding stair navigation as quadriceps control is improving with descending steps.  Ambulatory speed improving, but some right knee rigidity remains limiting extension.  Increased weight bearing of the right LE with squats. Patient continues to make make steady progress toward strength and function goals.       Plan: Continue per plan of care.      Precautions:   Patient Active Problem List   Diagnosis    Hyperdynamic circulation    Hyperlipidemia    Anxiety    PAC (premature atrial contraction)    Hyperglycemia    Gout    Primary osteoarthritis of right knee    Essential hypertension       s/p R Unicompartmental Arthroplasty (medial compartment) on 2024    Manuals        R knee PROM   AD 8' 8' 6'       R patellar mobs   AD G4 G4 G4       R scar mobs             Tubigrip    AD          Neuro Re-Ed             Tandem walking             Biodex maze                                                                              Ther Ex             Recumbent bike rocking for ROM   5' L0   full L1 5' L1 5' L1 5'       Quad sets  2x10 x5\" 5\" 3x10  2x10 x5\" with over pressure 2x10 x5\" 2x10 x5\"       Heel slides  X10 active HEP 10 x5\" DC        SLR flexion    2x10  2x10 2x10        LAQ  3x10 1#  3x10 3# 3x10 4# 3x10 5# 3x10       Leg press - single leg   95#  3x10 35# 3x10 45# 3x10 45# 3x15       Mini squats  2x10 3x10  Biofeedback 3x10 2x10 biofeedback        Standing " "hip abduction    OTB 2x10 ea.  2x10 B/L no hands or band 2x10 B/L no hands 2x10 B/L no hands       TKE at zora     15# 2x10 x5\" 15# 2x10 x5\"                                 Ther Activity             Stair navigation   6\" 2x10  6\" 2x10 FF x2 8\" 2x10       Sit to stand training   2x10 from chair   2x10 chair 5# 2x10 10#       Gait training   5' w/o AD 5' no AD 5' no AD 5' no AD                    Modalities                                            "

## 2024-12-03 ENCOUNTER — APPOINTMENT (OUTPATIENT)
Age: 54
End: 2024-12-03
Payer: COMMERCIAL

## 2024-12-03 ENCOUNTER — OFFICE VISIT (OUTPATIENT)
Age: 54
End: 2024-12-03

## 2024-12-03 VITALS
SYSTOLIC BLOOD PRESSURE: 128 MMHG | BODY MASS INDEX: 29.92 KG/M2 | WEIGHT: 209 LBS | HEIGHT: 70 IN | DIASTOLIC BLOOD PRESSURE: 78 MMHG

## 2024-12-03 DIAGNOSIS — Z96.651 STATUS POST UNILATERAL KNEE REPLACEMENT, RIGHT: ICD-10-CM

## 2024-12-03 DIAGNOSIS — Z96.651 STATUS POST UNILATERAL KNEE REPLACEMENT, RIGHT: Primary | ICD-10-CM

## 2024-12-03 PROCEDURE — 73562 X-RAY EXAM OF KNEE 3: CPT

## 2024-12-03 PROCEDURE — 99024 POSTOP FOLLOW-UP VISIT: CPT | Performed by: STUDENT IN AN ORGANIZED HEALTH CARE EDUCATION/TRAINING PROGRAM

## 2024-12-03 NOTE — LETTER
December 3, 2024     Patient: Gil Mcdonald  YOB: 1970  Date of Visit: 12/3/2024      To Whom it May Concern:    Gil Mcdonald is under my professional care. Gil was seen in my office on 12/3/2024. Gil may return to work on 12/23/24 without restrictions .    If you have any questions or concerns, please don't hesitate to call.         Sincerely,          Gil Flores, DO        CC: No Recipients

## 2024-12-03 NOTE — PROGRESS NOTES
Subjective: Patient seen and examined. Pain well-controlled. Progressing well, he is ambulating unassisted. Incision without drainage, mepilex dressing intact. Denies fevers or chills.  He is happy with his progress so far.  He is attending formal PT and would like to transition to a HEP.  He is hoping to return to work at the end of the month. He did have an episode of tachycardia 10 days post-op while on the treadmill and seen in the ED. He has a history of these episodes once or so a year. He was previously worked up with a holter monitor and will be again with cardiology.     Physical Exam:  Incision: CDI, no erythema or drainage noted  ROM: 0-125  5/5 IP/Q/HS/TA/GS, 2+ DP/PT, SILT DP/SP/S/S/TN    XR Right knee: s/p cemented medial oxford UKA, components in good position. No fracture or dislocation.      Assessment/Plan:  53 y/o male doing excellent 2.5 weeks s/p Right medial UKA from 11/14/24.    - continue multi-modal pain control   - Weight bearing status: WBAT RLE  - DVT ppx: aspirin 81mg twice daily x 4 weeks  - Incision care: May shower, do not scrub or submerge incision.  - PT/OT, may transition to HEP.  - F/U 4 weeks      Scribe Attestation      I,:  Jennifer Jeffries PA-C am acting as a scribe while in the presence of the attending physician.:       I,:  Gil Flores, DO personally performed the services described in this documentation    as scribed in my presence.:

## 2024-12-05 ENCOUNTER — OFFICE VISIT (OUTPATIENT)
Dept: PHYSICAL THERAPY | Facility: CLINIC | Age: 54
End: 2024-12-05
Payer: COMMERCIAL

## 2024-12-05 DIAGNOSIS — M17.11 PRIMARY OSTEOARTHRITIS OF RIGHT KNEE: Primary | ICD-10-CM

## 2024-12-05 DIAGNOSIS — M17.11 PRIMARY OSTEOARTHRITIS OF RIGHT KNEE: ICD-10-CM

## 2024-12-05 PROCEDURE — 97112 NEUROMUSCULAR REEDUCATION: CPT | Performed by: PHYSICAL THERAPIST

## 2024-12-05 PROCEDURE — 97140 MANUAL THERAPY 1/> REGIONS: CPT | Performed by: PHYSICAL THERAPIST

## 2024-12-05 PROCEDURE — 97530 THERAPEUTIC ACTIVITIES: CPT | Performed by: PHYSICAL THERAPIST

## 2024-12-05 RX ORDER — ASPIRIN 81 MG/1
81 TABLET, COATED ORAL 2 TIMES DAILY
Qty: 60 TABLET | Refills: 5 | Status: SHIPPED | OUTPATIENT
Start: 2024-12-05

## 2024-12-05 RX ORDER — CELECOXIB 200 MG/1
200 CAPSULE ORAL 2 TIMES DAILY
Qty: 60 CAPSULE | Refills: 5 | Status: SHIPPED | OUTPATIENT
Start: 2024-12-05

## 2024-12-05 RX ORDER — CHOLECALCIFEROL (VITAMIN D3) 25 MCG
2000 TABLET ORAL DAILY
Qty: 60 TABLET | Refills: 5 | Status: SHIPPED | OUTPATIENT
Start: 2024-12-05

## 2024-12-05 NOTE — PROGRESS NOTES
"Daily Note     Today's date: 2024  Patient name: Gil Mcdonald  : 1970  MRN: [id number]  Referring provider: Jennifer Jeffries PA-C  Dx:   Encounter Diagnosis     ICD-10-CM    1. Primary osteoarthritis of right knee  M17.11                          Subjective: Patient states that he continues to make functional progress.  Notes no soreness or DOMS following last treatment session.      Objective: See treatment diary below      R knee PROM: WFL; mild AROM knee extension deficit    Assessment: Patient demonstrates improved knee extension during initial heel strike during gait.  Excellent stability noted with neuromuscular progressions, able to maintain equal weight bearing with M/L rocker board.  Overall patient continues to make progress toward strength, ROM and function goals.      Plan: Continue per plan of care.      Precautions:   Patient Active Problem List   Diagnosis    Hyperdynamic circulation    Hyperlipidemia    Anxiety    PAC (premature atrial contraction)    Hyperglycemia    Gout    Primary osteoarthritis of right knee    Essential hypertension       s/p R Unicompartmental Arthroplasty (medial compartment) on 2024    Manuals       R knee PROM   AD 8' 8' 6' 6'      R patellar mobs   AD G4 G4 G4 G4      R scar mobs       2'      Tubigrip    AD          Neuro Re-Ed             Tandem walking       10 steps x4      Biodex maze             Rocker board balance       3x30\" ea.                                                          Ther Ex             Recumbent bike rocking for ROM   5' L0   full L1 5' L1 5' L1 5' L4 5'      Quad sets  2x10 x5\" 5\" 3x10  2x10 x5\" with over pressure 2x10 x5\" 2x10 x5\" 2x10 x5\"      Heel slides  X10 active HEP 10 x5\" DC        SLR flexion    2x10  2x10 2x10        LAQ  3x10 1#  3x10 3# 3x10 4# 3x10 5# 3x10 7.5# 3x10      Leg press - single leg   95#  3x10 35# 3x10 45# 3x10 45# 3x15 55# 3x10      Mini squats  2x10 3x10  " "Biofeedback 3x10 2x10 biofeedback        Standing hip abduction    OTB 2x10 ea.  2x10 B/L no hands or band 2x10 B/L no hands 2x10 B/L no hands 2x10 B/L no hands      TKE at zora     15# 2x10 x5\" 15# 2x10 x5\" 18# 2x10 x5\"                                Ther Activity             Stair navigation   6\" 2x10  6\" 2x10 FF x2 8\" 2x10 8\" 2x10      Sit to stand training   2x10 from chair   2x10 chair 5# 2x10 10# 2x10 10#      Gait training   5' w/o AD 5' no AD 5' no AD 5' no AD 3' no AD                   Modalities                                            "

## 2024-12-09 ENCOUNTER — OFFICE VISIT (OUTPATIENT)
Dept: PHYSICAL THERAPY | Facility: CLINIC | Age: 54
End: 2024-12-09
Payer: COMMERCIAL

## 2024-12-09 DIAGNOSIS — M17.11 PRIMARY OSTEOARTHRITIS OF RIGHT KNEE: Primary | ICD-10-CM

## 2024-12-09 PROCEDURE — 97140 MANUAL THERAPY 1/> REGIONS: CPT | Performed by: PHYSICAL THERAPIST

## 2024-12-09 PROCEDURE — 97112 NEUROMUSCULAR REEDUCATION: CPT | Performed by: PHYSICAL THERAPIST

## 2024-12-09 PROCEDURE — 97530 THERAPEUTIC ACTIVITIES: CPT | Performed by: PHYSICAL THERAPIST

## 2024-12-09 NOTE — PROGRESS NOTES
"Daily Note     Today's date: 2024  Patient name: Gil Mcdonald  : 1970  MRN: [id number]  Referring provider: Jennifer Jeffries PA-C  Dx:   Encounter Diagnosis     ICD-10-CM    1. Primary osteoarthritis of right knee  M17.11                          Subjective: Patient reports increased soreness pre-tx.  States that he has been sore since Friday since he was \"on it a lot\".        Objective: See treatment diary below      R knee PROM: WFL; mild AROM knee extension deficit    Assessment: Patients stability and control with movement improving per stair navigation, squats and biodex feedback.  Knee extension AROM improving per increased quad contributions noted with TKE and manuals.  Overall patient is making steady progress toward goals.       Plan: Continue per plan of care.      Precautions:   Patient Active Problem List   Diagnosis    Hyperdynamic circulation    Hyperlipidemia    Anxiety    PAC (premature atrial contraction)    Hyperglycemia    Gout    Primary osteoarthritis of right knee    Essential hypertension       s/p R Unicompartmental Arthroplasty (medial compartment) on 2024    Manuals      R knee PROM   AD 8' 8' 6' 6' 6'     R patellar mobs   AD G4 G4 G4 G4 G4     R scar mobs       2' 2'     Tubigrip    AD          Neuro Re-Ed             Tandem walking       10 steps x4 10 steps x4     Biodex maze        L2 x2     Rocker board balance (A/P, M/L)       3x30\" ea. 3x30\" ea.                                                         Ther Ex             Recumbent bike rocking for ROM   5' L0   full L1 5' L1 5' L1 5' L4 5' L4 5'     Quad sets  2x10 x5\" 5\" 3x10  2x10 x5\" with over pressure 2x10 x5\" 2x10 x5\" 2x10 x5\" 2x10 x5\"     Heel slides  X10 active HEP 10 x5\" DC        SLR flexion    2x10  2x10 2x10        LAQ  3x10 1#  3x10 3# 3x10 4# 3x10 5# 3x10 7.5# 3x10 10#  3x10     Leg press - single leg   95#  3x10 35# 3x10 45# 3x10 45# 3x15 55# 3x10 55# " "3x10     Mini squats  2x10 3x10  Biofeedback 3x10 2x10 biofeedback        Standing hip abduction    OTB 2x10 ea.  2x10 B/L no hands or band 2x10 B/L no hands 2x10 B/L no hands 2x10 B/L no hands      TKE at zora     15# 2x10 x5\" 15# 2x10 x5\" 18# 2x10 x5\" 18# 2x10 x5\"                               Ther Activity             Stair navigation   6\" 2x10  6\" 2x10 FF x2 8\" 2x10 8\" 2x10 8\" 2x10     Sit to stand training   2x10 from chair   2x10 chair 5# 2x10 10# 2x10 10# 2x10 10#     Gait training   5' w/o AD 5' no AD 5' no AD 5' no AD 3' no AD 3' no AD                  Modalities                                            "

## 2024-12-12 ENCOUNTER — APPOINTMENT (OUTPATIENT)
Dept: PHYSICAL THERAPY | Facility: CLINIC | Age: 54
End: 2024-12-12
Payer: COMMERCIAL

## 2024-12-16 ENCOUNTER — EVALUATION (OUTPATIENT)
Dept: PHYSICAL THERAPY | Facility: CLINIC | Age: 54
End: 2024-12-16
Payer: COMMERCIAL

## 2024-12-16 DIAGNOSIS — M17.11 PRIMARY OSTEOARTHRITIS OF RIGHT KNEE: Primary | ICD-10-CM

## 2024-12-16 PROCEDURE — 97140 MANUAL THERAPY 1/> REGIONS: CPT | Performed by: PHYSICAL THERAPIST

## 2024-12-16 PROCEDURE — 97112 NEUROMUSCULAR REEDUCATION: CPT | Performed by: PHYSICAL THERAPIST

## 2024-12-16 PROCEDURE — 97110 THERAPEUTIC EXERCISES: CPT | Performed by: PHYSICAL THERAPIST

## 2024-12-16 NOTE — PROGRESS NOTES
PT Evaluation     Today's date: 2024  Patient name: Gil Mcdonald  : 1970  MRN: [id number]  Referring provider: Jennifer Jeffries PA-C  Dx:   Encounter Diagnosis     ICD-10-CM    1. Primary osteoarthritis of right knee  M17.11                        Assessment  Impairments: abnormal gait, abnormal or restricted ROM, abnormal movement, activity intolerance, impaired balance, impaired physical strength, pain with function and weight-bearing intolerance    Assessment details: Patient is a 54 y.o. year old male who attended physical therapy for 9 treatment sessions s/p R Unicompartmental Arthroplasty (medial compartment) on 2024. Patient reports 85% improvement at this time which correlates with FOTO scoring.  Patient has shown improvement throughout PT by demonstrating decreased pain, increased range of motion, increased strength, and improved tolerance to activity.  Secondary to achieving functional goals and independence with comprehensive Home Exercise Program, Gil will be discharged from PT at this time.  Thank you.         Understanding of Dx/Px/POC: excellent     Prognosis: excellent    Goals  Short Term Goals: to be achieved by 3 weeks  1) Patient will be independent with initial HEP (MET)  2) Decrease pain at worst by 2 points on NPRS (MET)  3) Increase L knee PROM to WNL (MET)  4) Increase LE strength by 1/2 MMT grade in all deficient planes (MET)  5) Patient to negotiate steps with a reciprocal pattern with use of handrail (MET)    Long Term Goals: to be achieved by discharge (6 weeks)  1) Increase FOTO score > or equal to expected outcome (PARTIALLY MET)  2) Patient to be independent with comprehensive HEP (MET)  3) Abolish pain for improved quality of life (PARTIALLY MET)  4) Increase L knee AROM to WNL (MET)  5) Patient to negotiate steps with a reciprocal pattern without use of handrail (MET)  6) Patient to report no sleep interruption secondary to pain (PARTIALLY  MET)    Plan  Patient would benefit from: skilled PT    Planned therapy interventions: joint mobilization, manual therapy, neuromuscular re-education, patient education, strengthening, stretching, therapeutic activities, therapeutic exercise, home exercise program, gait training, functional ROM exercises, balance/weight bearing training and ADL training    Treatment plan discussed with: patient  Plan details: DC        Subjective Evaluation    History of Present Illness  Mechanism of injury: Patient reports to outpatient PT for a pre-op appointment regarding a scheduled unilateral compartment knee replacement scheduled for 2024.  Patient describes the pain as achy which is worse with squatting, walking on even surfaces and stair navigation, walking down hills and improved with rest.  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  Patients goals for PT are to decrease the pain and return to PLOF including pickle ball.      2024: Patient returns to outpatient PT s/p R Unicompartmental Arthroplasty (medial compartment) on 2024.  Patient was d/c'd home without complications.  RTW scheduled for .  Patient describes his post-op pain as achy and tight which is worse with weight bearing activities, laying down and improved with rest, ice and medications.     2024: Patient reports return to most functional activities and is nearing return to recreation.  Some discomfort remains with sleep and throughout the day but overall is lessening.  Patient is confident in continued progress upon discharge with his comprehensive HEP.           Recurrent probem    Patient Goals  Patient goals for therapy: increased strength, independence with ADLs/IADLs, return to sport/leisure activities, increased motion and decreased pain    Pain  Current pain ratin  At best pain ratin  At worst pain rating: 3  Quality: dull  ache and sharp  Relieving factors: rest and relaxation  Aggravating factors: standing, walking and stair climbing  Progression: worsening    Social Support  Steps to enter house: yes (2 steps)  Stairs in house: yes   Lives in: multiple-level home  Lives with: spouse    Employment status: working  Hand dominance: right      Diagnostic Tests  X-ray: abnormal  MRI studies: abnormal        Objective     Neurological Testing     Sensation     Knee     Right Knee   Intact: light touch     Additional Neurological Details  2+ pitting edema (RESOLVED)    (+) high risk for DVT per Wells Criteria (RESOLVED)    Active Range of Motion     Right Knee   Flexion: 126 degrees   Extension: 0 degrees     Passive Range of Motion     Right Knee   Flexion: 133 degrees   Extension: 0 degrees     Strength/Myotome Testing     Left Knee   Flexion: 5  Extension: 5    Right Knee   Flexion: 5  Extension: 5  Quadriceps contraction: good    Ambulation     Ambulation: Level Surfaces     Additional Level Surfaces Ambulation Details  Gait assessment: (PRE-op) Slow gait with mild reduction in weight bearing without an assistive device. (POST-op) Slow gait speed with reduction in heel strike but adequate step lengths that are equal using a SPC (12/16/2024: Normal gait speed and normal mechanics without an AD)    Functional tests:    Pre-op:  TUG = 10.98 seconds without AD  5XSTS: 14.43 seconds without UE push off     Post-op:  TUG = 12.96 seconds without UE push off and SPC  5XSTS = 15.63 seconds without UE push off    Re-eval on 12/16/2024:  TUG = 7.31 seconds without UE push off or AD  5XSTS: 11.23 seconds without UE push off    Ambulation: Stairs   Ascend stairs: independent  Pattern: reciprocal  Railings: without rails  Descend stairs: independent  Pattern: reciprocal  Railings: without rails             Precautions:   Patient Active Problem List   Diagnosis    Hyperdynamic circulation    Hyperlipidemia    Anxiety    PAC (premature atrial  "contraction)    Hyperglycemia    Gout    Primary osteoarthritis of right knee    Essential hypertension    Paroxysmal supraventricular tachycardia (HCC)       s/p R Unicompartmental Arthroplasty (medial compartment) on 11/14/2024    Manuals 11/6 11/18 11/22 11/26 11/29 12/2 12/5 12/9 12/16    R knee PROM   AD 8' 8' 6' 6' 6'     R patellar mobs   AD G4 G4 G4 G4 G4     R scar mobs       2' 2'     Tubigrip    AD          Re-eval         15'    Neuro Re-Ed             Tandem walking       10 steps x4 10 steps x4     Biodex maze        L2 x2     Rocker board balance (A/P, M/L)       3x30\" ea. 3x30\" ea.     Y balance         2x5    RDL         2x10                              Ther Ex             Recumbent bike rocking for ROM   5' L0   full L1 5' L1 5' L1 5' L4 5' L4 5' L4 5'    Quad sets  2x10 x5\" 5\" 3x10  2x10 x5\" with over pressure 2x10 x5\" 2x10 x5\" 2x10 x5\" 2x10 x5\"     Heel slides  X10 active HEP 10 x5\" DC        SLR flexion    2x10  2x10 2x10        LAQ  3x10 1#  3x10 3# 3x10 4# 3x10 5# 3x10 7.5# 3x10 10#  3x10     Leg press - single leg   95#  3x10 35# 3x10 45# 3x10 45# 3x15 55# 3x10 55# 3x10     Mini squats  2x10 3x10  Biofeedback 3x10 2x10 biofeedback        Standing hip abduction    OTB 2x10 ea.  2x10 B/L no hands or band 2x10 B/L no hands 2x10 B/L no hands 2x10 B/L no hands      TKE at zora     15# 2x10 x5\" 15# 2x10 x5\" 18# 2x10 x5\" 18# 2x10 x5\"     HEP Review         8'                 Ther Activity             Stair navigation   6\" 2x10  6\" 2x10 FF x2 8\" 2x10 8\" 2x10 8\" 2x10     Sit to stand training   2x10 from chair   2x10 chair 5# 2x10 10# 2x10 10# 2x10 10#     Gait training   5' w/o AD 5' no AD 5' no AD 5' no AD 3' no AD 3' no AD                  Modalities                                              " Airway patent, Nasal mucosa clear. Mouth with dry mucosa. Throat has no vesicles, no oropharyngeal exudates and uvula is midline.

## 2024-12-18 ENCOUNTER — TELEPHONE (OUTPATIENT)
Dept: CARDIOLOGY CLINIC | Facility: CLINIC | Age: 54
End: 2024-12-18

## 2024-12-18 ENCOUNTER — CLINICAL SUPPORT (OUTPATIENT)
Dept: CARDIOLOGY CLINIC | Facility: CLINIC | Age: 54
End: 2024-12-18
Payer: COMMERCIAL

## 2024-12-18 DIAGNOSIS — I47.10 PAROXYSMAL SUPRAVENTRICULAR TACHYCARDIA (HCC): ICD-10-CM

## 2024-12-18 NOTE — TELEPHONE ENCOUNTER
PC from [facility] regarding Zio report with 3 runs of SVT with patient symptomatic.  Run of 1 hour and 33 mins with max rate of 174 and average 145 BPM.

## 2024-12-20 ENCOUNTER — APPOINTMENT (OUTPATIENT)
Dept: PHYSICAL THERAPY | Facility: CLINIC | Age: 54
End: 2024-12-20
Payer: COMMERCIAL

## 2024-12-22 DIAGNOSIS — I49.9 ARRHYTHMIA: ICD-10-CM

## 2024-12-22 RX ORDER — METOPROLOL TARTRATE 25 MG/1
12.5 TABLET, FILM COATED ORAL 2 TIMES DAILY
Qty: 30 TABLET | Refills: 0 | Status: SHIPPED | OUTPATIENT
Start: 2024-12-22 | End: 2025-01-21

## 2024-12-24 ENCOUNTER — RESULTS FOLLOW-UP (OUTPATIENT)
Dept: CARDIOLOGY CLINIC | Facility: CLINIC | Age: 54
End: 2024-12-24

## 2024-12-24 ENCOUNTER — DOCUMENTATION (OUTPATIENT)
Dept: CARDIOLOGY CLINIC | Facility: CLINIC | Age: 54
End: 2024-12-24

## 2024-12-24 PROCEDURE — 93248 EXT ECG>7D<15D REV&INTERPJ: CPT | Performed by: INTERNAL MEDICINE

## 2024-12-24 NOTE — TELEPHONE ENCOUNTER
----- Message from Aakash Aguero MD sent at 2024 11:10 AM EST -----  Name: Gil Mcdonald      : 1970      MRN: [id number]    Zio XT extended Holter monitor review report:  -- Patient was monitored for 13 days 11 hours between 2024 and 2024.  Indication for monitoring was for evaluation for supraventricular tachycardia  --. Patient had a min HR of 43 bpm, max HR of 174 bpm, and avg HR of 66 bpm. Predominant underlying rhythm was Sinus Rhythm.  -- 3 Supraventricular Tachycardia runs occurred, the run with the fastest interval lasting 1 hour 33 mins with a max rate of 174 bpm (avg 145 bpm); the run with the fastest interval was also the longest. Supraventricular Tachycardia was detected within +/- 45 seconds of symptomatic patient event(s).  -- There was no occurrence of ventricular tachycardia or clinically significant pause events or Mobitz type II or high degree AV block or atrial fibrillation.  -- Isolated SVEs were rare (<1.0%), SVE Couplets were rare (<1.0%), and SVE Triplets were rare (<1.0%).  -- Isolated VEs were rare (<1.0%), VE Couplets were rare (<1.0%), and no VE Triplets were present. MD notification criteria for Supraventricular Tachycardia met.  -- There were 7 triggered events and 8 diary entries.  Triggered events correlated with sinus rhythm with artifact.  Symptom events included feeling of lightheadedness and fluttering and feeling of irregular heartbeat.  There was also 1 episode of feeling cold sweat.  All symptoms correlated with sinus rhythm with heart rate in 60s to 80s.  No symptoms were noted in conjunction with supraventricular tachycardia events.    Conclusion: Abnormal extended Holter monitor findings with predominant sinus rhythm with episodic supraventricular tachycardia.  Morphology during tachycardia events suggest possible atrial tachycardia or reentry tachycardia.    Recommendations: Advising patient to increase the metoprolol medication he is taking  to 1 full tablet in the morning and half tablet in the evening.  He is advised to continue to take diltiazem 30 mg daily prescribed to him as needed when he is feeling heart racing.    Aakash Aguero MD

## 2024-12-24 NOTE — TELEPHONE ENCOUNTER
Phone call to patient.  Confirmed he received message from Dr. Aguero.  He will increase his medications as directed by Dr. Aguero.  He will keep follow up appointment 1/2/25

## 2024-12-24 NOTE — PROGRESS NOTES
Name: Gil Mcdonald      : 1970      MRN: [id number]    Zio XT extended Holter monitor review report:  -- Patient was monitored for 13 days 11 hours between 2024 and 2024.  Indication for monitoring was for evaluation for supraventricular tachycardia  --. Patient had a min HR of 43 bpm, max HR of 174 bpm, and avg HR of 66 bpm. Predominant underlying rhythm was Sinus Rhythm.  -- 3 Supraventricular Tachycardia runs occurred, the run with the fastest interval lasting 1 hour 33 mins with a max rate of 174 bpm (avg 145 bpm); the run with the fastest interval was also the longest. Supraventricular Tachycardia was detected within +/- 45 seconds of symptomatic patient event(s).  -- There was no occurrence of ventricular tachycardia or clinically significant pause events or Mobitz type II or high degree AV block or atrial fibrillation.  -- Isolated SVEs were rare (<1.0%), SVE Couplets were rare (<1.0%), and SVE Triplets were rare (<1.0%).  -- Isolated VEs were rare (<1.0%), VE Couplets were rare (<1.0%), and no VE Triplets were present. MD notification criteria for Supraventricular Tachycardia met.  -- There were 7 triggered events and 8 diary entries.  Triggered events correlated with sinus rhythm with artifact.  Symptom events included feeling of lightheadedness and fluttering and feeling of irregular heartbeat.  There was also 1 episode of feeling cold sweat.  All symptoms correlated with sinus rhythm with heart rate in 60s to 80s.  No symptoms were noted in conjunction with supraventricular tachycardia events.    Conclusion: Abnormal extended Holter monitor findings with predominant sinus rhythm with episodic supraventricular tachycardia.  Morphology during tachycardia events suggest possible atrial tachycardia or reentry tachycardia.    Recommendations: Advising patient to increase the metoprolol medication he is taking to 1 full tablet in the morning and half tablet in the evening.  He is  advised to continue to take diltiazem 30 mg daily prescribed to him as needed when he is feeling heart racing.    Aakash Aguero MD

## 2025-01-02 ENCOUNTER — OFFICE VISIT (OUTPATIENT)
Dept: CARDIOLOGY CLINIC | Facility: CLINIC | Age: 55
End: 2025-01-02
Payer: COMMERCIAL

## 2025-01-02 VITALS
HEIGHT: 70 IN | SYSTOLIC BLOOD PRESSURE: 126 MMHG | BODY MASS INDEX: 28.77 KG/M2 | HEART RATE: 57 BPM | DIASTOLIC BLOOD PRESSURE: 72 MMHG | WEIGHT: 201 LBS | OXYGEN SATURATION: 100 %

## 2025-01-02 DIAGNOSIS — I47.10 PAROXYSMAL SUPRAVENTRICULAR TACHYCARDIA (HCC): Primary | ICD-10-CM

## 2025-01-02 DIAGNOSIS — R00.1 BRADYCARDIA: ICD-10-CM

## 2025-01-02 DIAGNOSIS — I49.9 ARRHYTHMIA: ICD-10-CM

## 2025-01-02 PROCEDURE — 99214 OFFICE O/P EST MOD 30 MIN: CPT | Performed by: INTERNAL MEDICINE

## 2025-01-02 RX ORDER — METOPROLOL TARTRATE 25 MG/1
TABLET, FILM COATED ORAL
Qty: 135 TABLET | Refills: 3 | Status: SHIPPED | OUTPATIENT
Start: 2025-01-02 | End: 2025-02-01

## 2025-01-02 NOTE — ASSESSMENT & PLAN NOTE
Has asymptomatic bradycardia.  Likely due to being on beta-blocker therapy.  Advise continue to monitor heart rate and blood pressure from time to time.  As long as heart rate is above 40 and as long as his asymptomatic it is okay for her to have a low heart rate.  Goal systolic blood pressure is at or less than 130 mmHg and diastolic blood pressure less than 80 mmHg.

## 2025-01-02 NOTE — ASSESSMENT & PLAN NOTE
Mr. Gil Mcdonald did experience 1 more episode of supraventricular tachycardia event since his emergency room visit on 24th November.  Tachycardia initiation and termination is suggestive of short RP tachycardia.  Although there was no demonstratable AH jump on the ECG I still suspect this was an AV gabriel reentry tachycardia.  Episode lasted hour and a half.  He is now on beta-blocker therapy and is noted to be bradycardic.  He is asymptomatic though he has some fatigue.  Electrolytes at last testing were normal.  Tachycardia ECG is noted in documentation(in medical chart)    -- I am advising him to continue current medications.  -- We will get an echocardiogram to assess cardiac structure and function.  -- I am referring him to our electrophysiology colleagues to consider for EP study and ablation especially if he has any more episodes of tachycardia.  -- I will tentatively place a 3-month follow-up visit with me however if he establish care with electrophysiologist than the appointment can be canceled with me.    Orders:    Echo complete w/ contrast if indicated; Future    Ambulatory referral to Cardiac Electrophysiology; Future

## 2025-01-02 NOTE — PROGRESS NOTES
Name: Gil Mcdonald      : 1970      MRN: [id number]  Encounter Provider: Aakash Aguero MD  Encounter Date: 2025   Encounter department: [facility] CARDIOLOGY [place]    DIAGNOSES:  1.  Paroxysmal supraventricular tachycardia, diagnosed 2024, History of palpitations -- premature atrial contractions  2.  Dyslipidemia  3.  Anxiety  4.  Gout  5.  Osteoarthritis of the right knee, status post right knee arthroplasty 2024.    Zio XT extended Holter monitor review report:  -- Patient was monitored for 13 days 11 hours between 2024 and 2024.  Indication for monitoring was for evaluation for supraventricular tachycardia  --. Patient had a min HR of 43 bpm, max HR of 174 bpm, and avg HR of 66 bpm. Predominant underlying rhythm was Sinus Rhythm.  -- 3 Supraventricular Tachycardia runs occurred, the run with the fastest interval lasting 1 hour 33 mins with a max rate of 174 bpm (avg 145 bpm); the run with the fastest interval was also the longest. Supraventricular Tachycardia was detected within +/- 45 seconds of symptomatic patient event(s).  -- There was no occurrence of ventricular tachycardia or clinically significant pause events or Mobitz type II or high degree AV block or atrial fibrillation.  -- Isolated SVEs were rare (<1.0%), SVE Couplets were rare (<1.0%), and SVE Triplets were rare (<1.0%).  -- Isolated VEs were rare (<1.0%), VE Couplets were rare (<1.0%), and no VE Triplets were present. MD notification criteria for Supraventricular Tachycardia met.  -- There were 7 triggered events and 8 diary entries.  Triggered events correlated with sinus rhythm with artifact.  Symptom events included feeling of lightheadedness and fluttering and feeling of irregular heartbeat.  There was also 1 episode of feeling cold sweat.  All symptoms correlated with sinus rhythm with heart rate in 60s to 80s.  No symptoms were noted in conjunction with supraventricular tachycardia  events.  Conclusion: Abnormal extended Holter monitor findings with predominant sinus rhythm with episodic supraventricular tachycardia.  Morphology during tachycardia events suggest possible atrial tachycardia or reentry tachycardia.  Recommendations: Advising patient to increase the metoprolol medication he is taking to 1 full tablet in the morning and half tablet in the evening.  He is advised to continue to take diltiazem 30 mg daily prescribed to him as needed when he is feeling heart racing.    EXERCISE NUCLEAR STRESS TEST 5/9/2017:  -  Stress results: Duration of exercise was 10 min. Target heart rate was achieved. There was normal resting blood pressure with a hypertensive response to stress. There was no chest pain during stress.  -  ECG conclusions: The stress ECG was negative for ischemia.  -  Perfusion imaging: There were no perfusion defects.  -  Gated SPECT: The calculated left ventricular ejection fraction was 51 %. Left ventricular ejection fraction was within normal limits by visual estimate. There was no left ventricular regional abnormality.  IMPRESSIONS: Normal study after maximal exercise without reproduction of symptoms. Myocardial perfusion imaging was normal at rest and with stress. Left ventricular systolic function was normal.     24-HOUR HOLTER MONITOR May 2017: Predominant sinus rhythm with average heart rate 76.  Heart rate 54 maximal heart rate 126.  No significant dysrhythmia.  There were 10 premature atrial contractions and 1 atrial run with 3 beats.       ECHOCARDIOGRAM 5/9/2017: Normal LV size and function, EF 70%, normal diastolic function, normal RV size and function, normal left and right atrial size, no significant valvular stenosis or regurgitation, no obvious pulmonary hypertension no pericardial effusion.    Assessment & Plan  Paroxysmal supraventricular tachycardia (HCC)    Mr. Gil Mcdonald did experience 1 more episode of supraventricular tachycardia event since his  emergency room visit on 24th November.  Tachycardia initiation and termination is suggestive of short RP tachycardia.  Although there was no demonstratable AH jump on the ECG I still suspect this was an AV gabriel reentry tachycardia.  Episode lasted hour and a half.  He is now on beta-blocker therapy and is noted to be bradycardic.  He is asymptomatic though he has some fatigue.  Electrolytes at last testing were normal.  Tachycardia ECG is noted in documentation(in medical chart)    -- I am advising him to continue current medications.  -- We will get an echocardiogram to assess cardiac structure and function.  -- I am referring him to our electrophysiology colleagues to consider for EP study and ablation especially if he has any more episodes of tachycardia.  -- I will tentatively place a 3-month follow-up visit with me however if he establish care with electrophysiologist than the appointment can be canceled with me.    Orders:    Echo complete w/ contrast if indicated; Future    Ambulatory referral to Cardiac Electrophysiology; Future    Bradycardia  Has asymptomatic bradycardia.  Likely due to being on beta-blocker therapy.  Advise continue to monitor heart rate and blood pressure from time to time.  As long as heart rate is above 40 and as long as his asymptomatic it is okay for her to have a low heart rate.  Goal systolic blood pressure is at or less than 130 mmHg and diastolic blood pressure less than 80 mmHg.           History of Present Illness   HPI  Gil Mcdonald is a 54 y.o. male who presents regarding follow-up of his diagnosis of supraventricular tachycardia which was detected during ER visit in November 2024.  He has recently completed an extended Holter monitor.  Is here for visit accompanied with his wife.     Since last visit he has had no new diagnoses or hospitalizations.  He did experience symptoms of palpitations a couple of times.  1 event was significant lasting for almost an hour and a  half and this was recorded on the extended Holter monitor.  Other than that he denies unusual chest pain or shortness of breath or palpitations or passing out or near passing out episodes.  He has noticed that his heart rate has been low at times and he does feel fatigued.  Has had no orthopnea or PND or worsening pedal edema.  Has had no presyncope/syncope.  Does report experiencing Raynaud's phenomenon like symptoms with paleness in the finger digits since starting metoprolol.    He does mention that he learned that his sister had an arrhythmia and underwent ablation in her 20s.    History obtained from: patient    Functional capacity status: Excellent   (Excellent- >10 METs; Good: (7-10 METs); Moderate (4-7 METs); Poor (<= 4 METs)    Any chronic stressors: None  (feeling of poor health, financial problems, and social isolation etc).    Tobacco or alcohol dependence: He has never been a smoker.  Reports drinking regularly but not excessively.  Has 1 to 2 cups of coffee a day.    Current cardiac medications: Metoprolol tartrate 25 mg in the morning 12.5 mg in the evening.  Diltiazem short acting 30 mg as needed during tachycardia events, rosuvastatin 10 mg daily.  Colchicine used occasionally for gout exacerbation.    Last recent comprehensive blood work available:   Blood work 11/24/2024 sodium 138 potassium 5.0 chloride 104 bicarb 28 BUN 28 creatinine 0.65   AST slightly increased at 69 otherwise normal LFTs  High-sensitivity troponins negative x 2.  Normal CBC  TSH 1.438  Lipid profile 10/21/2024 total cholesterol 173 triglyceride 78 HDL 85 calculated LDL 72  Hemoglobin A1c 5.5 10/21/2024    The 10-year ASCVD risk score (Juvenal LONDON, et al., 2019) is: 3%    Values used to calculate the score:      Age: 54 years      Sex: Male      Is Non- : No      Diabetic: No      Tobacco smoker: No      Systolic Blood Pressure: 126 mmHg      Is BP treated: Yes      HDL Cholesterol: 85 mg/dL       Total Cholesterol: 173 mg/dL  (Low risk: Less than <5%; borderline risk: >=5% to <7.5%; intermediate risk: >=7.5% to <20%; high risk:>=20%)  Patient's ASCVD risk category: Low risk.    Major cardiac risk factors: Previous history of smoking.  (Tobacco use, Hypertension, Family history of CHD, Primary severe hypercholesterolemia, DM, multiple major and risk enhancing factors)     Any cardiac clinical risk enhancers from available data:  family history of CAD at a later age.  No family history of premature CAD or sudden cardiac death.  (Family history of premature CAD, patient's history of chronic kidney disease, primary hypercholesterolemia, metabolic syndrome, abnormal OMARI, inflammatory condition such as RA-HIV-psoriasis, RA-HIV-Psoriasis,, pregnancy related complications such as preeclampsia or premature delivery, early menopause, high risk ethnicity such as [place] , social deprivation, physical inactivity, nonalcoholic fatty liver disease psychosocial stress, major psychiatric illness, atrial fibrillation, left ventricular hypertrophy, obstructive sleep apnea, nonalcoholic fatty liver disease).    ECG: No results found for this visit on 01/02/25.    Cardiac rhythm in office today:    REVIEW OF SYSTEMS   Positive for: As noted above in HPI  Negative for: All remaining as reviewed below and in HPI.    SYSTEM SYMPTOMS REVIEWED:  General--weight change, fever, night sweats  Respiratory--cough, wheezing, shortness of breath, sputum production  Cardiovascular--chest pain, syncope, dyspnea on exertion, edema, decline in exercise tolerance, claudication   Gastrointestinal--persistent vomiting, diarrhea, abdominal distention, blood in stool   Muscular or skeletal--joint pain or swelling   Neurologic--headaches, syncope, abnormal movement  Hematologic--history of easy bruising and bleeding   Endocrine--thyroid enlargement, heat or cold intolerance, polyuria   Psychiatric--anxiety, depression     CURRENT MEDICATIONS  "LIST     Current Outpatient Medications:     celecoxib (CeleBREX) 200 mg capsule, take 1 capsule by mouth twice a day, Disp: 60 capsule, Rfl: 5    colchicine (COLCRYS) 0.6 mg tablet, Take 1 po q 1 hour PRN till pain relief or diarrhea, Disp: 30 tablet, Rfl: 5    diltiazem (CARDIZEM) 30 mg tablet, Take 1 tablet (30 mg total) by mouth as needed (Tachycardia) for up to 10 doses, Disp: 120 tablet, Rfl: 0    metoprolol tartrate (LOPRESSOR) 25 mg tablet, Take 0.5 tablets (12.5 mg total) by mouth 2 (two) times a day, Disp: 30 tablet, Rfl: 0    Multiple Vitamin (multivitamin) tablet, Take 1 tablet by mouth daily, Disp: 30 tablet, Rfl: 11    rosuvastatin (CRESTOR) 10 MG tablet, TAKE 1 TABLET DAILY, Disp: 90 tablet, Rfl: 1    senna-docusate sodium (SENOKOT S) 8.6-50 mg per tablet, Take 1 tablet by mouth daily, Disp: 30 tablet, Rfl: 0       ALLERGIES     Allergies   Allergen Reactions    Penicillins Throat Swelling    Lipitor [Atorvastatin] GI Intolerance     Stomach bothered him       *-*-*-*-*-*-*-*-*-*-*-*-*-*-*-*-*-*-*-*-*-*-*-*-*-*-*-*-*-*-*-*-*-*-*-*-*-*-*-*-*-*-*-*-*-*-*-*-*-*-*-*-*-*-         Objective   /72   Pulse 57   Ht 5' 10\" (1.778 m)   Wt 91.2 kg (201 lb)   SpO2 100%   BMI 28.84 kg/m²     Physical Exam  Constitutional:       Appearance: Normal appearance. He is normal weight.   HENT:      Mouth/Throat:      Mouth: Mucous membranes are moist.   Cardiovascular:      Rate and Rhythm: Regular rhythm. Bradycardia present.      Heart sounds: No murmur heard.  Pulmonary:      Breath sounds: Normal breath sounds.   Abdominal:      General: Abdomen is flat.   Musculoskeletal:         General: No tenderness.      Cervical back: Neck supple.      Right lower leg: No edema.      Left lower leg: No edema.   Skin:     General: Skin is warm and dry.   Neurological:      General: No focal deficit present.      Mental Status: He is alert and oriented to person, place, and time. Mental status is at baseline.           "

## 2025-01-07 ENCOUNTER — OFFICE VISIT (OUTPATIENT)
Age: 55
End: 2025-01-07

## 2025-01-07 VITALS — WEIGHT: 201 LBS | BODY MASS INDEX: 28.77 KG/M2 | HEIGHT: 70 IN

## 2025-01-07 DIAGNOSIS — Z96.651 STATUS POST UNILATERAL KNEE REPLACEMENT, RIGHT: Primary | ICD-10-CM

## 2025-01-07 PROCEDURE — 99024 POSTOP FOLLOW-UP VISIT: CPT | Performed by: STUDENT IN AN ORGANIZED HEALTH CARE EDUCATION/TRAINING PROGRAM

## 2025-01-07 NOTE — PROGRESS NOTES
Subjective: Patient seen and examined. Pain well-controlled. Progressing well, he is ambulating unassisted. Incision well-healed. Denies fevers or chills.  He is happy with his progress so far. He is no longer in formal PT.  He is getting back to his normal activities and is back at the gym.  He has been doing hamstring curls with slight discomfort, but otherwise doing well.    Physical Exam:  Incision: CDI, no erythema or drainage noted  ROM: 0-125  5/5 IP/Q/HS/TA/GS, 2+ DP/PT, SILT DP/SP/S/S/TN    Assessment/Plan:  55 y/o male doing excellent almost 8 weeks s/p Right medial UKA from 11/14/24.    - continue multi-modal pain control   - Weight bearing status: WBAT RLE  - DVT ppx: complete  - Incision care: No restrictions  - PT/OT, may transition to HEP.  - F/U 4-6 weeks then 10 months for routine surveillance       Scribe Attestation      I,:  Jennifer Jeffries PA-C am acting as a scribe while in the presence of the attending physician.:       I,:  Gil Flores DO personally performed the services described in this documentation    as scribed in my presence.:

## 2025-01-10 ENCOUNTER — HOSPITAL ENCOUNTER (OUTPATIENT)
Dept: NON INVASIVE DIAGNOSTICS | Age: 55
Discharge: HOME | End: 2025-01-10
Payer: COMMERCIAL

## 2025-01-10 VITALS — HEART RATE: 52 BPM | DIASTOLIC BLOOD PRESSURE: 72 MMHG | SYSTOLIC BLOOD PRESSURE: 126 MMHG

## 2025-01-10 DIAGNOSIS — I47.10 PAROXYSMAL SUPRAVENTRICULAR TACHYCARDIA (HCC): ICD-10-CM

## 2025-01-10 PROCEDURE — 93306 TTE W/DOPPLER COMPLETE: CPT

## 2025-01-10 PROCEDURE — 93356 MYOCRD STRAIN IMG SPCKL TRCK: CPT

## 2025-01-12 LAB
AORTIC ROOT: 3.6 CM
AORTIC VALVE MEAN VELOCITY: 9.7 M/S
AV LVOT MEAN GRADIENT: 2 MMHG
AV LVOT PEAK GRADIENT: 3 MMHG
AV MEAN PRESS GRAD SYS DOP V1V2: 4 MMHG
AV PEAK GRADIENT: 8 MMHG
AV VELOCITY RATIO: 0.63
AV VMAX SYS DOP: 1.44 M/S
DOP CALC AO VTI: 31.89 CM
DOP CALC LVOT PEAK VEL VTI: 20.43 CM
DOP CALC LVOT PEAK VEL: 0.91 M/S
DOP CALC MV VTI: 29 CM
E WAVE DECELERATION TIME: 146 MS
E/A RATIO: 1.04
FRACTIONAL SHORTENING: 31 (ref 28–44)
GLOBAL LONGITUIDAL STRAIN: -19 %
INTERVENTRICULAR SEPTUM IN DIASTOLE (PARASTERNAL SHORT AXIS VIEW): 1.3 CM
INTERVENTRICULAR SEPTUM: 1.3 CM (ref 0.6–1.1)
LAAS-AP2: 25.3 CM2
LAAS-AP4: 22.9 CM2
LEFT ATRIUM SIZE: 4.3 CM
LEFT ATRIUM VOLUME (MOD BIPLANE): 83 ML
LEFT ATRIUM VOLUME INDEX (MOD BIPLANE): 39.7 ML/M2
LEFT INTERNAL DIMENSION IN SYSTOLE: 3.6 CM (ref 2.1–4)
LEFT VENTRICLE DIASTOLIC VOLUME (MOD BIPLANE): 150 ML
LEFT VENTRICLE SYSTOLIC VOLUME (MOD BIPLANE): 59 ML
LEFT VENTRICULAR INTERNAL DIMENSION IN DIASTOLE: 5.2 CM (ref 3.5–6)
LEFT VENTRICULAR POSTERIOR WALL IN END DIASTOLE: 0.9 CM
LEFT VENTRICULAR STROKE VOLUME: 74 ML
LV EF BIPLANE MOD: 61 %
LV EF US.2D.A4C+ESTIMATED: 62 %
LVSV (TEICH): 74 ML
MV E'TISSUE VEL-LAT: 14 CM/S
MV E'TISSUE VEL-SEP: 9 CM/S
MV MEAN GRADIENT: 1 MMHG
MV PEAK A VEL: 0.75 M/S
MV PEAK E VEL: 78 CM/S
MV PEAK GRADIENT: 4 MMHG
MV STENOSIS PRESSURE HALF TIME: 42 MS
MV VALVE AREA P 1/2 METHOD: 5.24
RIGHT ATRIAL 2D VOLUME: 57 ML
RIGHT ATRIUM AREA SYSTOLE A4C: 19.6 CM2
RIGHT VENTRICLE ID DIMENSION: 4.4 CM
SL CV LEFT ATRIUM LENGTH A2C: 5.8 CM
SL CV LV EF: 61
SL CV PED ECHO LEFT VENTRICLE DIASTOLIC VOLUME (MOD BIPLANE) 2D: 127 ML
SL CV PED ECHO LEFT VENTRICLE SYSTOLIC VOLUME (MOD BIPLANE) 2D: 54 ML
TR MAX PG: 22 MMHG
TR PEAK VELOCITY: 2.4 M/S
TRICUSPID ANNULAR PLANE SYSTOLIC EXCURSION: 2.3 CM
TRICUSPID VALVE PEAK REGURGITATION VELOCITY: 2.35 M/S

## 2025-01-12 PROCEDURE — 93356 MYOCRD STRAIN IMG SPCKL TRCK: CPT | Performed by: INTERNAL MEDICINE

## 2025-01-12 PROCEDURE — 93306 TTE W/DOPPLER COMPLETE: CPT | Performed by: INTERNAL MEDICINE

## 2025-01-13 DIAGNOSIS — I49.9 ARRHYTHMIA: ICD-10-CM

## 2025-01-13 RX ORDER — METOPROLOL TARTRATE 25 MG/1
TABLET, FILM COATED ORAL
Qty: 135 TABLET | Refills: 0 | Status: CANCELLED | OUTPATIENT
Start: 2025-01-13 | End: 2025-02-12

## 2025-02-09 DIAGNOSIS — M17.11 PRIMARY OSTEOARTHRITIS OF RIGHT KNEE: ICD-10-CM

## 2025-02-10 DIAGNOSIS — M17.11 PRIMARY OSTEOARTHRITIS OF RIGHT KNEE: ICD-10-CM

## 2025-02-11 RX ORDER — CELECOXIB 200 MG/1
200 CAPSULE ORAL 2 TIMES DAILY
Qty: 60 CAPSULE | Refills: 5 | Status: SHIPPED | OUTPATIENT
Start: 2025-02-11 | End: 2025-02-19 | Stop reason: SDUPTHER

## 2025-02-19 DIAGNOSIS — M17.11 PRIMARY OSTEOARTHRITIS OF RIGHT KNEE: ICD-10-CM

## 2025-02-19 DIAGNOSIS — I49.9 ARRHYTHMIA: ICD-10-CM

## 2025-02-19 RX ORDER — CELECOXIB 200 MG/1
200 CAPSULE ORAL 2 TIMES DAILY
Qty: 90 CAPSULE | Refills: 1 | Status: SHIPPED | OUTPATIENT
Start: 2025-02-19

## 2025-02-21 RX ORDER — METOPROLOL TARTRATE 25 MG/1
TABLET, FILM COATED ORAL
Qty: 135 TABLET | Refills: 0 | Status: SHIPPED | OUTPATIENT
Start: 2025-02-21 | End: 2025-05-22

## 2025-04-11 ENCOUNTER — OFFICE VISIT (OUTPATIENT)
Dept: CARDIOLOGY CLINIC | Facility: CLINIC | Age: 55
End: 2025-04-11
Payer: COMMERCIAL

## 2025-04-11 VITALS
HEART RATE: 53 BPM | SYSTOLIC BLOOD PRESSURE: 128 MMHG | WEIGHT: 180 LBS | DIASTOLIC BLOOD PRESSURE: 82 MMHG | BODY MASS INDEX: 25.77 KG/M2 | HEIGHT: 70 IN

## 2025-04-11 DIAGNOSIS — R00.1 BRADYCARDIA: ICD-10-CM

## 2025-04-11 DIAGNOSIS — E78.2 MIXED HYPERLIPIDEMIA: ICD-10-CM

## 2025-04-11 DIAGNOSIS — I10 ESSENTIAL HYPERTENSION: ICD-10-CM

## 2025-04-11 DIAGNOSIS — I47.10 PAROXYSMAL SUPRAVENTRICULAR TACHYCARDIA (HCC): Primary | ICD-10-CM

## 2025-04-11 DIAGNOSIS — I49.1 PAC (PREMATURE ATRIAL CONTRACTION): ICD-10-CM

## 2025-04-11 DIAGNOSIS — R09.89 HYPERDYNAMIC CIRCULATION: ICD-10-CM

## 2025-04-11 PROCEDURE — 99245 OFF/OP CONSLTJ NEW/EST HI 55: CPT | Performed by: INTERNAL MEDICINE

## 2025-04-11 PROCEDURE — 93000 ELECTROCARDIOGRAM COMPLETE: CPT | Performed by: INTERNAL MEDICINE
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
## 2025-04-13 DIAGNOSIS — M17.11 PRIMARY OSTEOARTHRITIS OF RIGHT KNEE: ICD-10-CM

## 2025-04-15 RX ORDER — CELECOXIB 200 MG/1
200 CAPSULE ORAL 2 TIMES DAILY
Qty: 90 CAPSULE | Refills: 0 | Status: SHIPPED | OUTPATIENT
Start: 2025-04-15

## 2025-05-15 DIAGNOSIS — E78.2 MIXED HYPERLIPIDEMIA: ICD-10-CM

## 2025-05-15 RX ORDER — ROSUVASTATIN CALCIUM 10 MG/1
10 TABLET, COATED ORAL DAILY
Qty: 90 TABLET | Refills: 0 | Status: SHIPPED | OUTPATIENT
Start: 2025-05-15

## 2025-05-15 NOTE — TELEPHONE ENCOUNTER
Called pt as needed, advise medication has been refilled. Offered to schedule an appointment for patient and patient has declined states he will call to schedule an appointment.     Thank you,   Evelin

## 2025-06-02 ENCOUNTER — TELEPHONE (OUTPATIENT)
Age: 55
End: 2025-06-02

## 2025-06-02 ENCOUNTER — PREP FOR PROCEDURE (OUTPATIENT)
Dept: CARDIOLOGY CLINIC | Facility: CLINIC | Age: 55
End: 2025-06-02

## 2025-06-02 DIAGNOSIS — I47.10 PAROXYSMAL SUPRAVENTRICULAR TACHYCARDIA (HCC): Primary | ICD-10-CM

## 2025-06-02 NOTE — TELEPHONE ENCOUNTER
Caller: Gil Mcdonald    Doctor: Dr. Allison    Reason for call: pt spoke to Dr. Allison and was told he needed an ablasion. Pt has thought it over and decided he wants to go through with it, and needs an order put in for it so he can schedule it.    Call back#: [phone]

## 2025-06-02 NOTE — TELEPHONE ENCOUNTER
Patient wants to think at home and will call if he wants to proceed with ablation  If wants to proceed with ablation should hold diltiazem and metoprolol for 3 days prior to procedure  NAVX      Patient scheduled for   SVT   at [facility] on  7/24/25   with Dr Allison.  Patient aware of general instructions; labs test required.   Meds holds:   Diltiazem and Metoprolol to hold 3 day's prior to procedure.    Dr Allison, can you please review that this patient is been schedule correctly, since we did not received referral from you due that patient was taking his time to think about proceed.  Thank you.

## 2025-06-03 NOTE — TELEPHONE ENCOUNTER
Patient aware procedure referred by Dr Allison to be done on 7/24/25 is an SVT ablation. Make all the pertinent corrections.

## 2025-06-25 ENCOUNTER — APPOINTMENT (OUTPATIENT)
Dept: LAB | Facility: MEDICAL CENTER | Age: 55
End: 2025-06-25
Payer: COMMERCIAL

## 2025-06-25 DIAGNOSIS — I47.10 PAROXYSMAL SUPRAVENTRICULAR TACHYCARDIA (HCC): ICD-10-CM

## 2025-06-25 DIAGNOSIS — E78.2 MIXED HYPERLIPIDEMIA: ICD-10-CM

## 2025-06-25 DIAGNOSIS — R73.9 HYPERGLYCEMIA: ICD-10-CM

## 2025-06-25 LAB
ALBUMIN SERPL BCG-MCNC: 4.6 G/DL (ref 3.5–5)
ALP SERPL-CCNC: 43 U/L (ref 34–104)
ALT SERPL W P-5'-P-CCNC: 22 U/L (ref 7–52)
ANION GAP SERPL CALCULATED.3IONS-SCNC: 10 MMOL/L (ref 4–13)
AST SERPL W P-5'-P-CCNC: 32 U/L (ref 13–39)
BASOPHILS # BLD AUTO: 0.03 THOUSANDS/ÂΜL (ref 0–0.1)
BASOPHILS NFR BLD AUTO: 0 % (ref 0–1)
BILIRUB SERPL-MCNC: 1.28 MG/DL (ref 0.2–1)
BUN SERPL-MCNC: 21 MG/DL (ref 5–25)
CALCIUM SERPL-MCNC: 9.6 MG/DL (ref 8.4–10.2)
CHLORIDE SERPL-SCNC: 101 MMOL/L (ref 96–108)
CHOLEST SERPL-MCNC: 181 MG/DL (ref ?–200)
CO2 SERPL-SCNC: 28 MMOL/L (ref 21–32)
CREAT SERPL-MCNC: 0.85 MG/DL (ref 0.6–1.3)
EOSINOPHIL # BLD AUTO: 0.13 THOUSAND/ÂΜL (ref 0–0.61)
EOSINOPHIL NFR BLD AUTO: 2 % (ref 0–6)
ERYTHROCYTE [DISTWIDTH] IN BLOOD BY AUTOMATED COUNT: 13.2 % (ref 11.6–15.1)
GFR SERPL CREATININE-BSD FRML MDRD: 98 ML/MIN/1.73SQ M
GLUCOSE P FAST SERPL-MCNC: 105 MG/DL (ref 65–99)
HCT VFR BLD AUTO: 42 % (ref 36.5–49.3)
HDLC SERPL-MCNC: 103 MG/DL
HGB BLD-MCNC: 13.9 G/DL (ref 12–17)
IMM GRANULOCYTES # BLD AUTO: 0.01 THOUSAND/UL (ref 0–0.2)
IMM GRANULOCYTES NFR BLD AUTO: 0 % (ref 0–2)
LDLC SERPL CALC-MCNC: 66 MG/DL (ref 0–100)
LYMPHOCYTES # BLD AUTO: 1.54 THOUSANDS/ÂΜL (ref 0.6–4.47)
LYMPHOCYTES NFR BLD AUTO: 23 % (ref 14–44)
MCH RBC QN AUTO: 31 PG (ref 26.8–34.3)
MCHC RBC AUTO-ENTMCNC: 33.1 G/DL (ref 31.4–37.4)
MCV RBC AUTO: 94 FL (ref 82–98)
MONOCYTES # BLD AUTO: 0.74 THOUSAND/ÂΜL (ref 0.17–1.22)
MONOCYTES NFR BLD AUTO: 11 % (ref 4–12)
NEUTROPHILS # BLD AUTO: 4.31 THOUSANDS/ÂΜL (ref 1.85–7.62)
NEUTS SEG NFR BLD AUTO: 64 % (ref 43–75)
NONHDLC SERPL-MCNC: 78 MG/DL
NRBC BLD AUTO-RTO: 0 /100 WBCS
PLATELET # BLD AUTO: 218 THOUSANDS/UL (ref 149–390)
PMV BLD AUTO: 10.8 FL (ref 8.9–12.7)
POTASSIUM SERPL-SCNC: 4.4 MMOL/L (ref 3.5–5.3)
PROT SERPL-MCNC: 7.1 G/DL (ref 6.4–8.4)
RBC # BLD AUTO: 4.48 MILLION/UL (ref 3.88–5.62)
SODIUM SERPL-SCNC: 139 MMOL/L (ref 135–147)
TRIGL SERPL-MCNC: 58 MG/DL (ref ?–150)
WBC # BLD AUTO: 6.76 THOUSAND/UL (ref 4.31–10.16)

## 2025-06-25 PROCEDURE — 36415 COLL VENOUS BLD VENIPUNCTURE: CPT

## 2025-06-25 PROCEDURE — 85025 COMPLETE CBC W/AUTO DIFF WBC: CPT

## 2025-06-25 PROCEDURE — 80061 LIPID PANEL: CPT

## 2025-06-25 PROCEDURE — 80053 COMPREHEN METABOLIC PANEL: CPT

## 2025-06-27 ENCOUNTER — OFFICE VISIT (OUTPATIENT)
Dept: FAMILY MEDICINE CLINIC | Facility: CLINIC | Age: 55
End: 2025-06-27
Payer: COMMERCIAL

## 2025-06-27 VITALS
SYSTOLIC BLOOD PRESSURE: 110 MMHG | RESPIRATION RATE: 18 BRPM | WEIGHT: 196 LBS | BODY MASS INDEX: 28.06 KG/M2 | HEIGHT: 70 IN | TEMPERATURE: 97.4 F | OXYGEN SATURATION: 97 % | DIASTOLIC BLOOD PRESSURE: 70 MMHG | HEART RATE: 61 BPM

## 2025-06-27 DIAGNOSIS — M17.11 PRIMARY OSTEOARTHRITIS OF RIGHT KNEE: ICD-10-CM

## 2025-06-27 DIAGNOSIS — I47.10 PAROXYSMAL SUPRAVENTRICULAR TACHYCARDIA (HCC): ICD-10-CM

## 2025-06-27 DIAGNOSIS — E78.2 MIXED HYPERLIPIDEMIA: Primary | ICD-10-CM

## 2025-06-27 DIAGNOSIS — R73.9 HYPERGLYCEMIA: ICD-10-CM

## 2025-06-27 DIAGNOSIS — M1A.9XX0 CHRONIC GOUT INVOLVING TOE WITHOUT TOPHUS, UNSPECIFIED CAUSE, UNSPECIFIED LATERALITY: ICD-10-CM

## 2025-06-27 DIAGNOSIS — I49.1 PAC (PREMATURE ATRIAL CONTRACTION): ICD-10-CM

## 2025-06-27 PROCEDURE — 99214 OFFICE O/P EST MOD 30 MIN: CPT | Performed by: FAMILY MEDICINE

## 2025-06-27 NOTE — H&P (VIEW-ONLY)
"Name: Gil Mcdonald      : 1970      MRN: [id number]  Encounter Provider: Rocky Price MD  Encounter Date: 2025   Encounter department: [facility]  :  Assessment & Plan  Mixed hyperlipidemia  Cholesterol is doing extremely well.  Continue Crestor 10.  No change.  Orders:  •  Comprehensive metabolic panel; Future  •  Lipid panel; Future    Hyperglycemia  Blood sugar was minimally elevated.  Continue with limiting carbohydrates.  Orders:  •  Comprehensive metabolic panel; Future    Chronic gout involving toe without tophus, unspecified cause, unspecified laterality  Stable.  Has colchicine, has not needed to use lately.  Consider recheck of uric acid in 6 months.  Orders:  •  Uric acid; Future    PAC (premature atrial contraction)  Patient is going to cardiology for ablation.  This is scheduled for July.       Primary osteoarthritis of right knee  Following with orthopedics.       Paroxysmal supraventricular tachycardia (HCC)  Please see discussion of PACs.  Following with cardiology.              History of Present Illness   Patient is here to follow-up on multiple issues.    No specific issues with gout currently.    Hyperlipidemia: Reviewed labs.    Patient did have knee replacement.  Reporting that he is doing relatively well with this.          Review of Systems   Constitutional: Negative.    HENT: Negative.     Eyes: Negative.    Respiratory: Negative.     Cardiovascular: Negative.    Gastrointestinal: Negative.    Endocrine: Negative.    Genitourinary: Negative.    Musculoskeletal: Negative.    Skin: Negative.    Allergic/Immunologic: Negative.    Neurological: Negative.    Hematological: Negative.    Psychiatric/Behavioral: Negative.         Objective   /70 (BP Location: Right arm, Patient Position: Sitting, Cuff Size: Adult)   Pulse 61   Temp (!) 97.4 °F (36.3 °C) (Tympanic)   Resp 18   Ht 5' 10\" (1.778 m)   Wt 88.9 kg (196 lb)   SpO2 97%   BMI " 28.12 kg/m²      White count 6.76, hemoglobin 13.9, hematocrit 42.0, platelets 218.  Total cholesterol 181, LDL 66, , triglycerides 58.  Sodium 139, potassium 4.4, calcium 9.6.  Blood sugar 105.  Creatinine 0.85, GFR 98.  AST 32, ALT 22.    Physical Exam  Vitals and nursing note reviewed.   Constitutional:       Appearance: Normal appearance. He is well-developed.   HENT:      Head: Normocephalic and atraumatic.     Cardiovascular:      Rate and Rhythm: Normal rate and regular rhythm.      Pulses:           Carotid pulses are 2+ on the right side and 2+ on the left side.     Heart sounds: Normal heart sounds. No murmur heard.     No friction rub. No gallop.   Pulmonary:      Effort: Pulmonary effort is normal. No respiratory distress.      Breath sounds: Normal breath sounds. No wheezing or rales.     Musculoskeletal:      Cervical back: Normal range of motion and neck supple.     Neurological:      Mental Status: He is alert.

## 2025-06-27 NOTE — ASSESSMENT & PLAN NOTE
Stable.  Has colchicine, has not needed to use lately.  Consider recheck of uric acid in 6 months.  Orders:  •  Uric acid; Future

## 2025-06-27 NOTE — PATIENT INSTRUCTIONS
1. Mixed hyperlipidemia  Assessment & Plan:  Cholesterol is doing extremely well.  Continue Crestor 10.  No change.  Orders:    Comprehensive metabolic panel; Future    Lipid panel; Future    Orders:  -     Comprehensive metabolic panel; Future; Expected date: 12/27/2025  -     Lipid panel; Future; Expected date: 12/27/2025  2. Hyperglycemia  Assessment & Plan:  Blood sugar was minimally elevated.  Continue with limiting carbohydrates.  Orders:    Comprehensive metabolic panel; Future    Orders:  -     Comprehensive metabolic panel; Future; Expected date: 12/27/2025  3. 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  Has colchicine, has not needed to use lately.  Consider recheck of uric acid in 6 months.  Orders:    Uric acid; Future    Orders:  -     Uric acid; Future; Expected date: 12/27/2025 (Use expected date for collection)  4. PAC (premature atrial contraction)  Assessment & Plan:  Patient is going to cardiology for ablation.  This is scheduled for July.       5. Primary osteoarthritis of right knee  Assessment & Plan:  Following with orthopedics.       6. Paroxysmal supraventricular tachycardia (HCC)  Assessment & Plan:  Please see discussion of PACs.  Following with cardiology.           COVID 19 Instructions    Gil ANNETTE Mcdonald was advised to limit contact with others to essential tasks such as getting food, medications, and medical care.    Proper handwashing reviewed, and Hand sanitzer when washing is not available.    If the patient develops symptoms of COVID 19, the patient should call the office as soon as possible.    It is strongly recommended that Flu Vaccinations be obtained.      Virtual Visits:  You should get a text message when the provider is ready to see you.  Click on the link in the text message, and the call should start.  Make sure you allow camera and microphone access.  This is HIPPA compliant, and secure.  Also, you  could access this visit from [employer] in the [facility] Mobile kandy.      If you have not already done so, get immunized to COVID 19.      We are committed to getting you vaccinated as soon as possible and will be closely following CDC and [facility] guidelines as they are released and revised.  Please refer to our COVID-19 vaccine webpage for the most up to date information on the vaccine and our distribution efforts.    This site will also have the most up to date recommendations for COVID booster vaccine.    https://[email or web address].org/covid-19/protect-[email or web address]-[email or web address]-vaccine    Call [phone] ([phone]), option 7    You can also visit https://www.vaccines.gov/ to find vaccines in your area.    OUR LOCATION:    [facility]  [address] [place] [address], [address]  [place], [address] [phone]  Fax: [phone]    Lab services, Rheumatology, and OB/GYN are at this location as well.

## 2025-06-27 NOTE — ASSESSMENT & PLAN NOTE
Blood sugar was minimally elevated.  Continue with limiting carbohydrates.  Orders:  •  Comprehensive metabolic panel; Future

## 2025-06-27 NOTE — ASSESSMENT & PLAN NOTE
Cholesterol is doing extremely well.  Continue Crestor 10.  No change.  Orders:  •  Comprehensive metabolic panel; Future  •  Lipid panel; Future

## 2025-06-27 NOTE — PROGRESS NOTES
"Name: Gil Mcdonald      : 1970      MRN: [id number]  Encounter Provider: Rocky Price MD  Encounter Date: 2025   Encounter department: [facility]  :  Assessment & Plan  Mixed hyperlipidemia  Cholesterol is doing extremely well.  Continue Crestor 10.  No change.  Orders:  •  Comprehensive metabolic panel; Future  •  Lipid panel; Future    Hyperglycemia  Blood sugar was minimally elevated.  Continue with limiting carbohydrates.  Orders:  •  Comprehensive metabolic panel; Future    Chronic gout involving toe without tophus, unspecified cause, unspecified laterality  Stable.  Has colchicine, has not needed to use lately.  Consider recheck of uric acid in 6 months.  Orders:  •  Uric acid; Future    PAC (premature atrial contraction)  Patient is going to cardiology for ablation.  This is scheduled for July.       Primary osteoarthritis of right knee  Following with orthopedics.       Paroxysmal supraventricular tachycardia (HCC)  Please see discussion of PACs.  Following with cardiology.              History of Present Illness   Patient is here to follow-up on multiple issues.    No specific issues with gout currently.    Hyperlipidemia: Reviewed labs.    Patient did have knee replacement.  Reporting that he is doing relatively well with this.          Review of Systems   Constitutional: Negative.    HENT: Negative.     Eyes: Negative.    Respiratory: Negative.     Cardiovascular: Negative.    Gastrointestinal: Negative.    Endocrine: Negative.    Genitourinary: Negative.    Musculoskeletal: Negative.    Skin: Negative.    Allergic/Immunologic: Negative.    Neurological: Negative.    Hematological: Negative.    Psychiatric/Behavioral: Negative.         Objective   /70 (BP Location: Right arm, Patient Position: Sitting, Cuff Size: Adult)   Pulse 61   Temp (!) 97.4 °F (36.3 °C) (Tympanic)   Resp 18   Ht 5' 10\" (1.778 m)   Wt 88.9 kg (196 lb)   SpO2 97%   BMI " 28.12 kg/m²      White count 6.76, hemoglobin 13.9, hematocrit 42.0, platelets 218.  Total cholesterol 181, LDL 66, , triglycerides 58.  Sodium 139, potassium 4.4, calcium 9.6.  Blood sugar 105.  Creatinine 0.85, GFR 98.  AST 32, ALT 22.    Physical Exam  Vitals and nursing note reviewed.   Constitutional:       Appearance: Normal appearance. He is well-developed.   HENT:      Head: Normocephalic and atraumatic.     Cardiovascular:      Rate and Rhythm: Normal rate and regular rhythm.      Pulses:           Carotid pulses are 2+ on the right side and 2+ on the left side.     Heart sounds: Normal heart sounds. No murmur heard.     No friction rub. No gallop.   Pulmonary:      Effort: Pulmonary effort is normal. No respiratory distress.      Breath sounds: Normal breath sounds. No wheezing or rales.     Musculoskeletal:      Cervical back: Normal range of motion and neck supple.     Neurological:      Mental Status: He is alert.

## 2025-06-29 DIAGNOSIS — M17.11 PRIMARY OSTEOARTHRITIS OF RIGHT KNEE: ICD-10-CM

## 2025-06-29 DIAGNOSIS — E78.2 MIXED HYPERLIPIDEMIA: ICD-10-CM

## 2025-06-29 RX ORDER — ROSUVASTATIN CALCIUM 10 MG/1
10 TABLET, COATED ORAL DAILY
Qty: 90 TABLET | Refills: 3 | Status: SHIPPED | OUTPATIENT
Start: 2025-06-29

## 2025-06-29 RX ORDER — CELECOXIB 200 MG/1
200 CAPSULE ORAL 2 TIMES DAILY
Qty: 90 CAPSULE | Refills: 0 | Status: SHIPPED | OUTPATIENT
Start: 2025-06-29

## 2025-07-13 DIAGNOSIS — I49.9 ARRHYTHMIA: ICD-10-CM

## 2025-07-16 RX ORDER — METOPROLOL TARTRATE 25 MG/1
TABLET, FILM COATED ORAL
Qty: 135 TABLET | Refills: 3 | Status: ON HOLD | OUTPATIENT
Start: 2025-07-16 | End: 2025-07-25

## 2025-07-16 NOTE — TELEPHONE ENCOUNTER
Per Laci's last visit note he stated that if the pt establishes w/ EP appt w/ him can be canceled.     Refill request forwarded to Dr. Allison.    The patient is a 80y Female complaining of

## 2025-07-23 ENCOUNTER — ANESTHESIA EVENT (OUTPATIENT)
Dept: NON INVASIVE DIAGNOSTICS | Facility: HOSPITAL | Age: 55
End: 2025-07-23
Payer: COMMERCIAL

## 2025-07-23 RX ORDER — SODIUM CHLORIDE 9 MG/ML
20 INJECTION, SOLUTION INTRAVENOUS CONTINUOUS
Status: CANCELLED | OUTPATIENT
Start: 2025-07-23

## 2025-07-23 RX ORDER — SODIUM CHLORIDE, SODIUM LACTATE, POTASSIUM CHLORIDE, CALCIUM CHLORIDE 600; 310; 30; 20 MG/100ML; MG/100ML; MG/100ML; MG/100ML
20 INJECTION, SOLUTION INTRAVENOUS CONTINUOUS
Status: CANCELLED | OUTPATIENT
Start: 2025-07-23

## 2025-07-23 RX ORDER — VANCOMYCIN HYDROCHLORIDE 1 G/200ML
1000 INJECTION, SOLUTION INTRAVENOUS ONCE
Status: CANCELLED | OUTPATIENT
Start: 2025-07-23 | End: 2025-07-23

## 2025-07-24 ENCOUNTER — ANESTHESIA (OUTPATIENT)
Dept: NON INVASIVE DIAGNOSTICS | Facility: HOSPITAL | Age: 55
End: 2025-07-24
Payer: COMMERCIAL

## 2025-07-24 ENCOUNTER — HOSPITAL ENCOUNTER (OUTPATIENT)
Facility: HOSPITAL | Age: 55
Discharge: HOME | End: 2025-07-25
Attending: INTERNAL MEDICINE | Admitting: INTERNAL MEDICINE
Payer: COMMERCIAL

## 2025-07-24 DIAGNOSIS — I47.10 PAROXYSMAL SUPRAVENTRICULAR TACHYCARDIA (HCC): ICD-10-CM

## 2025-07-24 DIAGNOSIS — I49.9 ARRHYTHMIA: ICD-10-CM

## 2025-07-24 LAB
ANION GAP SERPL CALCULATED.3IONS-SCNC: 10 MMOL/L (ref 4–13)
ATRIAL RATE: 53 BPM
BUN SERPL-MCNC: 19 MG/DL (ref 5–25)
CALCIUM SERPL-MCNC: 9.4 MG/DL (ref 8.4–10.2)
CHLORIDE SERPL-SCNC: 105 MMOL/L (ref 96–108)
CO2 SERPL-SCNC: 26 MMOL/L (ref 21–32)
CREAT SERPL-MCNC: 0.75 MG/DL (ref 0.6–1.3)
ERYTHROCYTE [DISTWIDTH] IN BLOOD BY AUTOMATED COUNT: 13.2 % (ref 11.6–15.1)
GFR SERPL CREATININE-BSD FRML MDRD: 103 ML/MIN/1.73SQ M
GLUCOSE P FAST SERPL-MCNC: 94 MG/DL (ref 65–99)
GLUCOSE SERPL-MCNC: 94 MG/DL (ref 65–140)
HCT VFR BLD AUTO: 43.8 % (ref 36.5–49.3)
HGB BLD-MCNC: 14.3 G/DL (ref 12–17)
INR PPP: 0.99 (ref 0.85–1.19)
MCH RBC QN AUTO: 31 PG (ref 26.8–34.3)
MCHC RBC AUTO-ENTMCNC: 32.6 G/DL (ref 31.4–37.4)
MCV RBC AUTO: 95 FL (ref 82–98)
P AXIS: 39 DEGREES
PLATELET # BLD AUTO: 214 THOUSANDS/UL (ref 149–390)
PMV BLD AUTO: 10.5 FL (ref 8.9–12.7)
POTASSIUM SERPL-SCNC: 4.1 MMOL/L (ref 3.5–5.3)
PR INTERVAL: 186 MS
PROTHROMBIN TIME: 13.4 SECONDS (ref 12.3–15)
QRS AXIS: 23 DEGREES
QRSD INTERVAL: 84 MS
QT INTERVAL: 414 MS
QTC INTERVAL: 389 MS
RBC # BLD AUTO: 4.62 MILLION/UL (ref 3.88–5.62)
SODIUM SERPL-SCNC: 141 MMOL/L (ref 135–147)
T WAVE AXIS: 32 DEGREES
VENTRICULAR RATE: 53 BPM
WBC # BLD AUTO: 6.15 THOUSAND/UL (ref 4.31–10.16)

## 2025-07-24 PROCEDURE — 93623 PRGRMD STIMJ&PACG IV RX NFS: CPT | Performed by: INTERNAL MEDICINE

## 2025-07-24 PROCEDURE — C1730 CATH, EP, 19 OR FEW ELECT: HCPCS | Performed by: INTERNAL MEDICINE

## 2025-07-24 PROCEDURE — 93005 ELECTROCARDIOGRAM TRACING: CPT

## 2025-07-24 PROCEDURE — 80048 BASIC METABOLIC PNL TOTAL CA: CPT | Performed by: PHYSICIAN ASSISTANT

## 2025-07-24 PROCEDURE — 93653 COMPRE EP EVAL TX SVT: CPT | Performed by: INTERNAL MEDICINE

## 2025-07-24 PROCEDURE — 85610 PROTHROMBIN TIME: CPT | Performed by: PHYSICIAN ASSISTANT

## 2025-07-24 PROCEDURE — 85027 COMPLETE CBC AUTOMATED: CPT | Performed by: PHYSICIAN ASSISTANT

## 2025-07-24 PROCEDURE — C1769 GUIDE WIRE: HCPCS | Performed by: INTERNAL MEDICINE

## 2025-07-24 PROCEDURE — 76937 US GUIDE VASCULAR ACCESS: CPT | Performed by: INTERNAL MEDICINE

## 2025-07-24 PROCEDURE — C1894 INTRO/SHEATH, NON-LASER: HCPCS | Performed by: INTERNAL MEDICINE

## 2025-07-24 PROCEDURE — 93010 ELECTROCARDIOGRAM REPORT: CPT | Performed by: INTERNAL MEDICINE

## 2025-07-24 PROCEDURE — C1766 INTRO/SHEATH,STRBLE,NON-PEEL: HCPCS | Performed by: INTERNAL MEDICINE

## 2025-07-24 PROCEDURE — C2630 CATH, EP, COOL-TIP: HCPCS | Performed by: INTERNAL MEDICINE

## 2025-07-24 RX ORDER — METOPROLOL TARTRATE 50 MG
25 TABLET ORAL EVERY MORNING
Status: DISCONTINUED | OUTPATIENT
Start: 2025-07-25 | End: 2025-07-25 | Stop reason: HOSPADM

## 2025-07-24 RX ORDER — DOCUSATE SODIUM 100 MG/1
100 CAPSULE, LIQUID FILLED ORAL 2 TIMES DAILY PRN
Status: DISCONTINUED | OUTPATIENT
Start: 2025-07-24 | End: 2025-07-25 | Stop reason: HOSPADM

## 2025-07-24 RX ORDER — LIDOCAINE HYDROCHLORIDE 10 MG/ML
INJECTION, SOLUTION EPIDURAL; INFILTRATION; INTRACAUDAL; PERINEURAL CODE/TRAUMA/SEDATION MEDICATION
Status: DISCONTINUED | OUTPATIENT
Start: 2025-07-24 | End: 2025-07-24 | Stop reason: HOSPADM

## 2025-07-24 RX ORDER — SODIUM CHLORIDE 9 MG/ML
20 INJECTION, SOLUTION INTRAVENOUS ONCE
Status: DISCONTINUED | OUTPATIENT
Start: 2025-07-24 | End: 2025-07-24 | Stop reason: HOSPADM

## 2025-07-24 RX ORDER — PRAVASTATIN SODIUM 80 MG/1
80 TABLET ORAL
Status: DISCONTINUED | OUTPATIENT
Start: 2025-07-24 | End: 2025-07-25 | Stop reason: HOSPADM

## 2025-07-24 RX ORDER — COLCHICINE 0.6 MG/1
0.6 TABLET ORAL DAILY PRN
Status: DISCONTINUED | OUTPATIENT
Start: 2025-07-24 | End: 2025-07-25 | Stop reason: HOSPADM

## 2025-07-24 RX ORDER — HEPARIN SODIUM 1000 [USP'U]/ML
INJECTION, SOLUTION INTRAVENOUS; SUBCUTANEOUS CODE/TRAUMA/SEDATION MEDICATION
Status: DISCONTINUED | OUTPATIENT
Start: 2025-07-24 | End: 2025-07-24 | Stop reason: HOSPADM

## 2025-07-24 RX ORDER — SODIUM CHLORIDE 9 MG/ML
20 INJECTION, SOLUTION INTRAVENOUS CONTINUOUS
Status: DISCONTINUED | OUTPATIENT
Start: 2025-07-24 | End: 2025-07-25 | Stop reason: HOSPADM

## 2025-07-24 RX ORDER — VANCOMYCIN HYDROCHLORIDE 1 G/200ML
INJECTION, SOLUTION INTRAVENOUS AS NEEDED
Status: DISCONTINUED | OUTPATIENT
Start: 2025-07-24 | End: 2025-07-24

## 2025-07-24 RX ORDER — MIDAZOLAM HYDROCHLORIDE 2 MG/2ML
INJECTION, SOLUTION INTRAMUSCULAR; INTRAVENOUS AS NEEDED
Status: DISCONTINUED | OUTPATIENT
Start: 2025-07-24 | End: 2025-07-24

## 2025-07-24 RX ORDER — CELECOXIB 200 MG/1
200 CAPSULE ORAL 2 TIMES DAILY
Status: DISCONTINUED | OUTPATIENT
Start: 2025-07-24 | End: 2025-07-25 | Stop reason: HOSPADM

## 2025-07-24 RX ORDER — PHENYLEPHRINE HCL IN 0.9% NACL 1 MG/10 ML
SYRINGE (ML) INTRAVENOUS AS NEEDED
Status: DISCONTINUED | OUTPATIENT
Start: 2025-07-24 | End: 2025-07-24

## 2025-07-24 RX ORDER — ACETAMINOPHEN 325 MG/1
975 TABLET ORAL EVERY 6 HOURS PRN
Status: DISCONTINUED | OUTPATIENT
Start: 2025-07-24 | End: 2025-07-25 | Stop reason: HOSPADM

## 2025-07-24 RX ORDER — PROPOFOL 10 MG/ML
INJECTION, EMULSION INTRAVENOUS AS NEEDED
Status: DISCONTINUED | OUTPATIENT
Start: 2025-07-24 | End: 2025-07-24

## 2025-07-24 RX ORDER — DILTIAZEM HCL 60 MG
30 TABLET ORAL EVERY 8 HOURS SCHEDULED
Status: DISCONTINUED | OUTPATIENT
Start: 2025-07-24 | End: 2025-07-25 | Stop reason: HOSPADM

## 2025-07-24 RX ADMIN — PROPOFOL 30 MG: 10 INJECTION, EMULSION INTRAVENOUS at 12:50

## 2025-07-24 RX ADMIN — Medication 100 MCG: at 15:23

## 2025-07-24 RX ADMIN — SODIUM CHLORIDE 20 ML/HR: 0.9 INJECTION, SOLUTION INTRAVENOUS at 10:53

## 2025-07-24 RX ADMIN — REMIFENTANIL HYDROCHLORIDE 0.05 MCG/KG/MIN: 1 INJECTION, POWDER, LYOPHILIZED, FOR SOLUTION INTRAVENOUS at 12:48

## 2025-07-24 RX ADMIN — Medication 100 MCG: at 16:20

## 2025-07-24 RX ADMIN — CELECOXIB 200 MG: 200 CAPSULE ORAL at 21:29

## 2025-07-24 RX ADMIN — VANCOMYCIN HYDROCHLORIDE 1000 MG: 1 INJECTION, SOLUTION INTRAVENOUS at 12:56

## 2025-07-24 RX ADMIN — SODIUM CHLORIDE: 0.9 INJECTION, SOLUTION INTRAVENOUS at 16:03

## 2025-07-24 RX ADMIN — PROPOFOL 30 MG: 10 INJECTION, EMULSION INTRAVENOUS at 12:47

## 2025-07-24 RX ADMIN — MIDAZOLAM 1 MG: 1 INJECTION INTRAMUSCULAR; INTRAVENOUS at 17:11

## 2025-07-24 RX ADMIN — PROPOFOL 50 MCG/KG/MIN: 10 INJECTION, EMULSION INTRAVENOUS at 12:48

## 2025-07-24 RX ADMIN — Medication 100 MCG: at 15:41

## 2025-07-24 RX ADMIN — MIDAZOLAM 1 MG: 1 INJECTION INTRAMUSCULAR; INTRAVENOUS at 16:47

## 2025-07-24 RX ADMIN — MIDAZOLAM 2 MG: 1 INJECTION INTRAMUSCULAR; INTRAVENOUS at 12:45

## 2025-07-24 NOTE — ANESTHESIA POSTPROCEDURE EVALUATION
Post-Op Assessment Note    CV Status:  Stable  Pain Score: 0    Pain management: adequate       Mental Status:  Alert and awake   Hydration Status:  Stable   PONV Controlled:  None   Airway Patency:  Patent     Post Op Vitals Reviewed: Yes    No anethesia notable event occurred.    Staff: CRNA           Last Filed PACU Vitals:  Vitals Value Taken Time   Temp 97.6 °F (36.4 °C) 07/24/25 17:45   Pulse 68 07/24/25 17:45   /87 07/24/25 17:45   Resp 16 07/24/25 17:39   SpO2 97 % 07/24/25 17:45
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
## 2025-07-24 NOTE — DISCHARGE INSTR - AVS FIRST PAGE
MEDICATION INSTRUCTIONS/CHANGES:  Please stop taking diltiazem altogether.  Please only utilize metoprolol 25mg tablet as needed for palpitations lasting longer than 20 minutes.     RESTRICTIONS:   No heavy lifting (more than 5-10 pounds) or strenuous activity for one week.    No soaking in a bath tub/hot tub/swimming pool for one week or until groin heals. You may shower - please let soap and water run over the groins, no scrubbing, and pat the area dry. Please place band-aid on groins daily for up to five days, but you may remove sooner if no issues are noted.     If you notice ongoing bleeding, swelling, or firm lumps in groin near ablation incision, please contact Dr. Allison's office - [phone]. If you have any significant issues after hours or on the weekends, please call the on call cardiology number at [phone].

## 2025-07-24 NOTE — PROGRESS NOTES
Pt. To return back to his Hospital-assigned room;   Fully AAO x3; Course uneventful; VSS; Airway patent; No c/o cp, sob, dyspnea, or PONV.

## 2025-07-24 NOTE — INTERVAL H&P NOTE
H&P reviewed. After examining the patient I find no changes in the patients condition since the H&P had been written.    Vitals:    07/24/25 1140   BP: 141/89   Pulse: 57   Resp: 16   Temp: 97.7 °F (36.5 °C)   SpO2: 96%     Please refer to full electrophysiology consultation by Dr. Allison in April of this year.  Briefly, patient is a pleasant 55-year-old male with SVT, hyperlipidemia, and hypertension.  He has had palpitations since his 20s that lasted less than an hour usually.  However, more recently since he was first time getting COVID, his arrhythmias have been rampant.  He is maintained on metoprolol and diltiazem.  When evaluated, was felt that he should undergo EP study with SVT ablation and presents today to undergo this procedure.  He reports that his episodes have really not been persistent over the last month.  
Supervision was available

## 2025-07-24 NOTE — LETTER
[facility]  [address]  [place] [address]  Dept: [phone]    July 25, 2025     Patient: Gil Mcdonald   YOB: 1970   Date of Visit: 7/24/2025       To Whom it May Concern:    Gil Mcdonald is under my professional care. He was seen in the hospital from 7/24/2025 to 07/25/25. He may return to work on 7/31/2025 without limitations.    If you have any questions or concerns, please don't hesitate to call [phone] option clinical.         Sincerely,          Stacey Yang PA-C

## 2025-07-25 VITALS
HEIGHT: 70 IN | HEART RATE: 69 BPM | RESPIRATION RATE: 20 BRPM | BODY MASS INDEX: 27.92 KG/M2 | TEMPERATURE: 97.8 F | DIASTOLIC BLOOD PRESSURE: 88 MMHG | OXYGEN SATURATION: 99 % | SYSTOLIC BLOOD PRESSURE: 138 MMHG | WEIGHT: 195 LBS

## 2025-07-25 PROBLEM — Z86.79 STATUS POST CATHETER ABLATION OF SLOW PATHWAY: Status: ACTIVE | Noted: 2025-07-25

## 2025-07-25 PROBLEM — Z98.890 STATUS POST CATHETER ABLATION OF SLOW PATHWAY: Status: ACTIVE | Noted: 2025-07-25

## 2025-07-25 LAB
ANION GAP SERPL CALCULATED.3IONS-SCNC: 4 MMOL/L (ref 4–13)
ATRIAL RATE: 58 BPM
BASOPHILS # BLD AUTO: 0.03 THOUSANDS/ÂΜL (ref 0–0.1)
BASOPHILS NFR BLD AUTO: 1 % (ref 0–1)
BUN SERPL-MCNC: 11 MG/DL (ref 5–25)
CALCIUM SERPL-MCNC: 8.1 MG/DL (ref 8.4–10.2)
CHLORIDE SERPL-SCNC: 110 MMOL/L (ref 96–108)
CO2 SERPL-SCNC: 26 MMOL/L (ref 21–32)
CREAT SERPL-MCNC: 0.61 MG/DL (ref 0.6–1.3)
EOSINOPHIL # BLD AUTO: 0.15 THOUSAND/ÂΜL (ref 0–0.61)
EOSINOPHIL NFR BLD AUTO: 2 % (ref 0–6)
ERYTHROCYTE [DISTWIDTH] IN BLOOD BY AUTOMATED COUNT: 13.2 % (ref 11.6–15.1)
GFR SERPL CREATININE-BSD FRML MDRD: 112 ML/MIN/1.73SQ M
GLUCOSE P FAST SERPL-MCNC: 90 MG/DL (ref 65–99)
GLUCOSE SERPL-MCNC: 90 MG/DL (ref 65–140)
HCT VFR BLD AUTO: 38.3 % (ref 36.5–49.3)
HGB BLD-MCNC: 12.5 G/DL (ref 12–17)
IMM GRANULOCYTES # BLD AUTO: 0.02 THOUSAND/UL (ref 0–0.2)
IMM GRANULOCYTES NFR BLD AUTO: 0 % (ref 0–2)
LYMPHOCYTES # BLD AUTO: 1.6 THOUSANDS/ÂΜL (ref 0.6–4.47)
LYMPHOCYTES NFR BLD AUTO: 26 % (ref 14–44)
MCH RBC QN AUTO: 31.1 PG (ref 26.8–34.3)
MCHC RBC AUTO-ENTMCNC: 32.6 G/DL (ref 31.4–37.4)
MCV RBC AUTO: 95 FL (ref 82–98)
MONOCYTES # BLD AUTO: 0.61 THOUSAND/ÂΜL (ref 0.17–1.22)
MONOCYTES NFR BLD AUTO: 10 % (ref 4–12)
NEUTROPHILS # BLD AUTO: 3.85 THOUSANDS/ÂΜL (ref 1.85–7.62)
NEUTS SEG NFR BLD AUTO: 61 % (ref 43–75)
NRBC BLD AUTO-RTO: 0 /100 WBCS
P AXIS: 53 DEGREES
PLATELET # BLD AUTO: 172 THOUSANDS/UL (ref 149–390)
PMV BLD AUTO: 10.8 FL (ref 8.9–12.7)
POTASSIUM SERPL-SCNC: 4 MMOL/L (ref 3.5–5.3)
PR INTERVAL: 192 MS
QRS AXIS: 29 DEGREES
QRS AXIS: 35 DEGREES
QRSD INTERVAL: 86 MS
QRSD INTERVAL: 92 MS
QT INTERVAL: 382 MS
QT INTERVAL: 392 MS
QTC INTERVAL: 385 MS
QTC INTERVAL: 401 MS
RBC # BLD AUTO: 4.02 MILLION/UL (ref 3.88–5.62)
SODIUM SERPL-SCNC: 140 MMOL/L (ref 135–147)
T WAVE AXIS: 27 DEGREES
T WAVE AXIS: 33 DEGREES
VENTRICULAR RATE: 58 BPM
VENTRICULAR RATE: 66 BPM
WBC # BLD AUTO: 6.26 THOUSAND/UL (ref 4.31–10.16)

## 2025-07-25 PROCEDURE — NC001 PR NO CHARGE: Performed by: PHYSICIAN ASSISTANT

## 2025-07-25 PROCEDURE — 85025 COMPLETE CBC W/AUTO DIFF WBC: CPT | Performed by: PHYSICIAN ASSISTANT

## 2025-07-25 PROCEDURE — 93010 ELECTROCARDIOGRAM REPORT: CPT | Performed by: INTERNAL MEDICINE

## 2025-07-25 PROCEDURE — 80048 BASIC METABOLIC PNL TOTAL CA: CPT | Performed by: PHYSICIAN ASSISTANT

## 2025-07-25 RX ORDER — METOPROLOL TARTRATE 25 MG/1
25 TABLET, FILM COATED ORAL DAILY PRN
Start: 2025-07-25

## 2025-07-25 NOTE — NURSING NOTE
This RN reviewed AVS with patient; patient understands. Patient walked out for discharge, no issues.     Catherine BONILLA RN

## 2025-07-25 NOTE — ANESTHESIA POSTPROCEDURE EVALUATION
Post-Op Assessment Note    Last Filed PACU Vitals:  Vitals Value Taken Time   Temp 97.8 °F (36.6 °C) 07/25/25 09:30   Pulse 93 07/25/25 10:13   /88 07/25/25 08:02   Resp 20 07/25/25 08:00   SpO2 100 % 07/25/25 10:13   Vitals shown include unfiled device data.

## 2025-07-25 NOTE — DISCHARGE SUMMARY
Discharge Summary - Electrophysiology   Name: Gil Mcdonald 55 y.o. male I MRN: [id number]  Unit/Bed#: [facility] 422-01 I Date of Admission: 7/24/2025   Date of Service: 7/25/2025 I Hospital Day: 0      Discharge Summary - Gil Mcdonald 55 y.o. 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 Briefly, Gil Mcdonald is a 55 y.o. year old male with past medical history as stated above.   He was seen by Dr. Allison as an outpatient, and EP study and SVT baltion was recommended. He presented this hospital admission to undergo this procedure.     Hospital Course: Gil Mcdonald presented at his baseline state of health. After the procedure was explained in detail and consent was obtained, he underwent ablation of AVNRT with slow pathway modification without complications. He tolerated the procedure well. He was then monitored overnight for further observation.    There were no acute issues or events overnight. The following morning He denied all cardiac complaints, including chest pain/pressure, dyspnea, palpitations, dizziness, lightheadedness, or syncope. His vital signs were reviewed and labs are stable. Telemetry showed sinus rhythm with no further atrial arrhythmias or ectopy. His groins were soft without significant hematoma or recurrent bleeding. Physical exam on the day of discharge was as follows:  GEN: NAD, alert and oriented,  well appearing  SKIN: dry without significant lesions or rashes  HEENT: NCAT, PERRL, EOMs intact  NECK: No JVD or carotid bruits appreciated  CARDIOVASCULAR: RRR, normal S1, S2 without murmurs, rubs, or gallops appreciated  LUNGS: Clear to auscultation bilaterally without wheezes, rhonchi, or rales  ABDOMEN: Soft, nontender, nondistended  EXTREMITIES/VASCULAR: perfused without clubbing, cyanosis, or edema b/l  PSYCH: Normal mood and affect  NEURO: CN ll-Xll grossly intact    He was given routine post ablation discharge instructions and restrictions, and these were explained in detail. He was given a follow up appointment with Daniel Montenegro PA-C, and he was instructed to follow up with his primary cardiologist as previously instructed.    In terms of his medications, he will stop his diltiazem (was not taking constantly) and will only use metoprolol 25mg daily as needed for palpitations lasting longer than 20 minutes.    He is stable for discharge at this time with all questions answered. He was discussed in detail with Dr. Allison who is in agreement with this discharge summary.     Discharge Medications:  See after visit summary for reconciled discharge medications provided to patient and family.      Medications Prior to Admission:     celecoxib (CeleBREX) 200 mg capsule    metoprolol tartrate (LOPRESSOR) 25 mg tablet    Multiple Vitamin (multivitamin) tablet    rosuvastatin (CRESTOR) 10 MG tablet    colchicine (COLCRYS) 0.6 mg tablet    diltiazem (CARDIZEM) 30 mg tablet      Pertininet Labs/diagnostics:  CBC with diff:   Results from last 7 days   Lab Units 07/25/25  0542 07/24/25  1054   WBC Thousand/uL 6.26 6.15   HEMOGLOBIN g/dL 12.5 14.3   HEMATOCRIT % 38.3 43.8   MCV fL 95 95   PLATELETS Thousands/uL 172 214   RBC Million/uL 4.02 4.62   MCH pg 31.1 31.0   MCHC g/dL 32.6 32.6   RDW % 13.2 13.2   MPV fL 10.8 10.5   NRBC AUTO /100 WBCs 0  --        BMP:  Results from last 7 days   Lab Units 07/25/25  0542  07/24/25  1054   POTASSIUM mmol/L 4.0 4.1   CHLORIDE mmol/L 110* 105   CO2 mmol/L 26 26   BUN mg/dL 11 19   CREATININE mg/dL 0.61 0.75   CALCIUM mg/dL 8.1* 9.4       Magnesium:       Coags:   Results from last 7 days   Lab Units 07/24/25  1054   INR  0.99         Complications: none    Condition at Discharge: good     Discharge instructions/Information to patient and family:   See after visit summary for information provided to patient and family.      Provisions for Follow-Up Care:  See after visit summary for information related to follow-up care and any pertinent home health orders.      Disposition: Home    Planned Readmission: No    Discharge Statement   I spent 45 minutes minutes discharging the patient. This time was spent on the day of discharge. I had direct contact with the patient on the day of discharge. Additional documentation is required if more than 30 minutes were spent on discharge. Evaluating the incision, discussing discharge instructions and restrictions, arranging follow up appointments, discussing medications

## (undated) DEVICE — GLOVE SRG BIOGEL 7

## (undated) DEVICE — PADDING CAST 6IN COTTON STRL

## (undated) DEVICE — 3M™ STERI-DRAPE™ U-DRAPE 1015: Brand: STERI-DRAPE™

## (undated) DEVICE — Device

## (undated) DEVICE — GLOVE PI ULTRA TOUCH SZ.7.5

## (undated) DEVICE — STIRRUP STRAP ADULT DISP

## (undated) DEVICE — PREP SURGICAL PURPREP 26ML

## (undated) DEVICE — SUT VICRYL 1 CT-1 27 IN J261H

## (undated) DEVICE — TUBING ARTHROSCOPIC WAVE  MAIN PUMP

## (undated) DEVICE — GLOVE SRG BIOGEL 6.5

## (undated) DEVICE — CAPIT KNEE OXF UNI FM/CM TB/UNI/BLD

## (undated) DEVICE — HOOD: Brand: T7PLUS

## (undated) DEVICE — GLOVE INDICATOR PI UNDERGLOVE SZ 6.5 BLUE

## (undated) DEVICE — STOCKINETTE,IMPERVIOUS,12X48,STERILE: Brand: MEDLINE

## (undated) DEVICE — BETHLEHEM UNIV TOTAL KNEE, KIT: Brand: CARDINAL HEALTH

## (undated) DEVICE — SYRINGE CATH TIP 50ML

## (undated) DEVICE — DRAPE LAPAROTOMY W/POUCHES

## (undated) DEVICE — SUT MONOCRYL 4-0 PS-2 27 IN Y426H

## (undated) DEVICE — GLOVE SRG LF STRL BGL SKNSNS 6.5 PF

## (undated) DEVICE — BINDER ABDOMINAL 30-45 IN

## (undated) DEVICE — EXOFIN PRECISION PEN HIGH VISCOSITY TOPICAL SKIN ADHESIVE: Brand: EXOFIN PRECISION PEN, 1G

## (undated) DEVICE — GAUZE SPONGES,16 PLY: Brand: CURITY

## (undated) DEVICE — INTENDED FOR TISSUE SEPARATION, AND OTHER PROCEDURES THAT REQUIRE A SHARP SURGICAL BLADE TO PUNCTURE OR CUT.: Brand: BARD-PARKER ® SAFETYLOCK CARBON RIB-BACK BLADES

## (undated) DEVICE — DISPOSABLE OR TOWEL: Brand: CARDINAL HEALTH

## (undated) DEVICE — 3 BONE CEMENT MIXER: Brand: MIXEVAC

## (undated) DEVICE — NEEDLE 25G X 1 1/2

## (undated) DEVICE — GLOVE SRG LF STRL BGL SKNSNS 8.5 PF

## (undated) DEVICE — ABDOMINAL PAD: Brand: DERMACEA

## (undated) DEVICE — SURGICAL GOWN, XL SMARTSLEEVE: Brand: CONVERTORS

## (undated) DEVICE — DRAPE SHEET THREE QUARTER

## (undated) DEVICE — GLOVE SRG BIOGEL 8.5

## (undated) DEVICE — PLUMEPEN PRO 10FT

## (undated) DEVICE — SURGICAL CLIPPER BLADE GENERAL USE

## (undated) DEVICE — SUT PROLENE 2-0 MO-6 30 IN 8417H

## (undated) DEVICE — ACE WRAP 6 IN STERILE

## (undated) DEVICE — INTENDED FOR TISSUE SEPARATION, AND OTHER PROCEDURES THAT REQUIRE A SHARP SURGICAL BLADE TO PUNCTURE OR CUT.: Brand: BARD-PARKER SAFETY BLADES SIZE 15, STERILE

## (undated) DEVICE — CHLORAPREP HI-LITE 26ML ORANGE

## (undated) DEVICE — BLADE SAW OXFORD

## (undated) DEVICE — ACE WRAP 6 IN UNSTERILE

## (undated) DEVICE — 450 ML BOTTLE OF 0.05% CHLORHEXIDINE GLUCONATE IN 99.95% STERILE WATER FOR IRRIGATION, USP AND APPLICATOR.: Brand: IRRISEPT ANTIMICROBIAL WOUND LAVAGE

## (undated) DEVICE — 4-PORT MANIFOLD: Brand: NEPTUNE 2

## (undated) DEVICE — GLOVE SRG BIOGEL 7.5

## (undated) DEVICE — LIGHT GLOVE GREEN

## (undated) DEVICE — BLADE SHAVER DISSECTOR  4MM 13CM CRV COOLCUT

## (undated) DEVICE — HOOD WITH PEEL AWAY FACE SHIELD: Brand: T7PLUS

## (undated) DEVICE — SUT STRATAFIX SPIRAL MONOCRYL PLUS 4-0 PS-2 45CM SXMP1B118

## (undated) DEVICE — BETHLEHEM UNIVERSAL  ARTHRO PK: Brand: CARDINAL HEALTH

## (undated) DEVICE — STERILE POLYISOPRENE POWDER-FREE SURGICAL GLOVES WITH EMOLLIENT COATING: Brand: PROTEXIS

## (undated) DEVICE — 3M™ STERI-STRIP™ REINFORCED ADHESIVE SKIN CLOSURES, R1547, 1/2 IN X 4 IN (12 MM X 100 MM), 6 STRIPS/ENVELOPE: Brand: 3M™ STERI-STRIP™

## (undated) DEVICE — NEEDLE 18 G X 1 1/2

## (undated) DEVICE — MEDI-VAC YANKAUER SUCTION HANDLE W/BULBOUS AND CONTROL VENT: Brand: CARDINAL HEALTH

## (undated) DEVICE — CUFF TOURNIQUET 30 X 4 IN QUICK CONNECT DISP 1BLA

## (undated) DEVICE — DRAPE EQUIPMENT RF WAND

## (undated) DEVICE — WEBRIL 6 IN UNSTERILE

## (undated) DEVICE — SCD SEQUENTIAL COMPRESSION COMFORT SLEEVE MEDIUM KNEE LENGTH: Brand: KENDALL SCD

## (undated) DEVICE — HANDPIECE SET WITH RETRACTABLE COAXIAL FAN SPRAY TIP AND SUCTION TUBE: Brand: INTERPULSE

## (undated) DEVICE — ARTHROSCOPY FLOOR MAT

## (undated) DEVICE — OCCLUSIVE GAUZE STRIP,3% BISMUTH TRIBROMOPHENATE IN PETROLATUM BLEND: Brand: XEROFORM

## (undated) DEVICE — SUT VICRYL 3-0 SH 27 IN J416H

## (undated) DEVICE — PAD CAST 4 IN COTTON NON STERILE

## (undated) DEVICE — IMPERVIOUS STOCKINETTE: Brand: DEROYAL

## (undated) DEVICE — SUT VICRYL 0 CT-1 36 IN J946H

## (undated) DEVICE — TUBING SUCTION 5MM X 12 FT

## (undated) DEVICE — GLOVE SRG BIOGEL ORTHOPEDIC 8.5

## (undated) DEVICE — GLOVE INDICATOR PI UNDERGLOVE SZ 8.5 BLUE

## (undated) DEVICE — NEEDLE BLUNT 18 G X 1 1/2IN

## (undated) DEVICE — GLOVE PI ULTRA TOUCH SZ.6.5

## (undated) DEVICE — GLOVE INDICATOR PI UNDERGLOVE SZ 8 BLUE

## (undated) DEVICE — DRESSING MEPILEX AG BORDER POST-OP 4 X 12 IN

## (undated) DEVICE — GLOVE INDICATOR PI UNDERGLOVE SZ 7 BLUE

## (undated) DEVICE — SYRINGE 30ML LL

## (undated) DEVICE — SUT STRATAFIX SPIRAL PDS PLUS 1 CTX 18 IN SXPP1A400

## (undated) DEVICE — ADHESIVE SKIN HIGH VISCOSITY EXOFIN 1ML

## (undated) DEVICE — SPONGE SCRUB 4 PCT CHLORHEXIDINE

## (undated) DEVICE — EXTREMITY DRAPE W/ARMBOARD COVERS: Brand: CONVERTORS

## (undated) DEVICE — SUT VICRYL 2-0 SH 27 IN UNDYED J417H

## (undated) DEVICE — SUT VICRYL 2-0 CT-1 36 IN J945H

## (undated) DEVICE — I DISPOSABLE MIXING BOWL AND SPATULA: Brand: HOWMEDICA, MIX-KIT

## (undated) DEVICE — SPECIMEN CONTAINER STERILE PEEL PACK

## (undated) DEVICE — GLOVE SRG BIOGEL ECLIPSE 7.5

## (undated) DEVICE — BETHLEHEM UNIVERSAL MINOR GEN: Brand: CARDINAL HEALTH

## (undated) DEVICE — 2000CC GUARDIAN II: Brand: GUARDIAN

## (undated) DEVICE — SYRINGE 50ML LL

## (undated) DEVICE — 3M™ IOBAN™ 2 ANTIMICROBIAL INCISE DRAPE 6650EZ: Brand: IOBAN™ 2